# Patient Record
Sex: FEMALE | Race: WHITE | NOT HISPANIC OR LATINO | Employment: FULL TIME | URBAN - METROPOLITAN AREA
[De-identification: names, ages, dates, MRNs, and addresses within clinical notes are randomized per-mention and may not be internally consistent; named-entity substitution may affect disease eponyms.]

---

## 2017-07-27 ENCOUNTER — COMMUNICATION - HEALTHEAST (OUTPATIENT)
Dept: INTERNAL MEDICINE | Facility: CLINIC | Age: 42
End: 2017-07-27

## 2017-07-27 DIAGNOSIS — I10 ESSENTIAL HYPERTENSION: ICD-10-CM

## 2017-10-19 ENCOUNTER — OFFICE VISIT - HEALTHEAST (OUTPATIENT)
Dept: INTERNAL MEDICINE | Facility: CLINIC | Age: 42
End: 2017-10-19

## 2017-10-19 DIAGNOSIS — I10 ESSENTIAL HYPERTENSION: ICD-10-CM

## 2017-10-19 ASSESSMENT — MIFFLIN-ST. JEOR: SCORE: 1211.27

## 2017-10-23 ENCOUNTER — COMMUNICATION - HEALTHEAST (OUTPATIENT)
Dept: INTERNAL MEDICINE | Facility: CLINIC | Age: 42
End: 2017-10-23

## 2018-05-21 ENCOUNTER — COMMUNICATION - HEALTHEAST (OUTPATIENT)
Dept: SCHEDULING | Facility: CLINIC | Age: 43
End: 2018-05-21

## 2018-05-22 ENCOUNTER — OFFICE VISIT - HEALTHEAST (OUTPATIENT)
Dept: INTERNAL MEDICINE | Facility: CLINIC | Age: 43
End: 2018-05-22

## 2018-05-22 ENCOUNTER — COMMUNICATION - HEALTHEAST (OUTPATIENT)
Dept: TELEHEALTH | Facility: CLINIC | Age: 43
End: 2018-05-22

## 2018-05-22 DIAGNOSIS — R07.9 CHEST PAIN: ICD-10-CM

## 2018-05-22 DIAGNOSIS — R00.2 HEART PALPITATIONS: ICD-10-CM

## 2018-05-22 LAB
ATRIAL RATE - MUSE: 81 BPM
DIASTOLIC BLOOD PRESSURE - MUSE: NORMAL MMHG
INTERPRETATION ECG - MUSE: NORMAL
P AXIS - MUSE: 55 DEGREES
PR INTERVAL - MUSE: 136 MS
QRS DURATION - MUSE: 86 MS
QT - MUSE: 374 MS
QTC - MUSE: 434 MS
R AXIS - MUSE: 34 DEGREES
SYSTOLIC BLOOD PRESSURE - MUSE: NORMAL MMHG
T AXIS - MUSE: 23 DEGREES
VENTRICULAR RATE- MUSE: 81 BPM

## 2018-08-10 ENCOUNTER — COMMUNICATION - HEALTHEAST (OUTPATIENT)
Dept: INTERNAL MEDICINE | Facility: CLINIC | Age: 43
End: 2018-08-10

## 2018-08-10 DIAGNOSIS — I10 ESSENTIAL HYPERTENSION: ICD-10-CM

## 2018-11-01 ENCOUNTER — COMMUNICATION - HEALTHEAST (OUTPATIENT)
Dept: INTERNAL MEDICINE | Facility: CLINIC | Age: 43
End: 2018-11-01

## 2018-11-01 ENCOUNTER — COMMUNICATION - HEALTHEAST (OUTPATIENT)
Dept: TELEHEALTH | Facility: CLINIC | Age: 43
End: 2018-11-01

## 2018-11-01 ENCOUNTER — OFFICE VISIT - HEALTHEAST (OUTPATIENT)
Dept: INTERNAL MEDICINE | Facility: CLINIC | Age: 43
End: 2018-11-01

## 2018-11-01 DIAGNOSIS — I10 ESSENTIAL HYPERTENSION: ICD-10-CM

## 2018-11-01 LAB
ANION GAP SERPL CALCULATED.3IONS-SCNC: 11 MMOL/L (ref 5–18)
BUN SERPL-MCNC: 16 MG/DL (ref 8–22)
CALCIUM SERPL-MCNC: 10.2 MG/DL (ref 8.5–10.5)
CHLORIDE BLD-SCNC: 102 MMOL/L (ref 98–107)
CO2 SERPL-SCNC: 28 MMOL/L (ref 22–31)
CREAT SERPL-MCNC: 0.75 MG/DL (ref 0.6–1.1)
GFR SERPL CREATININE-BSD FRML MDRD: >60 ML/MIN/1.73M2
GLUCOSE BLD-MCNC: 99 MG/DL (ref 70–125)
POTASSIUM BLD-SCNC: 3.8 MMOL/L (ref 3.5–5)
SODIUM SERPL-SCNC: 141 MMOL/L (ref 136–145)

## 2018-11-01 ASSESSMENT — MIFFLIN-ST. JEOR: SCORE: 1208.01

## 2018-11-06 ENCOUNTER — COMMUNICATION - HEALTHEAST (OUTPATIENT)
Dept: INTERNAL MEDICINE | Facility: CLINIC | Age: 43
End: 2018-11-06

## 2018-11-06 DIAGNOSIS — I10 ESSENTIAL HYPERTENSION: ICD-10-CM

## 2020-01-17 ENCOUNTER — COMMUNICATION - HEALTHEAST (OUTPATIENT)
Dept: INTERNAL MEDICINE | Facility: CLINIC | Age: 45
End: 2020-01-17

## 2020-01-17 DIAGNOSIS — I10 ESSENTIAL HYPERTENSION: ICD-10-CM

## 2020-01-23 ENCOUNTER — OFFICE VISIT - HEALTHEAST (OUTPATIENT)
Dept: INTERNAL MEDICINE | Facility: CLINIC | Age: 45
End: 2020-01-23

## 2020-01-23 DIAGNOSIS — R79.89 LOW TSH LEVEL: ICD-10-CM

## 2020-01-23 DIAGNOSIS — D17.30 LIPOMA OF SKIN AND SUBCUTANEOUS TISSUE: ICD-10-CM

## 2020-01-23 DIAGNOSIS — I10 ESSENTIAL HYPERTENSION: ICD-10-CM

## 2020-01-23 DIAGNOSIS — Z76.89 ENCOUNTER TO ESTABLISH CARE: ICD-10-CM

## 2020-01-23 LAB
ANION GAP SERPL CALCULATED.3IONS-SCNC: 9 MMOL/L (ref 5–18)
BASOPHILS # BLD AUTO: 0 THOU/UL (ref 0–0.2)
BASOPHILS NFR BLD AUTO: 0 % (ref 0–2)
BUN SERPL-MCNC: 14 MG/DL (ref 8–22)
CALCIUM SERPL-MCNC: 10 MG/DL (ref 8.5–10.5)
CHLORIDE BLD-SCNC: 101 MMOL/L (ref 98–107)
CHOLEST SERPL-MCNC: 189 MG/DL
CO2 SERPL-SCNC: 28 MMOL/L (ref 22–31)
CREAT SERPL-MCNC: 0.69 MG/DL (ref 0.6–1.1)
EOSINOPHIL # BLD AUTO: 0.1 THOU/UL (ref 0–0.4)
EOSINOPHIL NFR BLD AUTO: 2 % (ref 0–6)
ERYTHROCYTE [DISTWIDTH] IN BLOOD BY AUTOMATED COUNT: 13.1 % (ref 11–14.5)
FASTING STATUS PATIENT QL REPORTED: ABNORMAL
GFR SERPL CREATININE-BSD FRML MDRD: >60 ML/MIN/1.73M2
GLUCOSE BLD-MCNC: 84 MG/DL (ref 70–125)
HCT VFR BLD AUTO: 42.3 % (ref 35–47)
HDLC SERPL-MCNC: 35 MG/DL
HGB BLD-MCNC: 13.9 G/DL (ref 12–16)
LDLC SERPL CALC-MCNC: 85 MG/DL
LYMPHOCYTES # BLD AUTO: 1.8 THOU/UL (ref 0.8–4.4)
LYMPHOCYTES NFR BLD AUTO: 26 % (ref 20–40)
MCH RBC QN AUTO: 27.8 PG (ref 27–34)
MCHC RBC AUTO-ENTMCNC: 32.9 G/DL (ref 32–36)
MCV RBC AUTO: 85 FL (ref 80–100)
MONOCYTES # BLD AUTO: 0.6 THOU/UL (ref 0–0.9)
MONOCYTES NFR BLD AUTO: 9 % (ref 2–10)
NEUTROPHILS # BLD AUTO: 4.3 THOU/UL (ref 2–7.7)
NEUTROPHILS NFR BLD AUTO: 63 % (ref 50–70)
PLATELET # BLD AUTO: 284 THOU/UL (ref 140–440)
PMV BLD AUTO: 10.8 FL (ref 8.5–12.5)
POTASSIUM BLD-SCNC: 4.2 MMOL/L (ref 3.5–5)
RBC # BLD AUTO: 5 MILL/UL (ref 3.8–5.4)
SODIUM SERPL-SCNC: 138 MMOL/L (ref 136–145)
TRIGL SERPL-MCNC: 347 MG/DL
TSH SERPL DL<=0.005 MIU/L-ACNC: <0.01 UIU/ML (ref 0.3–5)
WBC: 6.8 THOU/UL (ref 4–11)

## 2020-01-23 ASSESSMENT — ANXIETY QUESTIONNAIRES
1. FEELING NERVOUS, ANXIOUS, OR ON EDGE: NOT AT ALL
3. WORRYING TOO MUCH ABOUT DIFFERENT THINGS: NOT AT ALL
6. BECOMING EASILY ANNOYED OR IRRITABLE: NOT AT ALL
4. TROUBLE RELAXING: NOT AT ALL
5. BEING SO RESTLESS THAT IT IS HARD TO SIT STILL: NOT AT ALL
7. FEELING AFRAID AS IF SOMETHING AWFUL MIGHT HAPPEN: NOT AT ALL
2. NOT BEING ABLE TO STOP OR CONTROL WORRYING: NOT AT ALL
GAD7 TOTAL SCORE: 0
IF YOU CHECKED OFF ANY PROBLEMS ON THIS QUESTIONNAIRE, HOW DIFFICULT HAVE THESE PROBLEMS MADE IT FOR YOU TO DO YOUR WORK, TAKE CARE OF THINGS AT HOME, OR GET ALONG WITH OTHER PEOPLE: NOT DIFFICULT AT ALL

## 2020-01-23 ASSESSMENT — MIFFLIN-ST. JEOR: SCORE: 1088.8

## 2020-01-23 ASSESSMENT — PATIENT HEALTH QUESTIONNAIRE - PHQ9: SUM OF ALL RESPONSES TO PHQ QUESTIONS 1-9: 1

## 2020-01-24 ENCOUNTER — COMMUNICATION - HEALTHEAST (OUTPATIENT)
Dept: INTERNAL MEDICINE | Facility: CLINIC | Age: 45
End: 2020-01-24

## 2020-01-24 ENCOUNTER — AMBULATORY - HEALTHEAST (OUTPATIENT)
Dept: INTERNAL MEDICINE | Facility: CLINIC | Age: 45
End: 2020-01-24

## 2020-01-24 DIAGNOSIS — R79.89 LOW TSH LEVEL: ICD-10-CM

## 2020-01-24 LAB
T3 SERPL-MCNC: 100 NG/DL (ref 45–175)
T4 FREE SERPL-MCNC: 1.2 NG/DL (ref 0.7–1.8)
T4 TOTAL - HISTORICAL: 9.2 UG/DL (ref 4.5–13)

## 2020-02-11 ENCOUNTER — COMMUNICATION - HEALTHEAST (OUTPATIENT)
Dept: INTERNAL MEDICINE | Facility: CLINIC | Age: 45
End: 2020-02-11

## 2020-02-11 DIAGNOSIS — I10 ESSENTIAL HYPERTENSION: ICD-10-CM

## 2020-08-31 ENCOUNTER — RECORDS - HEALTHEAST (OUTPATIENT)
Dept: ADMINISTRATIVE | Facility: OTHER | Age: 45
End: 2020-08-31

## 2020-09-03 ENCOUNTER — RECORDS - HEALTHEAST (OUTPATIENT)
Dept: ADMINISTRATIVE | Facility: OTHER | Age: 45
End: 2020-09-03

## 2020-09-09 ENCOUNTER — RECORDS - HEALTHEAST (OUTPATIENT)
Dept: ADMINISTRATIVE | Facility: OTHER | Age: 45
End: 2020-09-09

## 2020-09-16 ENCOUNTER — AMBULATORY - HEALTHEAST (OUTPATIENT)
Dept: SURGERY | Facility: CLINIC | Age: 45
End: 2020-09-16

## 2020-09-16 ENCOUNTER — HOSPITAL ENCOUNTER (OUTPATIENT)
Dept: MAMMOGRAPHY | Facility: CLINIC | Age: 45
Discharge: HOME | End: 2020-09-16
Attending: OBSTETRICS & GYNECOLOGY

## 2020-09-16 DIAGNOSIS — N63.10 LUMP OF RIGHT BREAST: ICD-10-CM

## 2020-09-22 ENCOUNTER — COMMUNICATION - HEALTHEAST (OUTPATIENT)
Dept: INTERNAL MEDICINE | Facility: CLINIC | Age: 45
End: 2020-09-22

## 2020-09-24 ENCOUNTER — COMMUNICATION - HEALTHEAST (OUTPATIENT)
Dept: INTERNAL MEDICINE | Facility: CLINIC | Age: 45
End: 2020-09-24

## 2020-09-25 ENCOUNTER — COMMUNICATION - HEALTHEAST (OUTPATIENT)
Dept: SCHEDULING | Facility: CLINIC | Age: 45
End: 2020-09-25

## 2020-09-28 ENCOUNTER — OFFICE VISIT - HEALTHEAST (OUTPATIENT)
Dept: INTERNAL MEDICINE | Facility: CLINIC | Age: 45
End: 2020-09-28

## 2020-09-28 DIAGNOSIS — I10 ESSENTIAL HYPERTENSION: ICD-10-CM

## 2020-09-29 ENCOUNTER — HOSPITAL ENCOUNTER (OUTPATIENT)
Dept: SURGERY | Facility: CLINIC | Age: 45
Discharge: HOME | End: 2020-09-29
Attending: SPECIALIST | Admitting: SPECIALIST

## 2020-09-29 DIAGNOSIS — N63.0 BREAST MASS: ICD-10-CM

## 2020-09-29 ASSESSMENT — MIFFLIN-ST. JEOR: SCORE: 1206.74

## 2020-10-02 ENCOUNTER — COMMUNICATION - HEALTHEAST (OUTPATIENT)
Dept: ONCOLOGY | Facility: CLINIC | Age: 45
End: 2020-10-02

## 2020-10-02 ENCOUNTER — AMBULATORY - HEALTHEAST (OUTPATIENT)
Dept: SURGERY | Facility: CLINIC | Age: 45
End: 2020-10-02

## 2020-10-02 DIAGNOSIS — C50.311 MALIGNANT NEOPLASM OF LOWER-INNER QUADRANT OF RIGHT FEMALE BREAST, UNSPECIFIED ESTROGEN RECEPTOR STATUS (H): ICD-10-CM

## 2020-10-02 LAB
CAP COMMENT: ABNORMAL
LAB AP CHARGES (HE HISTORICAL CONVERSION): ABNORMAL
LAB MED GENERAL PATH INTERP (HE HISTORICAL CONVERSION): ABNORMAL
PATH REPORT.COMMENTS IMP SPEC: ABNORMAL
PATH REPORT.COMMENTS IMP SPEC: ABNORMAL
PATH REPORT.FINAL DX SPEC: ABNORMAL
PATH REPORT.MICROSCOPIC SPEC OTHER STN: ABNORMAL
PATH REPORT.MICROSCOPIC SPEC OTHER STN: ABNORMAL
PATH REPORT.RELEVANT HX SPEC: ABNORMAL
SPECIMEN DESCRIPTION: ABNORMAL

## 2020-10-05 ENCOUNTER — AMBULATORY - HEALTHEAST (OUTPATIENT)
Dept: SURGERY | Facility: CLINIC | Age: 45
End: 2020-10-05

## 2020-10-05 ENCOUNTER — OFFICE VISIT - HEALTHEAST (OUTPATIENT)
Dept: ONCOLOGY | Facility: CLINIC | Age: 45
End: 2020-10-05

## 2020-10-05 DIAGNOSIS — D70.1 CHEMOTHERAPY-INDUCED NEUTROPENIA (H): ICD-10-CM

## 2020-10-05 DIAGNOSIS — T45.1X5A CHEMOTHERAPY-INDUCED NEUTROPENIA (H): ICD-10-CM

## 2020-10-05 DIAGNOSIS — C50.311 MALIGNANT NEOPLASM OF LOWER-INNER QUADRANT OF RIGHT FEMALE BREAST, UNSPECIFIED ESTROGEN RECEPTOR STATUS (H): ICD-10-CM

## 2020-10-05 DIAGNOSIS — C50.311 MALIGNANT NEOPLASM OF LOWER-INNER QUADRANT OF RIGHT BREAST OF FEMALE, ESTROGEN RECEPTOR NEGATIVE (H): ICD-10-CM

## 2020-10-05 DIAGNOSIS — Z17.1 MALIGNANT NEOPLASM OF LOWER-INNER QUADRANT OF RIGHT BREAST OF FEMALE, ESTROGEN RECEPTOR NEGATIVE (H): ICD-10-CM

## 2020-10-05 ASSESSMENT — MIFFLIN-ST. JEOR: SCORE: 1189.73

## 2020-10-06 ENCOUNTER — COMMUNICATION - HEALTHEAST (OUTPATIENT)
Dept: ONCOLOGY | Facility: HOSPITAL | Age: 45
End: 2020-10-06

## 2020-10-06 ENCOUNTER — HOSPITAL ENCOUNTER (OUTPATIENT)
Dept: ULTRASOUND IMAGING | Facility: CLINIC | Age: 45
Discharge: HOME | End: 2020-10-06
Attending: INTERNAL MEDICINE

## 2020-10-06 ENCOUNTER — AMBULATORY - HEALTHEAST (OUTPATIENT)
Dept: SURGERY | Facility: CLINIC | Age: 45
End: 2020-10-06

## 2020-10-06 ENCOUNTER — AMBULATORY - HEALTHEAST (OUTPATIENT)
Dept: LAB | Facility: CLINIC | Age: 45
End: 2020-10-06

## 2020-10-06 ENCOUNTER — HOSPITAL ENCOUNTER (OUTPATIENT)
Dept: CT IMAGING | Facility: CLINIC | Age: 45
Discharge: HOME | End: 2020-10-06
Attending: INTERNAL MEDICINE

## 2020-10-06 ENCOUNTER — AMBULATORY - HEALTHEAST (OUTPATIENT)
Dept: SURGERY | Facility: AMBULATORY SURGERY CENTER | Age: 45
End: 2020-10-06

## 2020-10-06 ENCOUNTER — COMMUNICATION - HEALTHEAST (OUTPATIENT)
Dept: ONCOLOGY | Facility: CLINIC | Age: 45
End: 2020-10-06

## 2020-10-06 ENCOUNTER — COMMUNICATION - HEALTHEAST (OUTPATIENT)
Dept: OTOLARYNGOLOGY | Facility: CLINIC | Age: 45
End: 2020-10-06

## 2020-10-06 ENCOUNTER — HOSPITAL ENCOUNTER (OUTPATIENT)
Dept: CARDIOLOGY | Facility: CLINIC | Age: 45
Discharge: HOME | End: 2020-10-06
Attending: INTERNAL MEDICINE

## 2020-10-06 ENCOUNTER — COMMUNICATION - HEALTHEAST (OUTPATIENT)
Dept: ADMINISTRATIVE | Facility: HOSPITAL | Age: 45
End: 2020-10-06

## 2020-10-06 DIAGNOSIS — C50.311 MALIGNANT NEOPLASM OF LOWER-INNER QUADRANT OF RIGHT FEMALE BREAST, UNSPECIFIED ESTROGEN RECEPTOR STATUS (H): ICD-10-CM

## 2020-10-06 DIAGNOSIS — Z11.59 ENCOUNTER FOR SCREENING FOR OTHER VIRAL DISEASES: ICD-10-CM

## 2020-10-06 LAB
AORTIC ROOT: 3 CM
AORTIC VALVE MEAN VELOCITY: 93.7 CM/S
ASCENDING AORTA: 2.5 CM
AV DIMENSIONLESS INDEX VTI: 0.9
AV MEAN GRADIENT: 4 MMHG
AV PEAK GRADIENT: 6.5 MMHG
AV VALVE AREA: 2.4 CM2
AV VELOCITY RATIO: 0.9
BSA FOR ECHO PROCEDURE: 1.6 M2
CV BLOOD PRESSURE: NORMAL MMHG
CV ECHO HEIGHT: 63 IN
CV ECHO WEIGHT: 128 LBS
DOP CALC AO PEAK VEL: 127 CM/S
DOP CALC AO VTI: 25 CM
DOP CALC LVOT AREA: 2.83 CM2
DOP CALC LVOT DIAMETER: 1.9 CM
DOP CALC LVOT PEAK VEL: 110 CM/S
DOP CALC LVOT STROKE VOLUME: 61.2 CM3
DOP CALCLVOT PEAK VEL VTI: 21.6 CM
EJECTION FRACTION: 57 % (ref 55–75)
FRACTIONAL SHORTENING: 37.8 % (ref 28–44)
INTERVENTRICULAR SEPTUM IN END DIASTOLE: 0.78 CM (ref 0.6–0.9)
IVS/PW RATIO: 1.1
LA AREA 1: 12.8 CM2
LA AREA 2: 12.8 CM2
LEFT ATRIUM LENGTH: 4.25 CM
LEFT ATRIUM SIZE: 2.9 CM
LEFT ATRIUM VOLUME INDEX: 20.5 ML/M2
LEFT ATRIUM VOLUME: 32.8 ML
LEFT VENTRICLE DIASTOLIC VOLUME INDEX: 41.9 CM3/M2 (ref 29–61)
LEFT VENTRICLE DIASTOLIC VOLUME: 67 CM3 (ref 46–106)
LEFT VENTRICLE MASS INDEX: 66.6 G/M2
LEFT VENTRICLE SYSTOLIC VOLUME INDEX: 18.1 CM3/M2 (ref 8–24)
LEFT VENTRICLE SYSTOLIC VOLUME: 29 CM3 (ref 14–42)
LEFT VENTRICULAR INTERNAL DIMENSION IN DIASTOLE: 4.52 CM (ref 3.8–5.2)
LEFT VENTRICULAR INTERNAL DIMENSION IN SYSTOLE: 2.81 CM (ref 2.2–3.5)
LEFT VENTRICULAR MASS: 106.6 G
LEFT VENTRICULAR OUTFLOW TRACT MEAN GRADIENT: 3 MMHG
LEFT VENTRICULAR OUTFLOW TRACT MEAN VELOCITY: 85.5 CM/S
LEFT VENTRICULAR OUTFLOW TRACT PEAK GRADIENT: 5 MMHG
LEFT VENTRICULAR POSTERIOR WALL IN END DIASTOLE: 0.73 CM (ref 0.6–0.9)
LV STROKE VOLUME INDEX: 38.3 ML/M2
MITRAL VALVE E/A RATIO: 1.2
MV AVERAGE E/E' RATIO: 7.5 CM/S
MV DECELERATION TIME: 162 MS
MV E'TISSUE VEL-LAT: 10 CM/S
MV E'TISSUE VEL-MED: 9.16 CM/S
MV LATERAL E/E' RATIO: 7.2
MV MEDIAL E/E' RATIO: 7.8
MV PEAK A VELOCITY: 60.7 CM/S
MV PEAK E VELOCITY: 71.6 CM/S
NUC REST DIASTOLIC VOLUME INDEX: 2048 LBS
NUC REST SYSTOLIC VOLUME INDEX: 63 IN
TRICUSPID VALVE ANULAR PLANE SYSTOLIC EXCURSION: 1.9 CM

## 2020-10-06 ASSESSMENT — MIFFLIN-ST. JEOR: SCORE: 1189.73

## 2020-10-07 ENCOUNTER — COMMUNICATION - HEALTHEAST (OUTPATIENT)
Dept: SCHEDULING | Facility: CLINIC | Age: 45
End: 2020-10-07

## 2020-10-07 ENCOUNTER — COMMUNICATION - HEALTHEAST (OUTPATIENT)
Dept: ONCOLOGY | Facility: HOSPITAL | Age: 45
End: 2020-10-07

## 2020-10-07 ENCOUNTER — ANESTHESIA - HEALTHEAST (OUTPATIENT)
Dept: SURGERY | Facility: AMBULATORY SURGERY CENTER | Age: 45
End: 2020-10-07

## 2020-10-07 DIAGNOSIS — C50.311 MALIGNANT NEOPLASM OF LOWER-INNER QUADRANT OF RIGHT FEMALE BREAST, UNSPECIFIED ESTROGEN RECEPTOR STATUS (H): ICD-10-CM

## 2020-10-07 ASSESSMENT — MIFFLIN-ST. JEOR
SCORE: 1197.66
SCORE: 1197.66

## 2020-10-08 ENCOUNTER — COMMUNICATION - HEALTHEAST (OUTPATIENT)
Dept: SURGERY | Facility: CLINIC | Age: 45
End: 2020-10-08

## 2020-10-08 ENCOUNTER — HOSPITAL ENCOUNTER (OUTPATIENT)
Dept: MAMMOGRAPHY | Facility: CLINIC | Age: 45
Discharge: HOME | End: 2020-10-08
Attending: SPECIALIST

## 2020-10-08 ENCOUNTER — SURGERY - HEALTHEAST (OUTPATIENT)
Dept: SURGERY | Facility: AMBULATORY SURGERY CENTER | Age: 45
End: 2020-10-08

## 2020-10-08 ENCOUNTER — HOSPITAL ENCOUNTER (OUTPATIENT)
Dept: SURGERY | Facility: AMBULATORY SURGERY CENTER | Age: 45
Discharge: HOME | End: 2020-10-08
Attending: SPECIALIST | Admitting: SPECIALIST
Payer: COMMERCIAL

## 2020-10-08 ENCOUNTER — AMBULATORY - HEALTHEAST (OUTPATIENT)
Dept: SURGERY | Facility: CLINIC | Age: 45
End: 2020-10-08

## 2020-10-08 DIAGNOSIS — C50.311 MALIGNANT NEOPLASM OF LOWER-INNER QUADRANT OF RIGHT BREAST OF FEMALE, ESTROGEN RECEPTOR NEGATIVE (H): ICD-10-CM

## 2020-10-08 DIAGNOSIS — Z17.1 MALIGNANT NEOPLASM OF LOWER-INNER QUADRANT OF RIGHT BREAST OF FEMALE, ESTROGEN RECEPTOR NEGATIVE (H): ICD-10-CM

## 2020-10-08 LAB
DIPSTICK EXPIRATION DATE - HISTORICAL: NORMAL
DIPSTICK LOT NUMBER - HISTORICAL: NORMAL
POC PREG URINE (HCG) HE - HISTORICAL: NEGATIVE
POC SPECIFIC GRAVITY, URINE - HISTORICAL: NORMAL
POCT KIT EXPIRATION DATE - HISTORICAL: NORMAL
POCT KIT LOT NUMBER HE - HISTORICAL: NORMAL
POCT NEGATIVE CONTROL HE - HISTORICAL: NORMAL
POCT POSITIVE CONTROL HE - HISTORICAL: NORMAL

## 2020-10-09 ENCOUNTER — AMBULATORY - HEALTHEAST (OUTPATIENT)
Dept: INFUSION THERAPY | Facility: HOSPITAL | Age: 45
End: 2020-10-09

## 2020-10-09 ENCOUNTER — COMMUNICATION - HEALTHEAST (OUTPATIENT)
Dept: ADMINISTRATIVE | Facility: HOSPITAL | Age: 45
End: 2020-10-09

## 2020-10-09 ENCOUNTER — HOSPITAL ENCOUNTER (OUTPATIENT)
Dept: PET IMAGING | Facility: HOSPITAL | Age: 45
Discharge: HOME | End: 2020-10-09
Attending: SPECIALIST

## 2020-10-09 ENCOUNTER — OFFICE VISIT - HEALTHEAST (OUTPATIENT)
Dept: ONCOLOGY | Facility: HOSPITAL | Age: 45
End: 2020-10-09

## 2020-10-09 ENCOUNTER — INFUSION - HEALTHEAST (OUTPATIENT)
Dept: INFUSION THERAPY | Facility: HOSPITAL | Age: 45
End: 2020-10-09

## 2020-10-09 DIAGNOSIS — C50.311 MALIGNANT NEOPLASM OF LOWER-INNER QUADRANT OF RIGHT FEMALE BREAST, UNSPECIFIED ESTROGEN RECEPTOR STATUS (H): ICD-10-CM

## 2020-10-09 DIAGNOSIS — D70.1 CHEMOTHERAPY-INDUCED NEUTROPENIA (H): ICD-10-CM

## 2020-10-09 DIAGNOSIS — T45.1X5A CHEMOTHERAPY-INDUCED NEUTROPENIA (H): ICD-10-CM

## 2020-10-09 DIAGNOSIS — Z17.1 MALIGNANT NEOPLASM OF LOWER-INNER QUADRANT OF RIGHT BREAST OF FEMALE, ESTROGEN RECEPTOR NEGATIVE (H): ICD-10-CM

## 2020-10-09 DIAGNOSIS — C50.311 MALIGNANT NEOPLASM OF LOWER-INNER QUADRANT OF RIGHT BREAST OF FEMALE, ESTROGEN RECEPTOR NEGATIVE (H): ICD-10-CM

## 2020-10-09 LAB
ALBUMIN SERPL-MCNC: 4.1 G/DL (ref 3.5–5)
ALP SERPL-CCNC: 48 U/L (ref 45–120)
ALT SERPL W P-5'-P-CCNC: 12 U/L (ref 0–45)
ANION GAP SERPL CALCULATED.3IONS-SCNC: 8 MMOL/L (ref 5–18)
AST SERPL W P-5'-P-CCNC: 14 U/L (ref 0–40)
BASOPHILS # BLD AUTO: 0 THOU/UL (ref 0–0.2)
BASOPHILS NFR BLD AUTO: 0 % (ref 0–2)
BILIRUB SERPL-MCNC: 0.4 MG/DL (ref 0–1)
BUN SERPL-MCNC: 16 MG/DL (ref 8–22)
CALCIUM SERPL-MCNC: 9.2 MG/DL (ref 8.5–10.5)
CHLORIDE BLD-SCNC: 104 MMOL/L (ref 98–107)
CO2 SERPL-SCNC: 26 MMOL/L (ref 22–31)
CREAT SERPL-MCNC: 0.78 MG/DL (ref 0.6–1.1)
EOSINOPHIL # BLD AUTO: 0 THOU/UL (ref 0–0.4)
EOSINOPHIL NFR BLD AUTO: 0 % (ref 0–6)
ERYTHROCYTE [DISTWIDTH] IN BLOOD BY AUTOMATED COUNT: 16 % (ref 11–14.5)
GFR SERPL CREATININE-BSD FRML MDRD: >60 ML/MIN/1.73M2
GLUCOSE BLD-MCNC: 93 MG/DL (ref 70–125)
GLUCOSE BLDC GLUCOMTR-MCNC: 80 MG/DL (ref 70–139)
HCT VFR BLD AUTO: 34.3 % (ref 35–47)
HGB BLD-MCNC: 10.8 G/DL (ref 12–16)
IMM GRANULOCYTES # BLD: 0 THOU/UL
IMM GRANULOCYTES NFR BLD: 0 %
LYMPHOCYTES # BLD AUTO: 1.5 THOU/UL (ref 0.8–4.4)
LYMPHOCYTES NFR BLD AUTO: 15 % (ref 20–40)
MCH RBC QN AUTO: 24.3 PG (ref 27–34)
MCHC RBC AUTO-ENTMCNC: 31.5 G/DL (ref 32–36)
MCV RBC AUTO: 77 FL (ref 80–100)
MONOCYTES # BLD AUTO: 0.6 THOU/UL (ref 0–0.9)
MONOCYTES NFR BLD AUTO: 6 % (ref 2–10)
NEUTROPHILS # BLD AUTO: 7.5 THOU/UL (ref 2–7.7)
NEUTROPHILS NFR BLD AUTO: 78 % (ref 50–70)
PLATELET # BLD AUTO: 372 THOU/UL (ref 140–440)
PMV BLD AUTO: 9.7 FL (ref 8.5–12.5)
POTASSIUM BLD-SCNC: 3.7 MMOL/L (ref 3.5–5)
PROT SERPL-MCNC: 7.5 G/DL (ref 6–8)
RBC # BLD AUTO: 4.44 MILL/UL (ref 3.8–5.4)
SODIUM SERPL-SCNC: 138 MMOL/L (ref 136–145)
WBC: 9.6 THOU/UL (ref 4–11)

## 2020-10-09 ASSESSMENT — MIFFLIN-ST. JEOR: SCORE: 1177.03

## 2020-10-12 ENCOUNTER — COMMUNICATION - HEALTHEAST (OUTPATIENT)
Dept: INFUSION THERAPY | Facility: HOSPITAL | Age: 45
End: 2020-10-12

## 2020-10-13 ENCOUNTER — AMBULATORY - HEALTHEAST (OUTPATIENT)
Dept: ONCOLOGY | Facility: HOSPITAL | Age: 45
End: 2020-10-13

## 2020-10-13 ENCOUNTER — COMMUNICATION - HEALTHEAST (OUTPATIENT)
Dept: ONCOLOGY | Facility: HOSPITAL | Age: 45
End: 2020-10-13

## 2020-10-13 DIAGNOSIS — D70.1 CHEMOTHERAPY-INDUCED NEUTROPENIA (H): ICD-10-CM

## 2020-10-13 DIAGNOSIS — T45.1X5A CHEMOTHERAPY-INDUCED NEUTROPENIA (H): ICD-10-CM

## 2020-10-13 DIAGNOSIS — C50.311 MALIGNANT NEOPLASM OF LOWER-INNER QUADRANT OF RIGHT FEMALE BREAST, UNSPECIFIED ESTROGEN RECEPTOR STATUS (H): ICD-10-CM

## 2020-10-15 ENCOUNTER — COMMUNICATION - HEALTHEAST (OUTPATIENT)
Dept: ONCOLOGY | Facility: HOSPITAL | Age: 45
End: 2020-10-15

## 2020-10-16 ENCOUNTER — COMMUNICATION - HEALTHEAST (OUTPATIENT)
Dept: ONCOLOGY | Facility: HOSPITAL | Age: 45
End: 2020-10-16

## 2020-10-21 ENCOUNTER — COMMUNICATION - HEALTHEAST (OUTPATIENT)
Dept: ADMINISTRATIVE | Facility: HOSPITAL | Age: 45
End: 2020-10-21

## 2020-10-22 ENCOUNTER — INFUSION - HEALTHEAST (OUTPATIENT)
Dept: INFUSION THERAPY | Facility: HOSPITAL | Age: 45
End: 2020-10-22

## 2020-10-22 ENCOUNTER — OFFICE VISIT - HEALTHEAST (OUTPATIENT)
Dept: ONCOLOGY | Facility: HOSPITAL | Age: 45
End: 2020-10-22

## 2020-10-22 ENCOUNTER — AMBULATORY - HEALTHEAST (OUTPATIENT)
Dept: ONCOLOGY | Facility: HOSPITAL | Age: 45
End: 2020-10-22

## 2020-10-22 ENCOUNTER — AMBULATORY - HEALTHEAST (OUTPATIENT)
Dept: INFUSION THERAPY | Facility: HOSPITAL | Age: 45
End: 2020-10-22

## 2020-10-22 DIAGNOSIS — C50.311 MALIGNANT NEOPLASM OF LOWER-INNER QUADRANT OF RIGHT FEMALE BREAST, UNSPECIFIED ESTROGEN RECEPTOR STATUS (H): ICD-10-CM

## 2020-10-22 DIAGNOSIS — T45.1X5A CHEMOTHERAPY-INDUCED NEUTROPENIA (H): ICD-10-CM

## 2020-10-22 DIAGNOSIS — D70.1 CHEMOTHERAPY-INDUCED NEUTROPENIA (H): ICD-10-CM

## 2020-10-22 LAB
ALBUMIN SERPL-MCNC: 4 G/DL (ref 3.5–5)
ALP SERPL-CCNC: 71 U/L (ref 45–120)
ALT SERPL W P-5'-P-CCNC: 18 U/L (ref 0–45)
ANION GAP SERPL CALCULATED.3IONS-SCNC: 10 MMOL/L (ref 5–18)
AST SERPL W P-5'-P-CCNC: 14 U/L (ref 0–40)
BASOPHILS # BLD AUTO: 0 THOU/UL (ref 0–0.2)
BASOPHILS NFR BLD AUTO: 0 % (ref 0–2)
BILIRUB SERPL-MCNC: 0.2 MG/DL (ref 0–1)
BUN SERPL-MCNC: 13 MG/DL (ref 8–22)
CALCIUM SERPL-MCNC: 8.8 MG/DL (ref 8.5–10.5)
CHLORIDE BLD-SCNC: 104 MMOL/L (ref 98–107)
CO2 SERPL-SCNC: 27 MMOL/L (ref 22–31)
CREAT SERPL-MCNC: 0.7 MG/DL (ref 0.6–1.1)
EOSINOPHIL COUNT (ABSOLUTE): 0 THOU/UL (ref 0–0.4)
EOSINOPHIL NFR BLD AUTO: 0 % (ref 0–6)
ERYTHROCYTE [DISTWIDTH] IN BLOOD BY AUTOMATED COUNT: 17 % (ref 11–14.5)
FERRITIN SERPL-MCNC: 60 NG/ML (ref 10–130)
GFR SERPL CREATININE-BSD FRML MDRD: >60 ML/MIN/1.73M2
GLUCOSE BLD-MCNC: 120 MG/DL (ref 70–125)
HCT VFR BLD AUTO: 33.1 % (ref 35–47)
HGB BLD-MCNC: 10.3 G/DL (ref 12–16)
IMMATURE GRANULOCYTE % - MAN (DIFF): 3 %
IMMATURE GRANULOCYTE ABSOLUTE - MAN (DIFF): 0.3 THOU/UL
LYMPHOCYTES # BLD AUTO: 0.7 THOU/UL (ref 0.8–4.4)
LYMPHOCYTES NFR BLD AUTO: 9 % (ref 20–40)
MCH RBC QN AUTO: 24.6 PG (ref 27–34)
MCHC RBC AUTO-ENTMCNC: 31.1 G/DL (ref 32–36)
MCV RBC AUTO: 79 FL (ref 80–100)
METAMYELOCYTES (ABSOLUTE): 0.3 THOU/UL
METAMYELOCYTES NFR BLD MANUAL: 3 %
MONOCYTES # BLD AUTO: 0.5 THOU/UL (ref 0–0.9)
MONOCYTES NFR BLD AUTO: 6 % (ref 2–10)
PLAT MORPH BLD: NORMAL
PLATELET # BLD AUTO: 179 THOU/UL (ref 140–440)
PMV BLD AUTO: 10.3 FL (ref 8.5–12.5)
POTASSIUM BLD-SCNC: 3.7 MMOL/L (ref 3.5–5)
PROT SERPL-MCNC: 7 G/DL (ref 6–8)
RBC # BLD AUTO: 4.18 MILL/UL (ref 3.8–5.4)
SODIUM SERPL-SCNC: 141 MMOL/L (ref 136–145)
TOTAL NEUTROPHILS-ABS(DIFF): 7.2 THOU/UL (ref 2–7.7)
TOTAL NEUTROPHILS-REL(DIFF): 83 % (ref 50–70)
WBC: 8.7 THOU/UL (ref 4–11)

## 2020-10-23 ENCOUNTER — COMMUNICATION - HEALTHEAST (OUTPATIENT)
Dept: ONCOLOGY | Facility: HOSPITAL | Age: 45
End: 2020-10-23

## 2020-10-27 ENCOUNTER — COMMUNICATION - HEALTHEAST (OUTPATIENT)
Dept: ONCOLOGY | Facility: HOSPITAL | Age: 45
End: 2020-10-27

## 2020-11-04 ENCOUNTER — COMMUNICATION - HEALTHEAST (OUTPATIENT)
Dept: ADMINISTRATIVE | Facility: HOSPITAL | Age: 45
End: 2020-11-04

## 2020-11-05 ENCOUNTER — AMBULATORY - HEALTHEAST (OUTPATIENT)
Dept: INFUSION THERAPY | Facility: HOSPITAL | Age: 45
End: 2020-11-05

## 2020-11-05 ENCOUNTER — INFUSION - HEALTHEAST (OUTPATIENT)
Dept: INFUSION THERAPY | Facility: HOSPITAL | Age: 45
End: 2020-11-05

## 2020-11-05 ENCOUNTER — OFFICE VISIT - HEALTHEAST (OUTPATIENT)
Dept: ONCOLOGY | Facility: HOSPITAL | Age: 45
End: 2020-11-05

## 2020-11-05 DIAGNOSIS — D70.1 CHEMOTHERAPY-INDUCED NEUTROPENIA (H): ICD-10-CM

## 2020-11-05 DIAGNOSIS — C50.311 MALIGNANT NEOPLASM OF LOWER-INNER QUADRANT OF RIGHT FEMALE BREAST, UNSPECIFIED ESTROGEN RECEPTOR STATUS (H): ICD-10-CM

## 2020-11-05 DIAGNOSIS — T45.1X5A CHEMOTHERAPY-INDUCED NEUTROPENIA (H): ICD-10-CM

## 2020-11-05 LAB
ALBUMIN SERPL-MCNC: 4.2 G/DL (ref 3.5–5)
ALP SERPL-CCNC: 81 U/L (ref 45–120)
ALT SERPL W P-5'-P-CCNC: 19 U/L (ref 0–45)
ANION GAP SERPL CALCULATED.3IONS-SCNC: 7 MMOL/L (ref 5–18)
AST SERPL W P-5'-P-CCNC: 14 U/L (ref 0–40)
BASOPHILS # BLD AUTO: 0 THOU/UL (ref 0–0.2)
BASOPHILS NFR BLD AUTO: 0 % (ref 0–2)
BILIRUB SERPL-MCNC: 0.2 MG/DL (ref 0–1)
BUN SERPL-MCNC: 14 MG/DL (ref 8–22)
CALCIUM SERPL-MCNC: 9.3 MG/DL (ref 8.5–10.5)
CHLORIDE BLD-SCNC: 105 MMOL/L (ref 98–107)
CO2 SERPL-SCNC: 28 MMOL/L (ref 22–31)
CREAT SERPL-MCNC: 0.67 MG/DL (ref 0.6–1.1)
EOSINOPHIL COUNT (ABSOLUTE): 0 THOU/UL (ref 0–0.4)
EOSINOPHIL NFR BLD AUTO: 0 % (ref 0–6)
ERYTHROCYTE [DISTWIDTH] IN BLOOD BY AUTOMATED COUNT: 19.9 % (ref 11–14.5)
GFR SERPL CREATININE-BSD FRML MDRD: >60 ML/MIN/1.73M2
GLUCOSE BLD-MCNC: 86 MG/DL (ref 70–125)
HCT VFR BLD AUTO: 34.6 % (ref 35–47)
HGB BLD-MCNC: 10.9 G/DL (ref 12–16)
IMMATURE GRANULOCYTE % - MAN (DIFF): 0 %
IMMATURE GRANULOCYTE ABSOLUTE - MAN (DIFF): 0 THOU/UL
LYMPHOCYTES # BLD AUTO: 1 THOU/UL (ref 0.8–4.4)
LYMPHOCYTES NFR BLD AUTO: 12 % (ref 20–40)
MCH RBC QN AUTO: 25.5 PG (ref 27–34)
MCHC RBC AUTO-ENTMCNC: 31.5 G/DL (ref 32–36)
MCV RBC AUTO: 81 FL (ref 80–100)
MONOCYTES # BLD AUTO: 0.8 THOU/UL (ref 0–0.9)
MONOCYTES NFR BLD AUTO: 9 % (ref 2–10)
OVALOCYTES: ABNORMAL
PLAT MORPH BLD: NORMAL
PLATELET # BLD AUTO: 234 THOU/UL (ref 140–440)
PMV BLD AUTO: 9.4 FL (ref 8.5–12.5)
POLYCHROMASIA BLD QL SMEAR: ABNORMAL
POTASSIUM BLD-SCNC: 4.2 MMOL/L (ref 3.5–5)
PROT SERPL-MCNC: 7.2 G/DL (ref 6–8)
RBC # BLD AUTO: 4.28 MILL/UL (ref 3.8–5.4)
SODIUM SERPL-SCNC: 140 MMOL/L (ref 136–145)
TOTAL NEUTROPHILS-ABS(DIFF): 6.8 THOU/UL (ref 2–7.7)
TOTAL NEUTROPHILS-REL(DIFF): 79 % (ref 50–70)
WBC: 8.6 THOU/UL (ref 4–11)

## 2020-11-05 ASSESSMENT — MIFFLIN-ST. JEOR: SCORE: 1161.16

## 2020-11-18 ENCOUNTER — COMMUNICATION - HEALTHEAST (OUTPATIENT)
Dept: ONCOLOGY | Facility: HOSPITAL | Age: 45
End: 2020-11-18

## 2020-11-18 DIAGNOSIS — T45.1X5A CHEMOTHERAPY-INDUCED NEUTROPENIA (H): ICD-10-CM

## 2020-11-18 DIAGNOSIS — D70.1 CHEMOTHERAPY-INDUCED NEUTROPENIA (H): ICD-10-CM

## 2020-11-18 DIAGNOSIS — C50.311 MALIGNANT NEOPLASM OF LOWER-INNER QUADRANT OF RIGHT FEMALE BREAST, UNSPECIFIED ESTROGEN RECEPTOR STATUS (H): ICD-10-CM

## 2020-11-19 ENCOUNTER — INFUSION - HEALTHEAST (OUTPATIENT)
Dept: INFUSION THERAPY | Facility: HOSPITAL | Age: 45
End: 2020-11-19

## 2020-11-19 ENCOUNTER — OFFICE VISIT - HEALTHEAST (OUTPATIENT)
Dept: ONCOLOGY | Facility: HOSPITAL | Age: 45
End: 2020-11-19

## 2020-11-19 ENCOUNTER — AMBULATORY - HEALTHEAST (OUTPATIENT)
Dept: INFUSION THERAPY | Facility: HOSPITAL | Age: 45
End: 2020-11-19

## 2020-11-19 DIAGNOSIS — C50.311 MALIGNANT NEOPLASM OF LOWER-INNER QUADRANT OF RIGHT FEMALE BREAST, UNSPECIFIED ESTROGEN RECEPTOR STATUS (H): ICD-10-CM

## 2020-11-19 DIAGNOSIS — T45.1X5A CHEMOTHERAPY-INDUCED NEUTROPENIA (H): ICD-10-CM

## 2020-11-19 DIAGNOSIS — D70.1 CHEMOTHERAPY-INDUCED NEUTROPENIA (H): ICD-10-CM

## 2020-11-19 LAB
ALBUMIN SERPL-MCNC: 4.2 G/DL (ref 3.5–5)
ALP SERPL-CCNC: 80 U/L (ref 45–120)
ALT SERPL W P-5'-P-CCNC: 18 U/L (ref 0–45)
ANION GAP SERPL CALCULATED.3IONS-SCNC: 10 MMOL/L (ref 5–18)
AST SERPL W P-5'-P-CCNC: 15 U/L (ref 0–40)
BASOPHILS # BLD AUTO: 0 THOU/UL (ref 0–0.2)
BASOPHILS NFR BLD AUTO: 0 % (ref 0–2)
BILIRUB SERPL-MCNC: 0.2 MG/DL (ref 0–1)
BUN SERPL-MCNC: 14 MG/DL (ref 8–22)
CALCIUM SERPL-MCNC: 9.3 MG/DL (ref 8.5–10.5)
CHLORIDE BLD-SCNC: 104 MMOL/L (ref 98–107)
CO2 SERPL-SCNC: 27 MMOL/L (ref 22–31)
CREAT SERPL-MCNC: 0.68 MG/DL (ref 0.6–1.1)
EOSINOPHIL COUNT (ABSOLUTE): 0 THOU/UL (ref 0–0.4)
EOSINOPHIL NFR BLD AUTO: 0 % (ref 0–6)
ERYTHROCYTE [DISTWIDTH] IN BLOOD BY AUTOMATED COUNT: 22.5 % (ref 11–14.5)
GFR SERPL CREATININE-BSD FRML MDRD: >60 ML/MIN/1.73M2
GLUCOSE BLD-MCNC: 91 MG/DL (ref 70–125)
HCT VFR BLD AUTO: 32.1 % (ref 35–47)
HGB BLD-MCNC: 10.5 G/DL (ref 12–16)
IMMATURE GRANULOCYTE % - MAN (DIFF): 7 %
IMMATURE GRANULOCYTE ABSOLUTE - MAN (DIFF): 0.6 THOU/UL
LYMPHOCYTES # BLD AUTO: 0.5 THOU/UL (ref 0.8–4.4)
LYMPHOCYTES NFR BLD AUTO: 5 % (ref 20–40)
MCH RBC QN AUTO: 26.9 PG (ref 27–34)
MCHC RBC AUTO-ENTMCNC: 32.7 G/DL (ref 32–36)
MCV RBC AUTO: 82 FL (ref 80–100)
METAMYELOCYTES (ABSOLUTE): 0.1 THOU/UL
METAMYELOCYTES NFR BLD MANUAL: 1 %
MONOCYTES # BLD AUTO: 0.5 THOU/UL (ref 0–0.9)
MONOCYTES NFR BLD AUTO: 6 % (ref 2–10)
MYELOCYTES (ABSOLUTE): 0.5 THOU/UL
MYELOCYTES NFR BLD MANUAL: 6 %
OVALOCYTES: ABNORMAL
PLAT MORPH BLD: NORMAL
PLATELET # BLD AUTO: 164 THOU/UL (ref 140–440)
PMV BLD AUTO: 10.1 FL (ref 8.5–12.5)
POLYCHROMASIA BLD QL SMEAR: ABNORMAL
POTASSIUM BLD-SCNC: 4.1 MMOL/L (ref 3.5–5)
PROT SERPL-MCNC: 6.8 G/DL (ref 6–8)
RBC # BLD AUTO: 3.9 MILL/UL (ref 3.8–5.4)
SODIUM SERPL-SCNC: 141 MMOL/L (ref 136–145)
TEAR DROP: ABNORMAL
TOTAL NEUTROPHILS-ABS(DIFF): 7.4 THOU/UL (ref 2–7.7)
TOTAL NEUTROPHILS-REL(DIFF): 82 % (ref 50–70)
WBC: 9 THOU/UL (ref 4–11)

## 2020-11-19 ASSESSMENT — MIFFLIN-ST. JEOR: SCORE: 1172.95

## 2020-11-23 ENCOUNTER — COMMUNICATION - HEALTHEAST (OUTPATIENT)
Dept: ONCOLOGY | Facility: HOSPITAL | Age: 45
End: 2020-11-23

## 2020-12-03 ENCOUNTER — AMBULATORY - HEALTHEAST (OUTPATIENT)
Dept: INFUSION THERAPY | Facility: HOSPITAL | Age: 45
End: 2020-12-03

## 2020-12-03 ENCOUNTER — INFUSION - HEALTHEAST (OUTPATIENT)
Dept: INFUSION THERAPY | Facility: HOSPITAL | Age: 45
End: 2020-12-03

## 2020-12-03 ENCOUNTER — OFFICE VISIT - HEALTHEAST (OUTPATIENT)
Dept: ONCOLOGY | Facility: HOSPITAL | Age: 45
End: 2020-12-03

## 2020-12-03 DIAGNOSIS — T45.1X5A CHEMOTHERAPY-INDUCED NEUTROPENIA (H): ICD-10-CM

## 2020-12-03 DIAGNOSIS — C50.311 MALIGNANT NEOPLASM OF LOWER-INNER QUADRANT OF RIGHT FEMALE BREAST, UNSPECIFIED ESTROGEN RECEPTOR STATUS (H): ICD-10-CM

## 2020-12-03 DIAGNOSIS — D70.1 CHEMOTHERAPY-INDUCED NEUTROPENIA (H): ICD-10-CM

## 2020-12-03 LAB
ALBUMIN SERPL-MCNC: 4.3 G/DL (ref 3.5–5)
ALP SERPL-CCNC: 88 U/L (ref 45–120)
ALT SERPL W P-5'-P-CCNC: 23 U/L (ref 0–45)
ANION GAP SERPL CALCULATED.3IONS-SCNC: 10 MMOL/L (ref 5–18)
AST SERPL W P-5'-P-CCNC: 16 U/L (ref 0–40)
BASOPHILS # BLD AUTO: 0 THOU/UL (ref 0–0.2)
BASOPHILS NFR BLD AUTO: 0 % (ref 0–2)
BILIRUB SERPL-MCNC: 0.3 MG/DL (ref 0–1)
BUN SERPL-MCNC: 15 MG/DL (ref 8–22)
CALCIUM SERPL-MCNC: 9.7 MG/DL (ref 8.5–10.5)
CHLORIDE BLD-SCNC: 103 MMOL/L (ref 98–107)
CO2 SERPL-SCNC: 30 MMOL/L (ref 22–31)
CREAT SERPL-MCNC: 0.68 MG/DL (ref 0.6–1.1)
EOSINOPHIL COUNT (ABSOLUTE): 0 THOU/UL (ref 0–0.4)
EOSINOPHIL NFR BLD AUTO: 0 % (ref 0–6)
ERYTHROCYTE [DISTWIDTH] IN BLOOD BY AUTOMATED COUNT: 23.9 % (ref 11–14.5)
GFR SERPL CREATININE-BSD FRML MDRD: >60 ML/MIN/1.73M2
GLUCOSE BLD-MCNC: 122 MG/DL (ref 70–125)
HCT VFR BLD AUTO: 34.4 % (ref 35–47)
HGB BLD-MCNC: 11.2 G/DL (ref 12–16)
IMMATURE GRANULOCYTE % - MAN (DIFF): 5 %
IMMATURE GRANULOCYTE ABSOLUTE - MAN (DIFF): 0.3 THOU/UL
LYMPHOCYTES # BLD AUTO: 0.8 THOU/UL (ref 0.8–4.4)
LYMPHOCYTES NFR BLD AUTO: 12 % (ref 20–40)
MCH RBC QN AUTO: 27.7 PG (ref 27–34)
MCHC RBC AUTO-ENTMCNC: 32.6 G/DL (ref 32–36)
MCV RBC AUTO: 85 FL (ref 80–100)
METAMYELOCYTES (ABSOLUTE): 0.3 THOU/UL
METAMYELOCYTES NFR BLD MANUAL: 4 %
MONOCYTES # BLD AUTO: 0.4 THOU/UL (ref 0–0.9)
MONOCYTES NFR BLD AUTO: 6 % (ref 2–10)
MYELOCYTES (ABSOLUTE): 0.1 THOU/UL
MYELOCYTES NFR BLD MANUAL: 1 %
PLAT MORPH BLD: NORMAL
PLATELET # BLD AUTO: 158 THOU/UL (ref 140–440)
PMV BLD AUTO: 9.8 FL (ref 8.5–12.5)
POLYCHROMASIA BLD QL SMEAR: ABNORMAL
POTASSIUM BLD-SCNC: 4 MMOL/L (ref 3.5–5)
PROT SERPL-MCNC: 7.2 G/DL (ref 6–8)
RBC # BLD AUTO: 4.05 MILL/UL (ref 3.8–5.4)
SODIUM SERPL-SCNC: 143 MMOL/L (ref 136–145)
TEAR DROP: ABNORMAL
TOTAL NEUTROPHILS-ABS(DIFF): 5.2 THOU/UL (ref 2–7.7)
TOTAL NEUTROPHILS-REL(DIFF): 77 % (ref 50–70)
WBC: 6.7 THOU/UL (ref 4–11)

## 2020-12-03 ASSESSMENT — MIFFLIN-ST. JEOR: SCORE: 1169.78

## 2020-12-10 ENCOUNTER — INFUSION - HEALTHEAST (OUTPATIENT)
Dept: INFUSION THERAPY | Facility: HOSPITAL | Age: 45
End: 2020-12-10

## 2020-12-10 DIAGNOSIS — T45.1X5A CHEMOTHERAPY-INDUCED NEUTROPENIA (H): ICD-10-CM

## 2020-12-10 DIAGNOSIS — D70.1 CHEMOTHERAPY-INDUCED NEUTROPENIA (H): ICD-10-CM

## 2020-12-10 DIAGNOSIS — C50.311 MALIGNANT NEOPLASM OF LOWER-INNER QUADRANT OF RIGHT FEMALE BREAST, UNSPECIFIED ESTROGEN RECEPTOR STATUS (H): ICD-10-CM

## 2020-12-10 LAB
BASOPHILS # BLD AUTO: 0.1 THOU/UL (ref 0–0.2)
BASOPHILS NFR BLD AUTO: 1 % (ref 0–2)
EOSINOPHIL # BLD AUTO: 0 THOU/UL (ref 0–0.4)
EOSINOPHIL NFR BLD AUTO: 1 % (ref 0–6)
ERYTHROCYTE [DISTWIDTH] IN BLOOD BY AUTOMATED COUNT: 23.2 % (ref 11–14.5)
HCT VFR BLD AUTO: 33.8 % (ref 35–47)
HGB BLD-MCNC: 11.1 G/DL (ref 12–16)
IMM GRANULOCYTES # BLD: 0 THOU/UL
IMM GRANULOCYTES NFR BLD: 1 %
LYMPHOCYTES # BLD AUTO: 0.8 THOU/UL (ref 0.8–4.4)
LYMPHOCYTES NFR BLD AUTO: 16 % (ref 20–40)
MCH RBC QN AUTO: 28.1 PG (ref 27–34)
MCHC RBC AUTO-ENTMCNC: 32.8 G/DL (ref 32–36)
MCV RBC AUTO: 86 FL (ref 80–100)
MONOCYTES # BLD AUTO: 0.7 THOU/UL (ref 0–0.9)
MONOCYTES NFR BLD AUTO: 14 % (ref 2–10)
NEUTROPHILS # BLD AUTO: 3.2 THOU/UL (ref 2–7.7)
NEUTROPHILS NFR BLD AUTO: 68 % (ref 50–70)
PLATELET # BLD AUTO: 350 THOU/UL (ref 140–440)
PMV BLD AUTO: 9.4 FL (ref 8.5–12.5)
RBC # BLD AUTO: 3.95 MILL/UL (ref 3.8–5.4)
WBC: 4.7 THOU/UL (ref 4–11)

## 2020-12-15 ENCOUNTER — COMMUNICATION - HEALTHEAST (OUTPATIENT)
Dept: ONCOLOGY | Facility: HOSPITAL | Age: 45
End: 2020-12-15

## 2020-12-17 ENCOUNTER — INFUSION - HEALTHEAST (OUTPATIENT)
Dept: INFUSION THERAPY | Facility: HOSPITAL | Age: 45
End: 2020-12-17

## 2020-12-17 DIAGNOSIS — T45.1X5A CHEMOTHERAPY-INDUCED NEUTROPENIA (H): ICD-10-CM

## 2020-12-17 DIAGNOSIS — D70.1 CHEMOTHERAPY-INDUCED NEUTROPENIA (H): ICD-10-CM

## 2020-12-17 DIAGNOSIS — C50.311 MALIGNANT NEOPLASM OF LOWER-INNER QUADRANT OF RIGHT FEMALE BREAST, UNSPECIFIED ESTROGEN RECEPTOR STATUS (H): ICD-10-CM

## 2020-12-17 LAB
BASOPHILS # BLD AUTO: 0 THOU/UL (ref 0–0.2)
BASOPHILS NFR BLD AUTO: 1 % (ref 0–2)
EOSINOPHIL # BLD AUTO: 0.1 THOU/UL (ref 0–0.4)
EOSINOPHIL NFR BLD AUTO: 2 % (ref 0–6)
ERYTHROCYTE [DISTWIDTH] IN BLOOD BY AUTOMATED COUNT: ABNORMAL %
HCT VFR BLD AUTO: 36.2 % (ref 35–47)
HGB BLD-MCNC: 11.9 G/DL (ref 12–16)
IMM GRANULOCYTES # BLD: 0 THOU/UL
IMM GRANULOCYTES NFR BLD: 1 %
LYMPHOCYTES # BLD AUTO: 0.7 THOU/UL (ref 0.8–4.4)
LYMPHOCYTES NFR BLD AUTO: 15 % (ref 20–40)
MCH RBC QN AUTO: 28.7 PG (ref 27–34)
MCHC RBC AUTO-ENTMCNC: 32.9 G/DL (ref 32–36)
MCV RBC AUTO: 87 FL (ref 80–100)
MONOCYTES # BLD AUTO: 0.3 THOU/UL (ref 0–0.9)
MONOCYTES NFR BLD AUTO: 6 % (ref 2–10)
NEUTROPHILS # BLD AUTO: 3.3 THOU/UL (ref 2–7.7)
NEUTROPHILS NFR BLD AUTO: 75 % (ref 50–70)
PLATELET # BLD AUTO: 274 THOU/UL (ref 140–440)
PMV BLD AUTO: 9.3 FL (ref 8.5–12.5)
RBC # BLD AUTO: 4.15 MILL/UL (ref 3.8–5.4)
WBC: 4.4 THOU/UL (ref 4–11)

## 2020-12-22 ENCOUNTER — COMMUNICATION - HEALTHEAST (OUTPATIENT)
Dept: ADMINISTRATIVE | Facility: HOSPITAL | Age: 45
End: 2020-12-22

## 2020-12-23 ENCOUNTER — AMBULATORY - HEALTHEAST (OUTPATIENT)
Dept: INFUSION THERAPY | Facility: HOSPITAL | Age: 45
End: 2020-12-23

## 2020-12-23 ENCOUNTER — OFFICE VISIT - HEALTHEAST (OUTPATIENT)
Dept: ONCOLOGY | Facility: HOSPITAL | Age: 45
End: 2020-12-23

## 2020-12-23 ENCOUNTER — INFUSION - HEALTHEAST (OUTPATIENT)
Dept: INFUSION THERAPY | Facility: HOSPITAL | Age: 45
End: 2020-12-23

## 2020-12-23 DIAGNOSIS — T45.1X5A CHEMOTHERAPY-INDUCED NEUTROPENIA (H): ICD-10-CM

## 2020-12-23 DIAGNOSIS — C50.311 MALIGNANT NEOPLASM OF LOWER-INNER QUADRANT OF RIGHT FEMALE BREAST, UNSPECIFIED ESTROGEN RECEPTOR STATUS (H): ICD-10-CM

## 2020-12-23 DIAGNOSIS — D70.1 CHEMOTHERAPY-INDUCED NEUTROPENIA (H): ICD-10-CM

## 2020-12-23 LAB
ALBUMIN SERPL-MCNC: 4.3 G/DL (ref 3.5–5)
ALP SERPL-CCNC: 59 U/L (ref 45–120)
ALT SERPL W P-5'-P-CCNC: 44 U/L (ref 0–45)
ANION GAP SERPL CALCULATED.3IONS-SCNC: 7 MMOL/L (ref 5–18)
AST SERPL W P-5'-P-CCNC: 28 U/L (ref 0–40)
BASOPHILS # BLD AUTO: 0 THOU/UL (ref 0–0.2)
BASOPHILS NFR BLD AUTO: 1 % (ref 0–2)
BILIRUB SERPL-MCNC: 0.6 MG/DL (ref 0–1)
BUN SERPL-MCNC: 14 MG/DL (ref 8–22)
CALCIUM SERPL-MCNC: 9.2 MG/DL (ref 8.5–10.5)
CHLORIDE BLD-SCNC: 104 MMOL/L (ref 98–107)
CO2 SERPL-SCNC: 29 MMOL/L (ref 22–31)
CREAT SERPL-MCNC: 0.65 MG/DL (ref 0.6–1.1)
EOSINOPHIL # BLD AUTO: 0.1 THOU/UL (ref 0–0.4)
EOSINOPHIL NFR BLD AUTO: 2 % (ref 0–6)
ERYTHROCYTE [DISTWIDTH] IN BLOOD BY AUTOMATED COUNT: 22.2 % (ref 11–14.5)
GFR SERPL CREATININE-BSD FRML MDRD: >60 ML/MIN/1.73M2
GLUCOSE BLD-MCNC: 136 MG/DL (ref 70–125)
HCT VFR BLD AUTO: 35.4 % (ref 35–47)
HGB BLD-MCNC: 12 G/DL (ref 12–16)
IMM GRANULOCYTES # BLD: 0 THOU/UL
IMM GRANULOCYTES NFR BLD: 0 %
LYMPHOCYTES # BLD AUTO: 0.6 THOU/UL (ref 0.8–4.4)
LYMPHOCYTES NFR BLD AUTO: 19 % (ref 20–40)
MCH RBC QN AUTO: 29.8 PG (ref 27–34)
MCHC RBC AUTO-ENTMCNC: 33.9 G/DL (ref 32–36)
MCV RBC AUTO: 88 FL (ref 80–100)
MONOCYTES # BLD AUTO: 0.2 THOU/UL (ref 0–0.9)
MONOCYTES NFR BLD AUTO: 6 % (ref 2–10)
NEUTROPHILS # BLD AUTO: 2.3 THOU/UL (ref 2–7.7)
NEUTROPHILS NFR BLD AUTO: 72 % (ref 50–70)
OVALOCYTES: ABNORMAL
PLAT MORPH BLD: NORMAL
PLATELET # BLD AUTO: 255 THOU/UL (ref 140–440)
PMV BLD AUTO: 9.9 FL (ref 8.5–12.5)
POTASSIUM BLD-SCNC: 4 MMOL/L (ref 3.5–5)
PROT SERPL-MCNC: 7.2 G/DL (ref 6–8)
RBC # BLD AUTO: 4.03 MILL/UL (ref 3.8–5.4)
SODIUM SERPL-SCNC: 140 MMOL/L (ref 136–145)
TEAR DROP: ABNORMAL
WBC: 3.3 THOU/UL (ref 4–11)

## 2020-12-23 ASSESSMENT — MIFFLIN-ST. JEOR: SCORE: 1182.93

## 2020-12-31 ENCOUNTER — INFUSION - HEALTHEAST (OUTPATIENT)
Dept: INFUSION THERAPY | Facility: HOSPITAL | Age: 45
End: 2020-12-31

## 2020-12-31 DIAGNOSIS — C50.311 MALIGNANT NEOPLASM OF LOWER-INNER QUADRANT OF RIGHT FEMALE BREAST, UNSPECIFIED ESTROGEN RECEPTOR STATUS (H): ICD-10-CM

## 2020-12-31 DIAGNOSIS — T45.1X5A CHEMOTHERAPY-INDUCED NEUTROPENIA (H): ICD-10-CM

## 2020-12-31 DIAGNOSIS — D70.1 CHEMOTHERAPY-INDUCED NEUTROPENIA (H): ICD-10-CM

## 2020-12-31 LAB
BASOPHILS # BLD AUTO: 0 THOU/UL (ref 0–0.2)
BASOPHILS NFR BLD AUTO: 1 % (ref 0–2)
EOSINOPHIL # BLD AUTO: 0 THOU/UL (ref 0–0.4)
EOSINOPHIL NFR BLD AUTO: 1 % (ref 0–6)
ERYTHROCYTE [DISTWIDTH] IN BLOOD BY AUTOMATED COUNT: 20.9 % (ref 11–14.5)
HCT VFR BLD AUTO: 34.7 % (ref 35–47)
HGB BLD-MCNC: 11.9 G/DL (ref 12–16)
IMM GRANULOCYTES # BLD: 0 THOU/UL
IMM GRANULOCYTES NFR BLD: 1 %
LYMPHOCYTES # BLD AUTO: 0.8 THOU/UL (ref 0.8–4.4)
LYMPHOCYTES NFR BLD AUTO: 19 % (ref 20–40)
MCH RBC QN AUTO: 30.7 PG (ref 27–34)
MCHC RBC AUTO-ENTMCNC: 34.3 G/DL (ref 32–36)
MCV RBC AUTO: 89 FL (ref 80–100)
MONOCYTES # BLD AUTO: 0.5 THOU/UL (ref 0–0.9)
MONOCYTES NFR BLD AUTO: 10 % (ref 2–10)
NEUTROPHILS # BLD AUTO: 3 THOU/UL (ref 2–7.7)
NEUTROPHILS NFR BLD AUTO: 68 % (ref 50–70)
OVALOCYTES: ABNORMAL
PLAT MORPH BLD: NORMAL
PLATELET # BLD AUTO: 225 THOU/UL (ref 140–440)
PMV BLD AUTO: 9.4 FL (ref 8.5–12.5)
POLYCHROMASIA BLD QL SMEAR: ABNORMAL
RBC # BLD AUTO: 3.88 MILL/UL (ref 3.8–5.4)
WBC: 4.3 THOU/UL (ref 4–11)

## 2021-01-07 ENCOUNTER — INFUSION - HEALTHEAST (OUTPATIENT)
Dept: INFUSION THERAPY | Facility: HOSPITAL | Age: 46
End: 2021-01-07

## 2021-01-07 DIAGNOSIS — T45.1X5A CHEMOTHERAPY-INDUCED NEUTROPENIA (H): ICD-10-CM

## 2021-01-07 DIAGNOSIS — D70.1 CHEMOTHERAPY-INDUCED NEUTROPENIA (H): ICD-10-CM

## 2021-01-07 DIAGNOSIS — C50.311 MALIGNANT NEOPLASM OF LOWER-INNER QUADRANT OF RIGHT FEMALE BREAST, UNSPECIFIED ESTROGEN RECEPTOR STATUS (H): ICD-10-CM

## 2021-01-07 LAB
BASOPHILS # BLD AUTO: 0 THOU/UL (ref 0–0.2)
BASOPHILS NFR BLD AUTO: 1 % (ref 0–2)
EOSINOPHIL # BLD AUTO: 0.1 THOU/UL (ref 0–0.4)
EOSINOPHIL NFR BLD AUTO: 3 % (ref 0–6)
ERYTHROCYTE [DISTWIDTH] IN BLOOD BY AUTOMATED COUNT: 19.1 % (ref 11–14.5)
HCT VFR BLD AUTO: 37.3 % (ref 35–47)
HGB BLD-MCNC: 12.7 G/DL (ref 12–16)
IMM GRANULOCYTES # BLD: 0 THOU/UL
IMM GRANULOCYTES NFR BLD: 1 %
LYMPHOCYTES # BLD AUTO: 0.8 THOU/UL (ref 0.8–4.4)
LYMPHOCYTES NFR BLD AUTO: 21 % (ref 20–40)
MCH RBC QN AUTO: 30.9 PG (ref 27–34)
MCHC RBC AUTO-ENTMCNC: 34 G/DL (ref 32–36)
MCV RBC AUTO: 91 FL (ref 80–100)
MONOCYTES # BLD AUTO: 0.3 THOU/UL (ref 0–0.9)
MONOCYTES NFR BLD AUTO: 8 % (ref 2–10)
NEUTROPHILS # BLD AUTO: 2.7 THOU/UL (ref 2–7.7)
NEUTROPHILS NFR BLD AUTO: 68 % (ref 50–70)
PLATELET # BLD AUTO: 265 THOU/UL (ref 140–440)
PMV BLD AUTO: 9.7 FL (ref 8.5–12.5)
RBC # BLD AUTO: 4.11 MILL/UL (ref 3.8–5.4)
WBC: 4 THOU/UL (ref 4–11)

## 2021-01-11 ENCOUNTER — OFFICE VISIT - HEALTHEAST (OUTPATIENT)
Dept: ONCOLOGY | Facility: CLINIC | Age: 46
End: 2021-01-11

## 2021-01-11 DIAGNOSIS — F43.29 ADJUSTMENT DISORDER WITH MIXED EMOTIONAL FEATURES: ICD-10-CM

## 2021-01-11 DIAGNOSIS — C50.311 MALIGNANT NEOPLASM OF LOWER-INNER QUADRANT OF RIGHT FEMALE BREAST, UNSPECIFIED ESTROGEN RECEPTOR STATUS (H): ICD-10-CM

## 2021-01-12 ENCOUNTER — OFFICE VISIT - HEALTHEAST (OUTPATIENT)
Dept: ONCOLOGY | Facility: HOSPITAL | Age: 46
End: 2021-01-12

## 2021-01-12 ENCOUNTER — COMMUNICATION - HEALTHEAST (OUTPATIENT)
Dept: ONCOLOGY | Facility: HOSPITAL | Age: 46
End: 2021-01-12

## 2021-01-12 ENCOUNTER — COMMUNICATION - HEALTHEAST (OUTPATIENT)
Dept: ADMINISTRATIVE | Facility: HOSPITAL | Age: 46
End: 2021-01-12

## 2021-01-12 ENCOUNTER — AMBULATORY - HEALTHEAST (OUTPATIENT)
Dept: INFUSION THERAPY | Facility: HOSPITAL | Age: 46
End: 2021-01-12

## 2021-01-12 ENCOUNTER — INFUSION - HEALTHEAST (OUTPATIENT)
Dept: INFUSION THERAPY | Facility: HOSPITAL | Age: 46
End: 2021-01-12

## 2021-01-12 DIAGNOSIS — Z17.1 MALIGNANT NEOPLASM OF LOWER-INNER QUADRANT OF RIGHT BREAST OF FEMALE, ESTROGEN RECEPTOR NEGATIVE (H): ICD-10-CM

## 2021-01-12 DIAGNOSIS — C50.311 MALIGNANT NEOPLASM OF LOWER-INNER QUADRANT OF RIGHT BREAST OF FEMALE, ESTROGEN RECEPTOR NEGATIVE (H): ICD-10-CM

## 2021-01-12 DIAGNOSIS — T45.1X5A CHEMOTHERAPY-INDUCED NEUTROPENIA (H): ICD-10-CM

## 2021-01-12 DIAGNOSIS — D70.1 CHEMOTHERAPY-INDUCED NEUTROPENIA (H): ICD-10-CM

## 2021-01-12 DIAGNOSIS — C50.311 MALIGNANT NEOPLASM OF LOWER-INNER QUADRANT OF RIGHT FEMALE BREAST, UNSPECIFIED ESTROGEN RECEPTOR STATUS (H): ICD-10-CM

## 2021-01-12 LAB
ALBUMIN SERPL-MCNC: 4.4 G/DL (ref 3.5–5)
ALP SERPL-CCNC: 58 U/L (ref 45–120)
ALT SERPL W P-5'-P-CCNC: 34 U/L (ref 0–45)
ANION GAP SERPL CALCULATED.3IONS-SCNC: 9 MMOL/L (ref 5–18)
AST SERPL W P-5'-P-CCNC: 21 U/L (ref 0–40)
BASOPHILS # BLD AUTO: 0 THOU/UL (ref 0–0.2)
BASOPHILS NFR BLD AUTO: 1 % (ref 0–2)
BILIRUB SERPL-MCNC: 0.7 MG/DL (ref 0–1)
BUN SERPL-MCNC: 13 MG/DL (ref 8–22)
CALCIUM SERPL-MCNC: 9.7 MG/DL (ref 8.5–10.5)
CHLORIDE BLD-SCNC: 104 MMOL/L (ref 98–107)
CO2 SERPL-SCNC: 26 MMOL/L (ref 22–31)
CREAT SERPL-MCNC: 0.76 MG/DL (ref 0.6–1.1)
EOSINOPHIL # BLD AUTO: 0.1 THOU/UL (ref 0–0.4)
EOSINOPHIL NFR BLD AUTO: 2 % (ref 0–6)
ERYTHROCYTE [DISTWIDTH] IN BLOOD BY AUTOMATED COUNT: 17.6 % (ref 11–14.5)
GFR SERPL CREATININE-BSD FRML MDRD: >60 ML/MIN/1.73M2
GLUCOSE BLD-MCNC: 155 MG/DL (ref 70–125)
HCT VFR BLD AUTO: 37.7 % (ref 35–47)
HGB BLD-MCNC: 12.9 G/DL (ref 12–16)
IMM GRANULOCYTES # BLD: 0 THOU/UL
IMM GRANULOCYTES NFR BLD: 0 %
LYMPHOCYTES # BLD AUTO: 0.8 THOU/UL (ref 0.8–4.4)
LYMPHOCYTES NFR BLD AUTO: 20 % (ref 20–40)
MCH RBC QN AUTO: 31.5 PG (ref 27–34)
MCHC RBC AUTO-ENTMCNC: 34.2 G/DL (ref 32–36)
MCV RBC AUTO: 92 FL (ref 80–100)
MONOCYTES # BLD AUTO: 0.2 THOU/UL (ref 0–0.9)
MONOCYTES NFR BLD AUTO: 5 % (ref 2–10)
NEUTROPHILS # BLD AUTO: 2.8 THOU/UL (ref 2–7.7)
NEUTROPHILS NFR BLD AUTO: 72 % (ref 50–70)
PLATELET # BLD AUTO: 308 THOU/UL (ref 140–440)
PMV BLD AUTO: 9.6 FL (ref 8.5–12.5)
POTASSIUM BLD-SCNC: 4 MMOL/L (ref 3.5–5)
PROT SERPL-MCNC: 7.5 G/DL (ref 6–8)
RBC # BLD AUTO: 4.1 MILL/UL (ref 3.8–5.4)
SODIUM SERPL-SCNC: 139 MMOL/L (ref 136–145)
WBC: 3.8 THOU/UL (ref 4–11)

## 2021-01-13 ENCOUNTER — OFFICE VISIT - HEALTHEAST (OUTPATIENT)
Dept: ONCOLOGY | Facility: HOSPITAL | Age: 46
End: 2021-01-13

## 2021-01-13 DIAGNOSIS — Z80.0 FAMILY HISTORY OF PANCREATIC CANCER: ICD-10-CM

## 2021-01-13 DIAGNOSIS — C50.311 MALIGNANT NEOPLASM OF LOWER-INNER QUADRANT OF RIGHT FEMALE BREAST, UNSPECIFIED ESTROGEN RECEPTOR STATUS (H): ICD-10-CM

## 2021-01-13 DIAGNOSIS — Z71.83 ENCOUNTER FOR NONPROCREATIVE GENETIC COUNSELING: ICD-10-CM

## 2021-01-21 ENCOUNTER — INFUSION - HEALTHEAST (OUTPATIENT)
Dept: INFUSION THERAPY | Facility: HOSPITAL | Age: 46
End: 2021-01-21

## 2021-01-21 ENCOUNTER — RECORDS - HEALTHEAST (OUTPATIENT)
Dept: ADMINISTRATIVE | Facility: OTHER | Age: 46
End: 2021-01-21

## 2021-01-21 DIAGNOSIS — C50.311 MALIGNANT NEOPLASM OF LOWER-INNER QUADRANT OF RIGHT FEMALE BREAST, UNSPECIFIED ESTROGEN RECEPTOR STATUS (H): ICD-10-CM

## 2021-01-21 DIAGNOSIS — T45.1X5A CHEMOTHERAPY-INDUCED NEUTROPENIA (H): ICD-10-CM

## 2021-01-21 DIAGNOSIS — D70.1 CHEMOTHERAPY-INDUCED NEUTROPENIA (H): ICD-10-CM

## 2021-01-21 LAB
BASOPHILS # BLD AUTO: 0 THOU/UL (ref 0–0.2)
BASOPHILS NFR BLD AUTO: 1 % (ref 0–2)
EOSINOPHIL # BLD AUTO: 0 THOU/UL (ref 0–0.4)
EOSINOPHIL NFR BLD AUTO: 1 % (ref 0–6)
ERYTHROCYTE [DISTWIDTH] IN BLOOD BY AUTOMATED COUNT: 15.8 % (ref 11–14.5)
HCT VFR BLD AUTO: 34.9 % (ref 35–47)
HGB BLD-MCNC: 12 G/DL (ref 12–16)
IMM GRANULOCYTES # BLD: 0 THOU/UL
IMM GRANULOCYTES NFR BLD: 1 %
LYMPHOCYTES # BLD AUTO: 0.7 THOU/UL (ref 0.8–4.4)
LYMPHOCYTES NFR BLD AUTO: 21 % (ref 20–40)
MCH RBC QN AUTO: 32.4 PG (ref 27–34)
MCHC RBC AUTO-ENTMCNC: 34.4 G/DL (ref 32–36)
MCV RBC AUTO: 94 FL (ref 80–100)
MONOCYTES # BLD AUTO: 0.2 THOU/UL (ref 0–0.9)
MONOCYTES NFR BLD AUTO: 7 % (ref 2–10)
NEUTROPHILS # BLD AUTO: 2.2 THOU/UL (ref 2–7.7)
NEUTROPHILS NFR BLD AUTO: 70 % (ref 50–70)
PLATELET # BLD AUTO: 309 THOU/UL (ref 140–440)
PMV BLD AUTO: 9.6 FL (ref 8.5–12.5)
RBC # BLD AUTO: 3.7 MILL/UL (ref 3.8–5.4)
WBC: 3.2 THOU/UL (ref 4–11)

## 2021-01-26 ENCOUNTER — OFFICE VISIT - HEALTHEAST (OUTPATIENT)
Dept: ONCOLOGY | Facility: HOSPITAL | Age: 46
End: 2021-01-26

## 2021-01-26 DIAGNOSIS — C50.311 MALIGNANT NEOPLASM OF LOWER-INNER QUADRANT OF RIGHT FEMALE BREAST, UNSPECIFIED ESTROGEN RECEPTOR STATUS (H): ICD-10-CM

## 2021-01-26 DIAGNOSIS — F43.29 ADJUSTMENT DISORDER WITH MIXED EMOTIONAL FEATURES: ICD-10-CM

## 2021-01-28 ENCOUNTER — INFUSION - HEALTHEAST (OUTPATIENT)
Dept: INFUSION THERAPY | Facility: HOSPITAL | Age: 46
End: 2021-01-28

## 2021-01-28 DIAGNOSIS — C50.311 MALIGNANT NEOPLASM OF LOWER-INNER QUADRANT OF RIGHT FEMALE BREAST, UNSPECIFIED ESTROGEN RECEPTOR STATUS (H): ICD-10-CM

## 2021-01-28 DIAGNOSIS — T45.1X5A CHEMOTHERAPY-INDUCED NEUTROPENIA (H): ICD-10-CM

## 2021-01-28 DIAGNOSIS — D70.1 CHEMOTHERAPY-INDUCED NEUTROPENIA (H): ICD-10-CM

## 2021-01-28 LAB
BASOPHILS # BLD AUTO: 0 THOU/UL (ref 0–0.2)
BASOPHILS NFR BLD AUTO: 1 % (ref 0–2)
EOSINOPHIL # BLD AUTO: 0.1 THOU/UL (ref 0–0.4)
EOSINOPHIL NFR BLD AUTO: 3 % (ref 0–6)
ERYTHROCYTE [DISTWIDTH] IN BLOOD BY AUTOMATED COUNT: 14.3 % (ref 11–14.5)
HCT VFR BLD AUTO: 37.2 % (ref 35–47)
HGB BLD-MCNC: 12.8 G/DL (ref 12–16)
IMM GRANULOCYTES # BLD: 0 THOU/UL
IMM GRANULOCYTES NFR BLD: 1 %
LYMPHOCYTES # BLD AUTO: 0.8 THOU/UL (ref 0.8–4.4)
LYMPHOCYTES NFR BLD AUTO: 23 % (ref 20–40)
MCH RBC QN AUTO: 32.6 PG (ref 27–34)
MCHC RBC AUTO-ENTMCNC: 34.4 G/DL (ref 32–36)
MCV RBC AUTO: 95 FL (ref 80–100)
MONOCYTES # BLD AUTO: 0.3 THOU/UL (ref 0–0.9)
MONOCYTES NFR BLD AUTO: 7 % (ref 2–10)
NEUTROPHILS # BLD AUTO: 2.3 THOU/UL (ref 2–7.7)
NEUTROPHILS NFR BLD AUTO: 65 % (ref 50–70)
PLATELET # BLD AUTO: 296 THOU/UL (ref 140–440)
PMV BLD AUTO: 9.9 FL (ref 8.5–12.5)
RBC # BLD AUTO: 3.93 MILL/UL (ref 3.8–5.4)
WBC: 3.5 THOU/UL (ref 4–11)

## 2021-01-29 ENCOUNTER — COMMUNICATION - HEALTHEAST (OUTPATIENT)
Dept: ONCOLOGY | Facility: CLINIC | Age: 46
End: 2021-01-29

## 2021-02-01 ENCOUNTER — COMMUNICATION - HEALTHEAST (OUTPATIENT)
Dept: ONCOLOGY | Facility: HOSPITAL | Age: 46
End: 2021-02-01

## 2021-02-02 ENCOUNTER — COMMUNICATION - HEALTHEAST (OUTPATIENT)
Dept: SURGERY | Facility: CLINIC | Age: 46
End: 2021-02-02

## 2021-02-03 ENCOUNTER — INFUSION - HEALTHEAST (OUTPATIENT)
Dept: INFUSION THERAPY | Facility: HOSPITAL | Age: 46
End: 2021-02-03

## 2021-02-03 ENCOUNTER — AMBULATORY - HEALTHEAST (OUTPATIENT)
Dept: INFUSION THERAPY | Facility: HOSPITAL | Age: 46
End: 2021-02-03

## 2021-02-03 ENCOUNTER — OFFICE VISIT - HEALTHEAST (OUTPATIENT)
Dept: ONCOLOGY | Facility: HOSPITAL | Age: 46
End: 2021-02-03

## 2021-02-03 ENCOUNTER — HOSPITAL ENCOUNTER (OUTPATIENT)
Dept: CARDIOLOGY | Facility: HOSPITAL | Age: 46
Discharge: HOME | End: 2021-02-03
Attending: INTERNAL MEDICINE

## 2021-02-03 ENCOUNTER — COMMUNICATION - HEALTHEAST (OUTPATIENT)
Dept: ADMINISTRATIVE | Facility: HOSPITAL | Age: 46
End: 2021-02-03

## 2021-02-03 DIAGNOSIS — D70.1 CHEMOTHERAPY-INDUCED NEUTROPENIA (H): ICD-10-CM

## 2021-02-03 DIAGNOSIS — R00.2 PALPITATIONS: ICD-10-CM

## 2021-02-03 DIAGNOSIS — T45.1X5A CHEMOTHERAPY-INDUCED NEUTROPENIA (H): ICD-10-CM

## 2021-02-03 DIAGNOSIS — Z17.1 MALIGNANT NEOPLASM OF LOWER-INNER QUADRANT OF RIGHT BREAST OF FEMALE, ESTROGEN RECEPTOR NEGATIVE (H): ICD-10-CM

## 2021-02-03 DIAGNOSIS — C50.311 MALIGNANT NEOPLASM OF LOWER-INNER QUADRANT OF RIGHT FEMALE BREAST, UNSPECIFIED ESTROGEN RECEPTOR STATUS (H): ICD-10-CM

## 2021-02-03 DIAGNOSIS — C50.311 MALIGNANT NEOPLASM OF LOWER-INNER QUADRANT OF RIGHT BREAST OF FEMALE, ESTROGEN RECEPTOR NEGATIVE (H): ICD-10-CM

## 2021-02-03 LAB
ALBUMIN SERPL-MCNC: 4.3 G/DL (ref 3.5–5)
ALP SERPL-CCNC: 49 U/L (ref 45–120)
ALT SERPL W P-5'-P-CCNC: 38 U/L (ref 0–45)
ANION GAP SERPL CALCULATED.3IONS-SCNC: 8 MMOL/L (ref 5–18)
AST SERPL W P-5'-P-CCNC: 22 U/L (ref 0–40)
ATRIAL RATE - MUSE: 75 BPM
BASOPHILS # BLD AUTO: 0 THOU/UL (ref 0–0.2)
BASOPHILS NFR BLD AUTO: 1 % (ref 0–2)
BILIRUB SERPL-MCNC: 0.5 MG/DL (ref 0–1)
BUN SERPL-MCNC: 15 MG/DL (ref 8–22)
CALCIUM SERPL-MCNC: 9.8 MG/DL (ref 8.5–10.5)
CHLORIDE BLD-SCNC: 102 MMOL/L (ref 98–107)
CO2 SERPL-SCNC: 29 MMOL/L (ref 22–31)
CREAT SERPL-MCNC: 0.67 MG/DL (ref 0.6–1.1)
DIASTOLIC BLOOD PRESSURE - MUSE: NORMAL
EOSINOPHIL # BLD AUTO: 0.1 THOU/UL (ref 0–0.4)
EOSINOPHIL NFR BLD AUTO: 2 % (ref 0–6)
ERYTHROCYTE [DISTWIDTH] IN BLOOD BY AUTOMATED COUNT: 13.6 % (ref 11–14.5)
GFR SERPL CREATININE-BSD FRML MDRD: >60 ML/MIN/1.73M2
GLUCOSE BLD-MCNC: 103 MG/DL (ref 70–125)
HCT VFR BLD AUTO: 36.6 % (ref 35–47)
HGB BLD-MCNC: 12.6 G/DL (ref 12–16)
IMM GRANULOCYTES # BLD: 0 THOU/UL
IMM GRANULOCYTES NFR BLD: 1 %
INTERPRETATION ECG - MUSE: NORMAL
LYMPHOCYTES # BLD AUTO: 0.7 THOU/UL (ref 0.8–4.4)
LYMPHOCYTES NFR BLD AUTO: 22 % (ref 20–40)
MCH RBC QN AUTO: 32.9 PG (ref 27–34)
MCHC RBC AUTO-ENTMCNC: 34.4 G/DL (ref 32–36)
MCV RBC AUTO: 96 FL (ref 80–100)
MONOCYTES # BLD AUTO: 0.2 THOU/UL (ref 0–0.9)
MONOCYTES NFR BLD AUTO: 6 % (ref 2–10)
NEUTROPHILS # BLD AUTO: 2.3 THOU/UL (ref 2–7.7)
NEUTROPHILS NFR BLD AUTO: 69 % (ref 50–70)
P AXIS - MUSE: 60 DEGREES
PLATELET # BLD AUTO: 287 THOU/UL (ref 140–440)
PMV BLD AUTO: 9.7 FL (ref 8.5–12.5)
POTASSIUM BLD-SCNC: 4 MMOL/L (ref 3.5–5)
PR INTERVAL - MUSE: 130 MS
PROT SERPL-MCNC: 7.2 G/DL (ref 6–8)
QRS DURATION - MUSE: 84 MS
QT - MUSE: 398 MS
QTC - MUSE: 444 MS
R AXIS - MUSE: 49 DEGREES
RBC # BLD AUTO: 3.83 MILL/UL (ref 3.8–5.4)
SODIUM SERPL-SCNC: 139 MMOL/L (ref 136–145)
SYSTOLIC BLOOD PRESSURE - MUSE: NORMAL
T AXIS - MUSE: 49 DEGREES
VENTRICULAR RATE- MUSE: 75 BPM
WBC: 3.3 THOU/UL (ref 4–11)

## 2021-02-05 ENCOUNTER — HOSPITAL ENCOUNTER (OUTPATIENT)
Dept: SURGERY | Facility: CLINIC | Age: 46
Discharge: HOME | End: 2021-02-05
Attending: SPECIALIST

## 2021-02-05 ENCOUNTER — COMMUNICATION - HEALTHEAST (OUTPATIENT)
Dept: SURGERY | Facility: CLINIC | Age: 46
End: 2021-02-05

## 2021-02-05 DIAGNOSIS — C50.311 MALIGNANT NEOPLASM OF LOWER-INNER QUADRANT OF RIGHT BREAST OF FEMALE, ESTROGEN RECEPTOR NEGATIVE (H): ICD-10-CM

## 2021-02-05 DIAGNOSIS — Z17.1 MALIGNANT NEOPLASM OF LOWER-INNER QUADRANT OF RIGHT BREAST OF FEMALE, ESTROGEN RECEPTOR NEGATIVE (H): ICD-10-CM

## 2021-02-11 ENCOUNTER — INFUSION - HEALTHEAST (OUTPATIENT)
Dept: INFUSION THERAPY | Facility: HOSPITAL | Age: 46
End: 2021-02-11

## 2021-02-11 DIAGNOSIS — T45.1X5A CHEMOTHERAPY-INDUCED NEUTROPENIA (H): ICD-10-CM

## 2021-02-11 DIAGNOSIS — C50.311 MALIGNANT NEOPLASM OF LOWER-INNER QUADRANT OF RIGHT FEMALE BREAST, UNSPECIFIED ESTROGEN RECEPTOR STATUS (H): ICD-10-CM

## 2021-02-11 DIAGNOSIS — D70.1 CHEMOTHERAPY-INDUCED NEUTROPENIA (H): ICD-10-CM

## 2021-02-11 LAB
BASOPHILS # BLD AUTO: 0 THOU/UL (ref 0–0.2)
BASOPHILS NFR BLD AUTO: 1 % (ref 0–2)
EOSINOPHIL # BLD AUTO: 0 THOU/UL (ref 0–0.4)
EOSINOPHIL NFR BLD AUTO: 1 % (ref 0–6)
ERYTHROCYTE [DISTWIDTH] IN BLOOD BY AUTOMATED COUNT: 13.2 % (ref 11–14.5)
HCT VFR BLD AUTO: 37.5 % (ref 35–47)
HGB BLD-MCNC: 13.2 G/DL (ref 12–16)
IMM GRANULOCYTES # BLD: 0.1 THOU/UL
IMM GRANULOCYTES NFR BLD: 1 %
LYMPHOCYTES # BLD AUTO: 0.9 THOU/UL (ref 0.8–4.4)
LYMPHOCYTES NFR BLD AUTO: 17 % (ref 20–40)
MCH RBC QN AUTO: 33.6 PG (ref 27–34)
MCHC RBC AUTO-ENTMCNC: 35.2 G/DL (ref 32–36)
MCV RBC AUTO: 95 FL (ref 80–100)
MONOCYTES # BLD AUTO: 0.5 THOU/UL (ref 0–0.9)
MONOCYTES NFR BLD AUTO: 9 % (ref 2–10)
NEUTROPHILS # BLD AUTO: 3.6 THOU/UL (ref 2–7.7)
NEUTROPHILS NFR BLD AUTO: 71 % (ref 50–70)
PLATELET # BLD AUTO: 336 THOU/UL (ref 140–440)
PMV BLD AUTO: 9.5 FL (ref 8.5–12.5)
RBC # BLD AUTO: 3.93 MILL/UL (ref 3.8–5.4)
WBC: 5.1 THOU/UL (ref 4–11)

## 2021-02-18 ENCOUNTER — AMBULATORY - HEALTHEAST (OUTPATIENT)
Dept: INFUSION THERAPY | Facility: HOSPITAL | Age: 46
End: 2021-02-18

## 2021-02-18 ENCOUNTER — INFUSION - HEALTHEAST (OUTPATIENT)
Dept: INFUSION THERAPY | Facility: HOSPITAL | Age: 46
End: 2021-02-18

## 2021-02-18 DIAGNOSIS — C50.311 MALIGNANT NEOPLASM OF LOWER-INNER QUADRANT OF RIGHT FEMALE BREAST, UNSPECIFIED ESTROGEN RECEPTOR STATUS (H): ICD-10-CM

## 2021-02-18 DIAGNOSIS — D70.1 CHEMOTHERAPY-INDUCED NEUTROPENIA (H): ICD-10-CM

## 2021-02-18 DIAGNOSIS — T45.1X5A CHEMOTHERAPY-INDUCED NEUTROPENIA (H): ICD-10-CM

## 2021-02-18 LAB
BASOPHILS # BLD AUTO: 0 THOU/UL (ref 0–0.2)
BASOPHILS NFR BLD AUTO: 1 % (ref 0–2)
EOSINOPHIL # BLD AUTO: 0.1 THOU/UL (ref 0–0.4)
EOSINOPHIL NFR BLD AUTO: 2 % (ref 0–6)
ERYTHROCYTE [DISTWIDTH] IN BLOOD BY AUTOMATED COUNT: 12.6 % (ref 11–14.5)
HCT VFR BLD AUTO: 34.9 % (ref 35–47)
HGB BLD-MCNC: 12.3 G/DL (ref 12–16)
IMM GRANULOCYTES # BLD: 0.1 THOU/UL
IMM GRANULOCYTES NFR BLD: 1 %
LYMPHOCYTES # BLD AUTO: 0.6 THOU/UL (ref 0.8–4.4)
LYMPHOCYTES NFR BLD AUTO: 15 % (ref 20–40)
MCH RBC QN AUTO: 33.8 PG (ref 27–34)
MCHC RBC AUTO-ENTMCNC: 35.2 G/DL (ref 32–36)
MCV RBC AUTO: 96 FL (ref 80–100)
MONOCYTES # BLD AUTO: 0.3 THOU/UL (ref 0–0.9)
MONOCYTES NFR BLD AUTO: 8 % (ref 2–10)
NEUTROPHILS # BLD AUTO: 3 THOU/UL (ref 2–7.7)
NEUTROPHILS NFR BLD AUTO: 73 % (ref 50–70)
PLATELET # BLD AUTO: 274 THOU/UL (ref 140–440)
PMV BLD AUTO: 9.6 FL (ref 8.5–12.5)
RBC # BLD AUTO: 3.64 MILL/UL (ref 3.8–5.4)
WBC: 4.1 THOU/UL (ref 4–11)

## 2021-02-26 ENCOUNTER — OFFICE VISIT - HEALTHEAST (OUTPATIENT)
Dept: ONCOLOGY | Facility: CLINIC | Age: 46
End: 2021-02-26

## 2021-02-26 ENCOUNTER — AMBULATORY - HEALTHEAST (OUTPATIENT)
Dept: SURGERY | Facility: AMBULATORY SURGERY CENTER | Age: 46
End: 2021-02-26

## 2021-02-26 DIAGNOSIS — Z80.0 FAMILY HISTORY OF PANCREATIC CANCER: ICD-10-CM

## 2021-02-26 DIAGNOSIS — Z71.83 ENCOUNTER FOR NONPROCREATIVE GENETIC COUNSELING: ICD-10-CM

## 2021-02-26 DIAGNOSIS — Z11.59 ENCOUNTER FOR SCREENING FOR OTHER VIRAL DISEASES: ICD-10-CM

## 2021-02-26 DIAGNOSIS — C50.311 MALIGNANT NEOPLASM OF LOWER-INNER QUADRANT OF RIGHT FEMALE BREAST, UNSPECIFIED ESTROGEN RECEPTOR STATUS (H): ICD-10-CM

## 2021-03-03 ENCOUNTER — OFFICE VISIT - HEALTHEAST (OUTPATIENT)
Dept: ONCOLOGY | Facility: HOSPITAL | Age: 46
End: 2021-03-03

## 2021-03-03 ENCOUNTER — AMBULATORY - HEALTHEAST (OUTPATIENT)
Dept: INFUSION THERAPY | Facility: HOSPITAL | Age: 46
End: 2021-03-03

## 2021-03-03 DIAGNOSIS — C50.311 MALIGNANT NEOPLASM OF LOWER-INNER QUADRANT OF RIGHT FEMALE BREAST, UNSPECIFIED ESTROGEN RECEPTOR STATUS (H): ICD-10-CM

## 2021-03-03 DIAGNOSIS — C50.311 MALIGNANT NEOPLASM OF LOWER-INNER QUADRANT OF RIGHT BREAST OF FEMALE, ESTROGEN RECEPTOR NEGATIVE (H): ICD-10-CM

## 2021-03-03 DIAGNOSIS — Z17.1 MALIGNANT NEOPLASM OF LOWER-INNER QUADRANT OF RIGHT BREAST OF FEMALE, ESTROGEN RECEPTOR NEGATIVE (H): ICD-10-CM

## 2021-03-03 LAB
ALBUMIN SERPL-MCNC: 4.5 G/DL (ref 3.5–5)
ALP SERPL-CCNC: 56 U/L (ref 45–120)
ALT SERPL W P-5'-P-CCNC: 46 U/L (ref 0–45)
ANION GAP SERPL CALCULATED.3IONS-SCNC: 10 MMOL/L (ref 5–18)
AST SERPL W P-5'-P-CCNC: 26 U/L (ref 0–40)
BASOPHILS # BLD AUTO: 0.1 THOU/UL (ref 0–0.2)
BASOPHILS NFR BLD AUTO: 1 % (ref 0–2)
BILIRUB SERPL-MCNC: 0.4 MG/DL (ref 0–1)
BUN SERPL-MCNC: 12 MG/DL (ref 8–22)
CALCIUM SERPL-MCNC: 9.8 MG/DL (ref 8.5–10.5)
CHLORIDE BLD-SCNC: 102 MMOL/L (ref 98–107)
CO2 SERPL-SCNC: 28 MMOL/L (ref 22–31)
CREAT SERPL-MCNC: 0.72 MG/DL (ref 0.6–1.1)
EOSINOPHIL # BLD AUTO: 0.1 THOU/UL (ref 0–0.4)
EOSINOPHIL NFR BLD AUTO: 1 % (ref 0–6)
ERYTHROCYTE [DISTWIDTH] IN BLOOD BY AUTOMATED COUNT: 13.6 % (ref 11–14.5)
GFR SERPL CREATININE-BSD FRML MDRD: >60 ML/MIN/1.73M2
GLUCOSE BLD-MCNC: 115 MG/DL (ref 70–125)
HCT VFR BLD AUTO: 38.1 % (ref 35–47)
HGB BLD-MCNC: 13.3 G/DL (ref 12–16)
IMM GRANULOCYTES # BLD: 0 THOU/UL
IMM GRANULOCYTES NFR BLD: 1 %
LYMPHOCYTES # BLD AUTO: 1.1 THOU/UL (ref 0.8–4.4)
LYMPHOCYTES NFR BLD AUTO: 21 % (ref 20–40)
MCH RBC QN AUTO: 33 PG (ref 27–34)
MCHC RBC AUTO-ENTMCNC: 34.9 G/DL (ref 32–36)
MCV RBC AUTO: 95 FL (ref 80–100)
MONOCYTES # BLD AUTO: 0.6 THOU/UL (ref 0–0.9)
MONOCYTES NFR BLD AUTO: 10 % (ref 2–10)
NEUTROPHILS # BLD AUTO: 3.6 THOU/UL (ref 2–7.7)
NEUTROPHILS NFR BLD AUTO: 66 % (ref 50–70)
PLATELET # BLD AUTO: 300 THOU/UL (ref 140–440)
PMV BLD AUTO: 9.5 FL (ref 8.5–12.5)
POTASSIUM BLD-SCNC: 3.8 MMOL/L (ref 3.5–5)
PROT SERPL-MCNC: 7.5 G/DL (ref 6–8)
RBC # BLD AUTO: 4.03 MILL/UL (ref 3.8–5.4)
SODIUM SERPL-SCNC: 140 MMOL/L (ref 136–145)
WBC: 5.4 THOU/UL (ref 4–11)

## 2021-03-03 ASSESSMENT — MIFFLIN-ST. JEOR: SCORE: 1200.17

## 2021-03-21 ENCOUNTER — AMBULATORY - HEALTHEAST (OUTPATIENT)
Dept: FAMILY MEDICINE | Facility: CLINIC | Age: 46
End: 2021-03-21

## 2021-03-21 DIAGNOSIS — Z11.59 ENCOUNTER FOR SCREENING FOR OTHER VIRAL DISEASES: ICD-10-CM

## 2021-03-22 LAB
SARS-COV-2 PCR COMMENT: NORMAL
SARS-COV-2 RNA SPEC QL NAA+PROBE: NEGATIVE
SARS-COV-2 VIRUS SPECIMEN SOURCE: NORMAL

## 2021-03-22 ASSESSMENT — MIFFLIN-ST. JEOR: SCORE: 1206.74

## 2021-03-23 ENCOUNTER — COMMUNICATION - HEALTHEAST (OUTPATIENT)
Dept: SCHEDULING | Facility: CLINIC | Age: 46
End: 2021-03-23

## 2021-03-23 ENCOUNTER — ANESTHESIA - HEALTHEAST (OUTPATIENT)
Dept: SURGERY | Facility: AMBULATORY SURGERY CENTER | Age: 46
End: 2021-03-23

## 2021-03-24 ENCOUNTER — HOSPITAL ENCOUNTER (OUTPATIENT)
Dept: NUCLEAR MEDICINE | Facility: HOSPITAL | Age: 46
Discharge: HOME | End: 2021-03-24
Attending: SPECIALIST

## 2021-03-24 ENCOUNTER — SURGERY - HEALTHEAST (OUTPATIENT)
Dept: SURGERY | Facility: AMBULATORY SURGERY CENTER | Age: 46
End: 2021-03-24

## 2021-03-24 ENCOUNTER — RECORDS - HEALTHEAST (OUTPATIENT)
Dept: ADMINISTRATIVE | Facility: OTHER | Age: 46
End: 2021-03-24

## 2021-03-24 ENCOUNTER — HOSPITAL ENCOUNTER (OUTPATIENT)
Dept: MAMMOGRAPHY | Facility: CLINIC | Age: 46
Discharge: HOME | End: 2021-03-24
Attending: SPECIALIST

## 2021-03-24 DIAGNOSIS — C50.311 MALIGNANT NEOPLASM OF LOWER-INNER QUADRANT OF RIGHT BREAST OF FEMALE, ESTROGEN RECEPTOR NEGATIVE (H): ICD-10-CM

## 2021-03-24 DIAGNOSIS — Z17.1 MALIGNANT NEOPLASM OF LOWER-INNER QUADRANT OF RIGHT BREAST OF FEMALE, ESTROGEN RECEPTOR NEGATIVE (H): ICD-10-CM

## 2021-03-24 ASSESSMENT — MIFFLIN-ST. JEOR: SCORE: 1206.74

## 2021-03-29 ENCOUNTER — COMMUNICATION - HEALTHEAST (OUTPATIENT)
Dept: SURGERY | Facility: CLINIC | Age: 46
End: 2021-03-29

## 2021-04-05 ENCOUNTER — COMMUNICATION - HEALTHEAST (OUTPATIENT)
Dept: SURGERY | Facility: CLINIC | Age: 46
End: 2021-04-05

## 2021-04-06 ENCOUNTER — HOSPITAL ENCOUNTER (OUTPATIENT)
Dept: SURGERY | Facility: CLINIC | Age: 46
Discharge: HOME | End: 2021-04-06
Attending: SPECIALIST

## 2021-04-06 DIAGNOSIS — Z48.89 POSTOPERATIVE VISIT: ICD-10-CM

## 2021-04-07 ENCOUNTER — OFFICE VISIT - HEALTHEAST (OUTPATIENT)
Dept: ONCOLOGY | Facility: HOSPITAL | Age: 46
End: 2021-04-07

## 2021-04-07 DIAGNOSIS — Z17.1 MALIGNANT NEOPLASM OF LOWER-INNER QUADRANT OF RIGHT BREAST OF FEMALE, ESTROGEN RECEPTOR NEGATIVE (H): ICD-10-CM

## 2021-04-07 DIAGNOSIS — C50.311 MALIGNANT NEOPLASM OF LOWER-INNER QUADRANT OF RIGHT BREAST OF FEMALE, ESTROGEN RECEPTOR NEGATIVE (H): ICD-10-CM

## 2021-04-16 ENCOUNTER — COMMUNICATION - HEALTHEAST (OUTPATIENT)
Dept: SURGERY | Facility: CLINIC | Age: 46
End: 2021-04-16

## 2021-04-19 ENCOUNTER — HOSPITAL ENCOUNTER (OUTPATIENT)
Dept: SURGERY | Facility: CLINIC | Age: 46
Discharge: HOME | End: 2021-04-19

## 2021-04-19 DIAGNOSIS — Z17.1 MALIGNANT NEOPLASM OF LOWER-INNER QUADRANT OF RIGHT BREAST OF FEMALE, ESTROGEN RECEPTOR NEGATIVE (H): ICD-10-CM

## 2021-04-19 DIAGNOSIS — C50.311 MALIGNANT NEOPLASM OF LOWER-INNER QUADRANT OF RIGHT BREAST OF FEMALE, ESTROGEN RECEPTOR NEGATIVE (H): ICD-10-CM

## 2021-04-22 ENCOUNTER — COMMUNICATION - HEALTHEAST (OUTPATIENT)
Dept: ONCOLOGY | Facility: HOSPITAL | Age: 46
End: 2021-04-22

## 2021-04-22 DIAGNOSIS — C50.311 MALIGNANT NEOPLASM OF LOWER-INNER QUADRANT OF RIGHT FEMALE BREAST, UNSPECIFIED ESTROGEN RECEPTOR STATUS (H): ICD-10-CM

## 2021-04-22 RX ORDER — FERROUS SULFATE 325(65) MG
TABLET ORAL
Qty: 90 TABLET | Refills: 1 | Status: SHIPPED | OUTPATIENT
Start: 2021-04-22 | End: 2021-11-10

## 2021-04-26 ENCOUNTER — RECORDS - HEALTHEAST (OUTPATIENT)
Dept: ADMINISTRATIVE | Facility: OTHER | Age: 46
End: 2021-04-26

## 2021-04-28 ENCOUNTER — COMMUNICATION - HEALTHEAST (OUTPATIENT)
Dept: INTERNAL MEDICINE | Facility: CLINIC | Age: 46
End: 2021-04-28

## 2021-04-28 DIAGNOSIS — I10 ESSENTIAL HYPERTENSION: ICD-10-CM

## 2021-04-29 ENCOUNTER — HOSPITAL ENCOUNTER (OUTPATIENT)
Dept: SURGERY | Facility: CLINIC | Age: 46
Discharge: HOME | End: 2021-04-29
Attending: SPECIALIST

## 2021-04-29 DIAGNOSIS — Z48.89 POSTOPERATIVE VISIT: ICD-10-CM

## 2021-04-29 RX ORDER — LISINOPRIL/HYDROCHLOROTHIAZIDE 10-12.5 MG
TABLET ORAL
Qty: 90 TABLET | Refills: 1 | Status: SHIPPED | OUTPATIENT
Start: 2021-04-29 | End: 2021-11-04

## 2021-05-05 ENCOUNTER — COMMUNICATION - HEALTHEAST (OUTPATIENT)
Dept: ONCOLOGY | Facility: HOSPITAL | Age: 46
End: 2021-05-05

## 2021-05-05 DIAGNOSIS — C50.311 MALIGNANT NEOPLASM OF LOWER-INNER QUADRANT OF RIGHT BREAST OF FEMALE, ESTROGEN RECEPTOR NEGATIVE (H): ICD-10-CM

## 2021-05-05 DIAGNOSIS — Z17.1 MALIGNANT NEOPLASM OF LOWER-INNER QUADRANT OF RIGHT BREAST OF FEMALE, ESTROGEN RECEPTOR NEGATIVE (H): ICD-10-CM

## 2021-05-05 RX ORDER — TAMOXIFEN CITRATE 20 MG/1
20 TABLET ORAL DAILY
Qty: 90 TABLET | Refills: 2 | Status: SHIPPED | OUTPATIENT
Start: 2021-05-05 | End: 2021-10-15

## 2021-05-06 ENCOUNTER — COMMUNICATION - HEALTHEAST (OUTPATIENT)
Dept: ONCOLOGY | Facility: HOSPITAL | Age: 46
End: 2021-05-06

## 2021-05-27 VITALS
DIASTOLIC BLOOD PRESSURE: 56 MMHG | SYSTOLIC BLOOD PRESSURE: 112 MMHG | TEMPERATURE: 98.3 F | HEART RATE: 80 BPM | OXYGEN SATURATION: 98 %

## 2021-05-27 VITALS
TEMPERATURE: 98.4 F | OXYGEN SATURATION: 97 % | DIASTOLIC BLOOD PRESSURE: 66 MMHG | SYSTOLIC BLOOD PRESSURE: 115 MMHG | HEART RATE: 68 BPM

## 2021-05-27 VITALS
DIASTOLIC BLOOD PRESSURE: 70 MMHG | TEMPERATURE: 98.8 F | SYSTOLIC BLOOD PRESSURE: 138 MMHG | HEART RATE: 71 BPM | OXYGEN SATURATION: 97 %

## 2021-05-27 VITALS
HEART RATE: 84 BPM | TEMPERATURE: 97.9 F | DIASTOLIC BLOOD PRESSURE: 62 MMHG | SYSTOLIC BLOOD PRESSURE: 98 MMHG | OXYGEN SATURATION: 97 %

## 2021-05-27 VITALS
SYSTOLIC BLOOD PRESSURE: 111 MMHG | OXYGEN SATURATION: 97 % | DIASTOLIC BLOOD PRESSURE: 67 MMHG | TEMPERATURE: 97.5 F | HEART RATE: 68 BPM

## 2021-05-27 VITALS
OXYGEN SATURATION: 100 % | SYSTOLIC BLOOD PRESSURE: 130 MMHG | HEART RATE: 66 BPM | TEMPERATURE: 97.9 F | DIASTOLIC BLOOD PRESSURE: 72 MMHG

## 2021-05-27 VITALS — TEMPERATURE: 97.4 F | HEART RATE: 85 BPM | SYSTOLIC BLOOD PRESSURE: 119 MMHG | DIASTOLIC BLOOD PRESSURE: 66 MMHG

## 2021-05-27 VITALS
TEMPERATURE: 97.8 F | SYSTOLIC BLOOD PRESSURE: 112 MMHG | DIASTOLIC BLOOD PRESSURE: 63 MMHG | OXYGEN SATURATION: 98 % | HEART RATE: 73 BPM

## 2021-05-27 ASSESSMENT — PATIENT HEALTH QUESTIONNAIRE - PHQ9: SUM OF ALL RESPONSES TO PHQ QUESTIONS 1-9: 1

## 2021-05-28 ASSESSMENT — ANXIETY QUESTIONNAIRES: GAD7 TOTAL SCORE: 0

## 2021-05-31 VITALS — BODY MASS INDEX: 22.36 KG/M2 | WEIGHT: 131 LBS | HEIGHT: 64 IN

## 2021-06-01 VITALS — BODY MASS INDEX: 22.18 KG/M2 | WEIGHT: 127.2 LBS

## 2021-06-02 VITALS — BODY MASS INDEX: 22.07 KG/M2 | WEIGHT: 129.3 LBS | HEIGHT: 64 IN

## 2021-06-03 ENCOUNTER — RECORDS - HEALTHEAST (OUTPATIENT)
Dept: ADMINISTRATIVE | Facility: CLINIC | Age: 46
End: 2021-06-03

## 2021-06-04 VITALS
HEIGHT: 64 IN | RESPIRATION RATE: 20 BRPM | TEMPERATURE: 97.5 F | SYSTOLIC BLOOD PRESSURE: 104 MMHG | OXYGEN SATURATION: 100 % | WEIGHT: 104 LBS | BODY MASS INDEX: 17.75 KG/M2 | HEART RATE: 82 BPM | DIASTOLIC BLOOD PRESSURE: 62 MMHG

## 2021-06-05 VITALS
HEART RATE: 85 BPM | DIASTOLIC BLOOD PRESSURE: 77 MMHG | OXYGEN SATURATION: 97 % | SYSTOLIC BLOOD PRESSURE: 118 MMHG | BODY MASS INDEX: 22.49 KG/M2 | WEIGHT: 129 LBS | TEMPERATURE: 97.8 F

## 2021-06-05 VITALS
DIASTOLIC BLOOD PRESSURE: 73 MMHG | HEART RATE: 61 BPM | TEMPERATURE: 97.8 F | OXYGEN SATURATION: 100 % | WEIGHT: 124.3 LBS | SYSTOLIC BLOOD PRESSURE: 123 MMHG | HEIGHT: 63 IN | BODY MASS INDEX: 22.02 KG/M2

## 2021-06-05 VITALS
DIASTOLIC BLOOD PRESSURE: 80 MMHG | HEART RATE: 71 BPM | SYSTOLIC BLOOD PRESSURE: 126 MMHG | WEIGHT: 130.3 LBS | HEIGHT: 63 IN | BODY MASS INDEX: 23.09 KG/M2 | OXYGEN SATURATION: 100 %

## 2021-06-05 VITALS — HEIGHT: 63 IN | WEIGHT: 128 LBS | BODY MASS INDEX: 22.68 KG/M2

## 2021-06-05 VITALS — BODY MASS INDEX: 22.2 KG/M2 | HEIGHT: 64 IN | WEIGHT: 130 LBS

## 2021-06-05 VITALS
SYSTOLIC BLOOD PRESSURE: 110 MMHG | HEIGHT: 63 IN | DIASTOLIC BLOOD PRESSURE: 68 MMHG | HEART RATE: 60 BPM | TEMPERATURE: 97.7 F | BODY MASS INDEX: 21.56 KG/M2 | WEIGHT: 121.7 LBS | OXYGEN SATURATION: 100 %

## 2021-06-05 VITALS — WEIGHT: 130 LBS | HEIGHT: 64 IN | BODY MASS INDEX: 22.2 KG/M2

## 2021-06-05 VITALS
DIASTOLIC BLOOD PRESSURE: 80 MMHG | BODY MASS INDEX: 22.67 KG/M2 | HEART RATE: 74 BPM | WEIGHT: 130 LBS | SYSTOLIC BLOOD PRESSURE: 112 MMHG

## 2021-06-05 VITALS
SYSTOLIC BLOOD PRESSURE: 129 MMHG | DIASTOLIC BLOOD PRESSURE: 70 MMHG | WEIGHT: 125 LBS | TEMPERATURE: 98.3 F | HEART RATE: 77 BPM | OXYGEN SATURATION: 98 % | BODY MASS INDEX: 22.14 KG/M2

## 2021-06-05 VITALS
TEMPERATURE: 98.1 F | HEART RATE: 49 BPM | BODY MASS INDEX: 22.68 KG/M2 | DIASTOLIC BLOOD PRESSURE: 60 MMHG | OXYGEN SATURATION: 100 % | SYSTOLIC BLOOD PRESSURE: 131 MMHG | HEIGHT: 63 IN | WEIGHT: 128 LBS

## 2021-06-05 VITALS
TEMPERATURE: 98 F | OXYGEN SATURATION: 100 % | WEIGHT: 125.2 LBS | SYSTOLIC BLOOD PRESSURE: 117 MMHG | HEART RATE: 54 BPM | DIASTOLIC BLOOD PRESSURE: 66 MMHG | BODY MASS INDEX: 22.18 KG/M2 | HEIGHT: 63 IN

## 2021-06-05 VITALS — BODY MASS INDEX: 21.62 KG/M2 | WEIGHT: 124 LBS

## 2021-06-05 VITALS
BODY MASS INDEX: 21.85 KG/M2 | HEIGHT: 64 IN | BODY MASS INDEX: 21.85 KG/M2 | WEIGHT: 128 LBS | WEIGHT: 128 LBS | HEIGHT: 64 IN

## 2021-06-05 VITALS
BODY MASS INDEX: 22.5 KG/M2 | OXYGEN SATURATION: 99 % | DIASTOLIC BLOOD PRESSURE: 67 MMHG | TEMPERATURE: 98.2 F | WEIGHT: 127 LBS | SYSTOLIC BLOOD PRESSURE: 117 MMHG | HEART RATE: 80 BPM

## 2021-06-05 VITALS
OXYGEN SATURATION: 98 % | BODY MASS INDEX: 22.41 KG/M2 | TEMPERATURE: 98.1 F | DIASTOLIC BLOOD PRESSURE: 74 MMHG | SYSTOLIC BLOOD PRESSURE: 110 MMHG | HEIGHT: 63 IN | HEART RATE: 80 BPM | WEIGHT: 126.5 LBS

## 2021-06-05 VITALS
BODY MASS INDEX: 21.26 KG/M2 | WEIGHT: 120 LBS | DIASTOLIC BLOOD PRESSURE: 59 MMHG | OXYGEN SATURATION: 100 % | SYSTOLIC BLOOD PRESSURE: 111 MMHG | TEMPERATURE: 98.4 F | HEART RATE: 57 BPM

## 2021-06-05 VITALS
HEIGHT: 63 IN | WEIGHT: 123.6 LBS | TEMPERATURE: 97.9 F | SYSTOLIC BLOOD PRESSURE: 107 MMHG | HEART RATE: 68 BPM | OXYGEN SATURATION: 98 % | BODY MASS INDEX: 21.9 KG/M2 | DIASTOLIC BLOOD PRESSURE: 64 MMHG

## 2021-06-05 NOTE — TELEPHONE ENCOUNTER
Patient Returning Call  Reason for call:  Patient called back.  Information relayed to patient:  Informed of Dr Gilbert's message listed below.  Patient states understanding and agrees with plan.  Patient has additional questions:  No  If YES, what are your questions/concerns:    Okay to leave a detailed message?: No call back needed

## 2021-06-05 NOTE — TELEPHONE ENCOUNTER
Called and lvm for patient to call back for message from provider.    Please relay message and assist as needed.    Will mail letter with providers who are taking new patients

## 2021-06-05 NOTE — PROGRESS NOTES
Internal Medicine Office Visit  [facility]  Patient Name: Jes Mistry  Patient Age: 44 y.o.  YOB: 1975  MRN: [id number]    Date of Visit: 2020  Reason for Office Visit:   Chief Complaint   Patient presents with     Establish Care     Hypertension     Taking Lisinopril. Will need possible refills.            Assessment / Plan / Medical Decision Makin. Encounter to establish care  Recommend scheduling annual eye exam patient is due for a Pap this year.    2. Essential hypertension  Now hypotensive and symptomatic will recommend holding blood pressure medication at this time she will check her blood pressure 1 time per week if it remains less than 120/80 she will continue to discontinue check blood pressure 1 time per month at that time.  If blood pressure elevates will recommend starting patient on low-dose lisinopril 10 mg  - Basic Metabolic Panel  - Lipid Cascade RANDOM  - HM1(CBC and Differential)  - Thyroid Stimulating Hormone (TSH)  - HM1 (CBC with Diff)    3. Lipoma of skin and subcutaneous tissue   Would recommend surgical consult, if becomes bothersome patient may contact my office for referral.       Orders Placed This Encounter   Procedures     Basic Metabolic Panel     Lipid Cascade RANDOM     Thyroid Stimulating Hormone (TSH)     HM1 (CBC with Diff)   Followup: Return in about 1 year (around 2021). earlier if needed.    Health Maintenance Review  Health Maintenance   Topic Date Due     HIV SCREENING  1990     ADVANCE CARE PLANNING  1993     PAP SMEAR  1996     PREVENTIVE CARE VISIT  2017     TD 18+ HE  2020 (Originally 1993)     TDAP ADULT ONE TIME DOSE  2020 (Originally 1993)     INFLUENZA VACCINE RULE BASED (1) 2020 (Originally 2019)         I am having Jes Mistry maintain her diphenhydrAMINE and lisinopril-hydrochlorothiazide.      HPI:  Jes Mistry is a 44  y.o. year old who presents to the office today with chronic conditions including  hypertension, thyroid nodule, non-smoker,Hyperglycemia, ganglion cyst of the wrist..  Patient continues on lisinopril hydrochlorothiazide 10-12.5 mg daily for her hypertension.  She has not been seen in clinic since 2018..  Patient's last lipid panel was in 2016 with a triglyceride level 320, total cholesterol 169, LDL 72, HDL 33.  Patient reports that time she gets dizzy when rising from a sitting to standing position blood pressure latest 104/62.    Health maintenance  Mammogram : none  Dental: 2019  Pap: 2015 due this year  Eye: 20 years      Lipoma on the cervical spine.  Patient reports that she did have an excision several years ago and has now returned.    Review of Systems- pertinent positive in bold:  Constitutional: Fever, chills, night sweats, fainting, weight change, fatigue, dizziness, sleeping difficulties, loud snoring/pauses in breathing  Eyes: change in vision, blurred or double vision, redness/eye pain  Ears, nose, mouth, throat: change in hearing, ear pain, hoarseness, difficulty swallowing, sores in the mouth or throat  Respiratory: shortness of breath, cough, bloody sputum, wheezing  Cardiovascular: chest pain, palpitations   Gastrointestinal: abdominal pain, heartburn/indigestion, nausea/vomiting, change in appetite, change in bowel habits, constipation or diarrhea, rectal bleeding/dark stools, difficulty swallowing  Urinary: painful urination, frequent urination, urinary urgency/incontinence, blood in urine/dark urine, nocturia (new or increasing), muscle cramps, swelling of hands, feet, ankles, leg pain/redness  Skin: change in moles/freckles, rash, nodules  Hematologic/lymphatic: swollen lymph glands, abnormal bruising/bleeding  Endocrine: excessive thirst/urination, cold or heat intolerance  Neurologic/emotional: worrisome memory change, numbness/tingling, anxiety, mood swings      Current Scheduled  "Meds:  Outpatient Encounter Medications as of 2020   Medication Sig Dispense Refill     lisinopril-hydrochlorothiazide (PRINZIDE,ZESTORETIC) 10-12.5 mg per tablet Take 1 tablet by mouth daily. 30 tablet 0     diphenhydrAMINE (BENADRYL) 25 mg capsule Take 25 mg by mouth every 6 (six) hours as needed for itching.       No facility-administered encounter medications on file as of 2020.      Past Medical History:   Diagnosis Date     HTN (hypertension)      Nonsmoker      Thyroid nodule      Past Surgical History:   Procedure Laterality Date      SECTION       Social History     Tobacco Use     Smoking status: Never Smoker     Smokeless tobacco: Never Used   Substance Use Topics     Alcohol use: No     Drug use: No     Family History   Problem Relation Age of Onset     Hypertension Mother      Hypertension Father      Social History     Social History Narrative     Not on file       Objective / Physical Examination:  Vitals:    20 0840   BP: 104/62   Pulse: 82   Resp: 20   Temp: 97.5  F (36.4  C)   SpO2: 100%   Weight: 104 lb (47.2 kg)   Height: 5' 3.5\" (1.613 m)     Wt Readings from Last 3 Encounters:   20 104 lb (47.2 kg)   18 129 lb 4.8 oz (58.7 kg)   18 127 lb 3.2 oz (57.7 kg)     Body mass index is 18.13 kg/m .     General Appearance: Alert and oriented, cooperative, affect appropriate, speech clear, in no apparent distress  Head: Normocephalic, atraumatic  Eyes:   Conjunctivae clear and sclerae non-icteric  Nose: Septum midline, nares patent,  mucosa moist and without drainage  Throat: Lips and mucosa moist.   Neck: Supple, trachea midline. No cervical adenopathy.  Lipoma is noted posterior neck  Lungs: Clear to auscultation bilaterally. No adventitious lung sounds noted. Normal inspiratory and expiratory effort  Cardiovascular: Regular rate, normal S1, S2. No murmurs, rubs, or gallops  Extremities: Pulses 2+ and equal throughout. No edema.   Integumentary: Warm and " dry.   Neuro: Alert and oriented, follows commands appropriately.     No results found.  No results found for this or any previous visit (from the past 240 hour(s)).        Cindy Michael, CNP

## 2021-06-05 NOTE — TELEPHONE ENCOUNTER
Please call patient -    ______________________________________________________________________     Home Phone:  [phone] (home) [phone] (work)    Cell phone:   Telephone Information:   Mobile [phone]     ______________________________________________________________________     She is overdue for a follow up of her hypertension.  Her insurance requires evaluation for this every year to continue medications.    She needs to schedule an appointment with a provider who takes new patients.      A 30 day supply of her blood pressure was given.  No further refills without a visit.    Elio Gilbert MD  [facility]  1/17/2020, 9:34 AM

## 2021-06-06 NOTE — TELEPHONE ENCOUNTER
No return call from patient x2.  Will await for callback or please call pharmacy to determine if they contacted us for refill request through automated refill

## 2021-06-11 NOTE — TELEPHONE ENCOUNTER
See 9/22 enc for more information    Note      Medication Request  Medication name:     Disp  Refills  Start  End  JOE    lisinopril-hydrochlorothiazide (PRINZIDE,ZESTORETIC) 10-12.5 mg per tablet  30 tablet  0  1/17/2020    No    Sig - Route: Take 1 tablet by mouth daily. - Oral    Sent to pharmacy as: lisinopril 10 mg-hydrochlorothiazide 12.5 mg tablet (PRINZIDE,ZESTORETIC)    Notes to Pharmacy: NO FURTHER refills without a visit.    E-Prescribing Status: Receipt confirmed by pharmacy (1/17/2020  9:36 AM CST)          Requested [facility]: [facility]  Reason for request: Caller stated that she has been feeling very off and had checked her blood pressure a while ago and it was reading high and since she still had some medication left she decided  to start taking them again and it seemed to have helped a lot. Caller is now wondering if she is needing to see Cindy Michael CNP for the medication since the last time was spoken about getting her on a different kind.   When did you use medication last?:    Patient offered appointment:  patient declined  Okay to leave a detailed message: yes

## 2021-06-11 NOTE — PROGRESS NOTES
"Chief Complaint:  Right Breast Mass or Lesion    HPI:  This is a 45 y.o. woman who I'm asked to see by Cindy Michael CNP for evaluation of a right breast mass.  This was picked up by the patient.  She first noticed it about a month ago and feels like it is grown significantly since she first noticed it.  She underwent a mammogram and ultrasound.  A needle biopsy was not done since it appears to be benign. She has no family history of breast cancer.      Past Medical History:  Past Medical History:   Diagnosis Date     HTN (hypertension)      Nonsmoker      Thyroid nodule      Past Surgical History:   Procedure Laterality Date      SECTION         Meds:    Current Outpatient Medications:      diphenhydrAMINE (BENADRYL) 25 mg capsule, Take 25 mg by mouth every 6 (six) hours as needed for itching., Disp: , Rfl:      lisinopriL-hydrochlorothiazide (PRINZIDE,ZESTORETIC) 10-12.5 mg per tablet, Take 1 tablet by mouth daily., Disp: 90 tablet, Rfl: 1    Allergies:  Allergies   Allergen Reactions     Sulfa (Sulfonamide Antibiotics)        Social History:   reports that she has never smoked. She has never used smokeless tobacco. She reports that she does not drink alcohol or use drugs.    ROS:  A 12 point comprehensive review of systems was negative except as noted.    Physical exam:  Resp 16   Ht 5' 3.5\" (1.613 m)   Wt 130 lb (59 kg)   Breastfeeding No   BMI 22.67 kg/m    General: alert, appears stated age and cooperative  Lungs: clear to auscultation bilaterally  CV: regular rate and rhythm, S1, S2 normal, no murmur, click, rub or gallop  Breast: Large, compared to the size of her breasts, mass in the lower portion of the right breast.  Measures about 2.5 x 1.5 cm.  Has very smooth borders.  Mobile.  Lymph Nodes: no axillary nodes palpated  Neuro: Grossly normal     Procedure: After sterile prep with alcohol.  A 21-gauge needle was used to take multiple passes through this mass.  A fair amount of " material was obtained and placed in CytoLyt.  Pressure was held.  There were no immediate complications.        Studies: Mammograms, ultrasound are reviewed.    Impression: Right breast mass. Discussed with her that although this has a very benign look on ultrasound and feels fairly benign, her age and the fact that it has grown so quickly are very concerning.  I think before running to do an excisional biopsy, we need to wait for the results of this FNA.  I am quite concerned that this could actually be a triple negative breast cancer as they can look very benign on ultrasound.  I did not go into details about how we would treat that at this point.  We just need to get the FNA back and then make a decision about further treatment after that.    Plan: Await the results of the FNA and then further treatment plans will be made.

## 2021-06-11 NOTE — PROGRESS NOTES
"Internal Medicine Office Visit  [facility]   Patient Name: Jes Mistry  Patient Age: 45 y.o.  YOB: 1975  MRN: [id number]    Date of Visit: 2020  Reason for Office Visit:   Chief Complaint   Patient presents with     Medication Management     BP medication           Assessment / Plan / Medical Decision Makin. Essential hypertension  - CONTINUE: lisinopriL-hydrochlorothiazide (PRINZIDE,ZESTORETIC) 10-12.5 mg per tablet; Take 1 tablet by mouth daily.  Dispense: 90 tablet; Refill: 1  - Return in 3 months with PCP for BP check and BMP lab test       Health Maintenance Review  Health Maintenance   Topic Date Due     HIV SCREENING  1990     TD 18+ HE  1993     TDAP ADULT ONE TIME DOSE  1993     ADVANCE CARE PLANNING  1993     PAP SMEAR  1996     PREVENTIVE CARE VISIT  2017     INFLUENZA VACCINE RULE BASED (1) 2021 (Originally 2020)     MAMMOGRAM  2021     LIPID  2025     HEPATITIS B VACCINES  Aged Out         I am having Alice Mistry maintain her diphenhydrAMINE and lisinopriL-hydrochlorothiazide.      HPI:  Jes Mistry is a 45 y.o. year old who presents to the office today for follow up of HTN. When she was seen last her BP was good so she was advised not to start the medication again at that time given her blood pressure was adequate without medication at that time. About 1 month ago she had a GYN appointment for a breast lump with a systolic reading of 150. About 2 weeks ago she felt a \"wooshing feeling\" without the medications and another systolic reading of 140. She had a reading in April of 147/86. She had lisinopril-HCT available so she restarted the medication 2 weeks ago.  She denies any lightheadedness or dizziness associated with the medication.    Review of Systems- pertinent positive in bold:  Respiratory: shortness of breath  Cardiovascular: chest pain, palpitations       Current " Scheduled Meds:  Outpatient Encounter Medications as of 2020   Medication Sig Dispense Refill     diphenhydrAMINE (BENADRYL) 25 mg capsule Take 25 mg by mouth every 6 (six) hours as needed for itching.       lisinopriL-hydrochlorothiazide (PRINZIDE,ZESTORETIC) 10-12.5 mg per tablet Take 1 tablet by mouth daily. 90 tablet 1     [DISCONTINUED] lisinopril-hydrochlorothiazide (PRINZIDE,ZESTORETIC) 10-12.5 mg per tablet Take 1 tablet by mouth daily. 30 tablet 0     No facility-administered encounter medications on file as of 2020.          Past Medical History:   Diagnosis Date     HTN (hypertension)      Nonsmoker      Thyroid nodule      Past Surgical History:   Procedure Laterality Date      SECTION       Social History     Tobacco Use     Smoking status: Never Smoker     Smokeless tobacco: Never Used   Substance Use Topics     Alcohol use: No     Drug use: No       Objective / Physical Examination:  Vitals:    20 1247   BP: 112/80   Pulse: 74   Weight: 130 lb (59 kg)     Wt Readings from Last 3 Encounters:   20 130 lb (59 kg)   20 104 lb (47.2 kg)   18 129 lb 4.8 oz (58.7 kg)     Body mass index is 22.67 kg/m .       Respiratory: Clear to auscultation bilaterally. Normal inspiratory and expiratory effort  Cardiovascular: Regular rate and rhythm. No murmurs, rubs, or gallops. No edema. No carotid bruits.       No orders of the defined types were placed in this encounter.  Followup: Return in about 3 months (around 2020) for Recheck with PCP, Cindy Michael. earlier if needed.          Elinor Carroll, CNP

## 2021-06-11 NOTE — TELEPHONE ENCOUNTER
Medication Request  Medication name:    Disp  Refills  Start  End  JOE    lisinopril-hydrochlorothiazide (PRINZIDE,ZESTORETIC) 10-12.5 mg per tablet  30 tablet  0  1/17/2020   No    Sig - Route: Take 1 tablet by mouth daily. - Oral    Sent to pharmacy as: lisinopril 10 mg-hydrochlorothiazide 12.5 mg tablet (PRINZIDE,ZESTORETIC)    Notes to Pharmacy: NO FURTHER refills without a visit.    E-Prescribing Status: Receipt confirmed by pharmacy (1/17/2020  9:36 AM CST)        [facility]  Reason for request: Caller stated that she has been feeling very off and had checked her blood pressure a while ago and it was reading high and since she still had some medication left she decided  to start taking them again and it seemed to have helped a lot. Caller is now wondering if she is needing to see Cindy Michael CNP for the medication since the last time was spoken about getting her on a different kind.   When did you use medication last?:    Patient offered appointment:  patient declined  Okay to leave a detailed message: yes

## 2021-06-11 NOTE — TELEPHONE ENCOUNTER
Patient returning call.  Needs to schedule in clinic visit to discuss BP medication restart.    Marina Culver RN  Triage Nurse Advisor

## 2021-06-11 NOTE — TELEPHONE ENCOUNTER
Called patient to further discuss symptoms and need to be seen in clinic for further evaluation and treatment.  Left message for patient to call clinic back at her earliest convenience, speak with the Triage RN and schedule an appointment.

## 2021-06-11 NOTE — TELEPHONE ENCOUNTER
New Appointment Needed  What is the reason for the visit:    Same Date/Next Day Appt Request  What is the reason for your visit?: BP check and medication.  Provider would like to see her this week and patient asked if it would be possible to schedule on Tuesday 9/29/20 close to the other appt she has on that same day.  Please advise patient    Provider Preference: PCP only  How soon do you need to be seen?: tomorrow  Waitlist offered?: No  Okay to leave a detailed message:  Yes

## 2021-06-11 NOTE — PROGRESS NOTES
Per Radiologist request, made patient an appointment to see Dr. Will for surgical consult on 9-29-20 at 0900.

## 2021-06-11 NOTE — PROGRESS NOTES
"Jes presents to [facility] today for a surgical consult with Dr. Will  regarding a right breast mass.  A needle biopsy was not done as it appears to be benign and patient decided she would like it removed anyway.  She found it herself about a month ago and feels it has grown in that amount of time. RN assessment and EMR update. Resp 16   Ht 5' 3.5\" (1.613 m)   Wt 130 lb (59 kg)   Breastfeeding No   BMI 22.67 kg/m     Patient met with Dr. Will .  See dictation for details of visit. FNA Cytology performed by Dr. Will and brought to lab.  Follow up pending biopsy results.  RN time 15 mins.  "

## 2021-06-12 NOTE — TELEPHONE ENCOUNTER
"Records in Epic / images in Nil.    Epic Notes  9/29/2020 - Progress Notes / Consult & Breast biopsy, Dr. Will.  9/28/2020 - Progress Notes / Office visit, Elinor Carroll.  1/23/2020 - Progress Notes / Office visit, Cindy Michael, CNP.    Labs  9/29/2020 - Medical Cytology (\"In process as of 10/2/20)    Imaging  9/16/2020 - Mammo diagnostic bilateral & US breast bilateral.      "

## 2021-06-12 NOTE — PROGRESS NOTES
Patient stopped in today after her Covid testing.  Sat with her for a bit, gave her a breast cancer packet with information specific to being Newly Diagnosed as well as triple negative breast cancers.  Reviewed support resources with her, including [employer] [facility] as a resource for her 10 year old son.  She was thankful for the written information so she could share it with her  who has been having a difficult time absorbing all she is going through with her new diagnosis.  She shared with me that they just moved into a new home and are working to get settled with that.  Support and encouragement provided, invited calls.    Patient called back this afternoon.  Had questions about the chemo meds that were sent to her pharmacy.  I gave her the number to call Dr. Olsen' triage RN to check with her about when she should start taking these meds.  Support provided, invited calls.

## 2021-06-12 NOTE — TELEPHONE ENCOUNTER
Telephoned and spoke with Alice returning her call from earlier today.  She has been feeling well after chemo and is encouraged by that.  She has started her period and writer reassured her, although menses sometimes stop during treatment, she should not be concerned by this.      She received wi resources and we talked about options.  Alice has a strong ally in her mother-in-law who has alopecia.      Writer also has left [facility] for Alice's son in clinic to  at her next visit Molly Tello RN

## 2021-06-12 NOTE — ANESTHESIA CARE TRANSFER NOTE
Last vitals:   Vitals:    10/08/20 1140   BP: 104/58   Pulse: 65   Resp: 16   Temp: 36.6  C (97.8  F)   SpO2: 100%     Patient's level of consciousness is drowsy  Spontaneous respirations: yes  Maintains airway independently: yes  Dentition unchanged: yes  Oropharynx: oropharynx clear of all foreign objects    QCDR Measures:  ASA# 20 - Surgical Safety Checklist: WHO surgical safety checklist completed prior to induction    PQRS# 430 - Adult PONV Prevention: 4558F - Pt received => 2 anti-emetic agents (different classes) preop & intraop  ASA# 8 - Peds PONV Prevention: NA - Not pediatric patient, not GA or 2 or more risk factors NOT present  PQRS# 424 - Dolores-op Temp Management: 4559F - At least one body temp DOCUMENTED => 35.5C or 95.9F within required timeframe  PQRS# 426 - PACU Transfer Protocol: - Transfer of care checklist used  ASA# 14 - Acute Post-op Pain: ASA14B - Patient did NOT experience pain >= 7 out of 10

## 2021-06-12 NOTE — TELEPHONE ENCOUNTER
Per Dr. Will's request, scheduled patient for a PET as was the recomnedation on her recent CT CAP.  Scheduled her for 10-20-20 at 1030 at [facility]. Message sent to Dr. Olsen as well as his RN's as patient is supposed to start her chemo 10-9-20.  LMOM for patient with this information and that message was left with Dr. Olsen about her chemo plan tomorrow.

## 2021-06-12 NOTE — CONSULTS
[facility] Hematology and Oncology Consult Note    Patient: Jes Mistry  MRN: [id number]  Date of Service: 10/05/2020      Reason for Visit:    1.  Breast cancer    Assessment/Plan:    1.  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  Reviewed the pathologic findings with the patient.  Discussed with her that we would typically give neoadjuvant chemotherapy in this setting.  Would recommend Adriamycin and Cytoxan followed by Taxol.  Rationale for this regimen was reviewed with the patient.  We talked about some of the more common side effects of the regimen including hair loss, cytopenias, nausea, peripheral neuropathy, fatigue, and the low risk of cardiovascular effects.  She was given written information on the drug to take home and review.  We discussed Port-A-Cath placement and will get one placed prior to starting therapy.  We will also get a baseline echocardiogram.  She will be referred to genetic counseling.  Would also recommend getting an ultrasound of the axilla as I do not think this was evaluated.  Finally, I am going to get a CT of the chest, abdomen, and pelvis for staging purposes.  Multiple questions were answered.  Total time spent with the patient was 70 minutes.  Greater than 50% of the time was spent on counseling and coordinating of care.    ECOG Performance   ECOG Performance Status: 0    Distress Assessment  Distress Assessment Score: Extreme distress    Problem List:    1. Malignant neoplasm of lower-inner quadrant of right female breast, unspecified estrogen receptor status (H)  CT Chest Abdomen Pelvis Without Oral With IV Contrast    IR Port Placement 5+ Years    Echo Complete    US Axilla Non Vascular Right     Staging History:    Cancer Staging  No matching staging information was found for the patient.    History:    Alice is a 45-year-old woman with a recent diagnosis of breast cancer.  She initially palpated a lump in her breast in August.  While it was  being evaluated she thought that it was getting larger.  She underwent diagnostic mammogram on September 16, 2020 which showed extremely dense breast tissue and a mass in the right breast at the 6 o'clock position.  Ultrasound was performed at the same time which showed a circumscribed mass measuring 24 x 13 x 19 mm.  She underwent a needle biopsy on September 29.  This showed a high-grade poorly differentiated carcinoma.  Estrogen receptor staining and 1% of cells.  ME negative and HER-2/emily negative.  She comes in today to discuss therapy.  Having anxiety over the diagnosis and treatment plan.  No significant pain.  Occasional warm flashes.    Past History:    Past Medical History:   Diagnosis Date     HTN (hypertension)      Nonsmoker      Thyroid nodule 2011    Family History   Problem Relation Age of Onset     Hypertension Mother      Hypertension Father      Cancer Maternal Grandmother 70        unknown-abdominal?     Cancer Maternal Grandfather 70        liver?      [unfilled] Social History     Socioeconomic History     Marital status:      Spouse name: Not on file     Number of children: 1     Years of education: Not on file     Highest education level: Not on file   Occupational History     Occupation:    Social Needs     Financial resource strain: Not on file     Food insecurity     Worry: Not on file     Inability: Not on file     Transportation needs     Medical: Not on file     Non-medical: Not on file   Tobacco Use     Smoking status: Never Smoker     Smokeless tobacco: Never Used   Substance and Sexual Activity     Alcohol use: No     Drug use: No     Sexual activity: Not on file   Lifestyle     Physical activity     Days per week: Not on file     Minutes per session: Not on file     Stress: Not on file   Relationships     Social connections     Talks on phone: Not on file     Gets together: Not on file     Attends Synagogue service: Not on file     Active member of club or  organization: Not on file     Attends meetings of clubs or organizations: Not on file     Relationship status: Not on file     Intimate partner violence     Fear of current or ex partner: Not on file     Emotionally abused: Not on file     Physically abused: Not on file     Forced sexual activity: Not on file   Other Topics Concern     Not on file   Social History Narrative     Not on file        Allergies:    Allergies   Allergen Reactions     Sulfa (Sulfonamide Antibiotics)      Review of Systems:    General  General (WDL): Exceptions to WDL  Fatigue: Yes - Chronic (Greater than 3 months)  ENT  ENT (WDL): Exceptions to WDL  Changes in vision: Yes - Chronic (Greater than 3 months)  Tinnitus: Yes - Chronic (Greater than 3 months)  Respiratory  Respiratory (WDL): All respiratory elements are within defined limits  Cardiovascular  Cardiovascular (WDL): Exceptions to WDL  Lightheadedness: Yes - Chronic (Greater than 3 months)  Endocrine  Endocrine (WDL): Exceptions to WDL  Hotflashes: Yes -Recent (Less than 3 months)  Gastrointestinal  Gastrointestinal (WDL): Exceptions to WDL  Abdominal Pain: Yes - Recent (Less than 3 months)  Diarrhea: Yes - Recent (Less than 3 months)  Poor Appetite: Yes- Recent (Less than 3 months)  Musculoskeletal  Musculoskeletal (WDL): All musculoskeletal elements are within defined limits  Neurological  Neurological (WDL): Exceptions to WDL  Dominant Hand: Right  Numbness and/or tingling: Yes - Chronic (Greater than 3 months)  Psychological/Emotional  Psychological/Emotional (WDL): Exceptions to WDL  Daytime sleepiness: Yes - Chronic (Greater than 3 months)  Hematological/Lymphatic  Hematological/Lymphatic (WDL): All hematological/lymphatic elements are within defined limits  Dermatological  Dermatologic (WDL): All dermatological elements are within defined limits  Genitourinary/Reproductive  Genitourinary/Reproductive (WDL): Exceptions to WDL  Sensation of incomplete emptying of bladder: Yes -  "Recent (Less than 3 months)  Reproductive (Females only)  Menstrual irritation or increase in discharge: No  Age at start of periods: 11  Last menstural cycle: 9/26/20  Number of pregnancies: 1  Age at first pregnancy: [age]  Patient Pregnant?: No  Is the patient trying to get pregnant?: No  Is the patient on birth control: No  Pain  Currently in Pain: No/denies    Physical Exam:    Recent Vitals 10/5/2020   Height 5' 3\"   Weight 128 lbs   BSA (m2) 1.61 m2   /60   Pulse 49   Temp 98.1   Temp src 1   SpO2 100   Some recent data might be hidden     General: patient appears stated age of 45 y.o.. Nontoxic and in no distress.   HEENT: Head: atraumatic, normocephalic. Sclerae anicteric.  Chest:  Normal respiratory effort  Cardiac:  No edema.   Abdomen: abdomen is non-distended  Extremities: normal tone and muscle bulk.  Skin: no lesions or rash. Warm and dry.   CNS: alert and oriented. Grossly non-focal.   Psychiatric: normal mood and affect.     Lab Results:    Recent Results (from the past 168 hour(s))   Medical Cytology   Result Value Ref Range    Case Report       Medical Cytology                                  Case: [id number]                                   Authorizing Provider:  Kimberly Will MD        Collected:           09/29/2020 0937              Ordering Location:     [facility]   Received:            09/29/2020 1048                                     [facility]                                                             Pathologist:           Darling Wilson MD                                                        Specimen:    Breast, Right Lower Inner                                                                  Final Diagnosis       BREAST, RIGHT LOWER INNER, MASS, CLINIC PERFORMED FNA, CYTOLOGY:     - HIGH GRADE POORLY DIFFERENTIATED CARCINOMA, COMPATIBLE WITH BREAST PRIMARY     - ESTROGEN RECEPTOR: POSITIVE, WEAK STAINING IN 1% OF CARCINOMA     - PROGESTERONE " RECEPTOR: NEGATIVE     - HER2/STEFFI: NEGATIVE (SCORE 1+)     - SEE MICROSCOPIC DESCRIPTION    MCRS    Comment       Slides were reviewed by Dr. Elio Alonzo and he concurs with final diagnosis.    The specimen was originally collected in the clinic on 9/29/20 at 0915 into CytoRich Red and placed into formalin on 9/30/20 at 1048. Total formalin fixation time:  11 hours 12 minutes.    ER/MD/HER2 immunostains are reviewed by Dr. David Lynn and he concurs with their interpretation.    Microscopic Description       The cell block demonstrates a high grade, markedly atypical malignant neoplasm characterized by epithelioid cells with moderate amounts of cytoplasm and increased N/C ratios with prominent nucleoli and scattered cells with multinucleation. Frequent mitotic figures are present and there are scattered bizarre nuclei.  Given the poorly differentiated appearance, GATA3 and MelanA immunohistochemical stains are performed with appropriate positive controls to further support a primary site. GATA3 (moderate) is positive and MelanA is negative in the neoplastic proliferation. These findings support the final diagnosis.    All controls stain appropriately.    Clinical Information Growing mass     Specimen Description       30 ml CytoRich Red.     1 SurePath slide     1 Cell block    Specimen Processing      Special Stains       Note - Estrogen and Progesterone Receptor Immunoperoxidase Stains:  These stains were done using the following criteria:    Specimen fixative: Formalin-fixed paraffin-embedded sections    Detection system: Biotin-free multimer-based technology detection system ([employer])    Retrieval method: CC1 pretreatment; a sp-based buffer with a slightly basic PH used at an elevated                                       temperature (95+5 degrees C)    Clone:  Estrogen receptor-SP1, Rabbit monoclonal ([place])     Progesterone receptor-1E2, Rabbit monoclonal ([place])        Scoring method (CAP/ASCO  guidelines):                                       Intensity of nuclear stain: strong vs weak vs absent                                       Percentage of cells stained:                                         Indeterminate (Internal control cells present; no immunoreactivity of either                                         tumor cells or internal controls)                                          Negative (Percentage of cells with nuclear positivity is less than 1%)                                         Positive (Percentage of cells with nuclear positivity is equal to or greater than 1%)    REFERENCES:  1) Gigi ME, Praneeth DF, Robb M, et al. American Society of Clinical Oncology/College of American Pathologists guideline recommendations for immunohistochemical testing of estrogen and progesterone receptors in breast cancer. Arch Pathol Lab Med. 2010;134(6):907-922    Note - HER-2/emily Immunoperoxidase Stain:  This stain was done using the following criteria:    Specimen fixative: Formalin-fixed paraffin-embedded sections    Detection system: Biotin-free multimer-based technology detection system ([employer])    Retrieval method: CC1 pretreatment; a sp-based buffer with a slightly basic PH used at an elevated     temperature (95 plus or minus 5 degrees C)    Clone:  4B5, Rabbit monoclonal ([place] [facility])    Scoring method: IHC 3+ - Positive (Circumferential membrane  staining that is complete, intense, and     within greater than 10% of tumor cells)       IHC 2+ - Equivocal (Circumferential Membrane staining that is incomplete and/or     weak/moderate and within greater than 10% of tumor cells or complete and     circumferential membrane staining that is intense and less than or equal to 10% of tumor     cells)       IHC1+ - Negative (Incomplete membrane staining that is faint/barely perceptible     and within greater than 10% of tumor cells       IHC 0 - Negative (No staining is Observed or Membrane  staining that is incomplete and     is faint/barely perceptible and within less than or equal to 10% of tumor cells)    REFERENCES:  1) Darvin MARX et al: Recommendations for Human Epidermal Growth Factor Receptor 2 Testing in Breast Cancer.      American Society of Clinical Oncology/College of American Pathologists Clinical Practice Guideline Update.      Arch Pathol Lab Med. 2014;138: 241-256     * HER-2/emily stain performed using the [facility]'s  FDA approved methodology.    Charges       CPT: 58480, 34779, 78723, 12207, 88360 x3  ICD-10: C50.311    General Path Interpretation Positive for malignant cells (!) Negative for malignant cells, Non-Diagnostic       Imaging Results:    Mammo Diagnostic Bilateral    Result Date: 9/16/2020  EXAM: MAMMO DIAGNOSTIC 3D BILATERAL, US BREAST BILATERAL LIMITED 1-3 QUADRANTS LOCATION: [facility] OUTPATIENT SERVICES DATE/TIME: 09/16/2020, 1:03 PM INDICATION: Lump in the right breast in the 6:00 position. COMPARISON: None. MAMMOGRAPHIC FINDINGS: Bilateral full-field digital diagnostic mammograms performed. The breasts are extremely dense, which lowers the sensitivity of mammography. Images evaluated with the assistance of CAD. Breast tomosynthesis was used in interpretation. Scattered benign-appearing round calcifications are seen in both breasts. In the right breast, in the 6:00 position, under the BB, there is a mass with some circumscribed and some obscured margins. On the left MLO view, there is prominence of the tissue in the upper outer quadrant. ULTRASOUND FINDINGS: Targeted bilateral breast ultrasound was performed by both the ultrasonographer and the radiologist. The palpable lump in the 6:00 position in the right breast and the upper outer quadrant of the left breast were examined. In the right breast in the 6:00 position 2 cm from the nipple, there was a hypoechoic circumscribed mass which was wider than it was tall measuring 2.4 x 1.3 x 1.9 cm correlating  with the mammographic abnormality and the palpable mass. The appearance is most consistent with a benign fibroadenoma. However, ultrasound-guided biopsy versus excision was recommended to confirm this. In the left breast, a few tiny benign cysts were seen. The largest was in the 1:00 position 1 cm from the nipple measuring 4 mm in greatest dimension. There were no findings to suggest malignancy.     1.  Mass in the right breast correlates with the palpable and mammographic abnormalities. While this most likely represents a benign fibroadenoma, biopsy is recommended to confirm this. Alternatives of ultrasound-guided needle biopsy versus surgical excision were discussed with the patient. She would prefer to have the lesion removed. Surgical consultation with Dr. Will on 09/29/2020 was scheduled. ACR BI-RADS Category 4A: Suspicious. Low Suspicion for Malignancy. Surgical consultation with Dr. Will on 09/29/2020 has been scheduled for the patient.     Us Breast Bilateral Limited 1-3 Quadrants    Result Date: 9/17/2020  EXAM: MAMMO DIAGNOSTIC 3D BILATERAL, US BREAST BILATERAL LIMITED 1-3 QUADRANTS LOCATION: [facility] OUTPATIENT SERVICES DATE/TIME: 09/16/2020, 1:03 PM INDICATION: Lump in the right breast in the 6:00 position. COMPARISON: None. MAMMOGRAPHIC FINDINGS: Bilateral full-field digital diagnostic mammograms performed. The breasts are extremely dense, which lowers the sensitivity of mammography. Images evaluated with the assistance of CAD. Breast tomosynthesis was used in interpretation. Scattered benign-appearing round calcifications are seen in both breasts. In the right breast, in the 6:00 position, under the BB, there is a mass with some circumscribed and some obscured margins. On the left MLO view, there is prominence of the tissue in the upper outer quadrant. ULTRASOUND FINDINGS: Targeted bilateral breast ultrasound was performed by both the ultrasonographer and the radiologist. The palpable lump  in the 6:00 position in the right breast and the upper outer quadrant of the left breast were examined. In the right breast in the 6:00 position 2 cm from the nipple, there was a hypoechoic circumscribed mass which was wider than it was tall measuring 2.4 x 1.3 x 1.9 cm correlating with the mammographic abnormality and the palpable mass. The appearance is most consistent with a benign fibroadenoma. However, ultrasound-guided biopsy versus excision was recommended to confirm this. In the left breast, a few tiny benign cysts were seen. The largest was in the 1:00 position 1 cm from the nipple measuring 4 mm in greatest dimension. There were no findings to suggest malignancy.     1.  Mass in the right breast correlates with the palpable and mammographic abnormalities. While this most likely represents a benign fibroadenoma, biopsy is recommended to confirm this. Alternatives of ultrasound-guided needle biopsy versus surgical excision were discussed with the patient. She would prefer to have the lesion removed. Surgical consultation with Dr. Will on 09/29/2020 was scheduled. ACR BI-RADS Category 4A: Suspicious. Low Suspicion for Malignancy. Surgical consultation with Dr. Will on 09/29/2020 has been scheduled for the patient.       Signed by: Elia Olsen MD

## 2021-06-12 NOTE — TELEPHONE ENCOUNTER
Patient called back in today after receiving a call to see how she was doing after her first chemo.  Patient was appreciative of the call and states that she is doing well.    Georgie Mejia RN

## 2021-06-12 NOTE — PROGRESS NOTES
Patient is here for labs, provider, and treatment for Malignant neoplasm of lower-inner quadrant of right female breast, unspecified estrogen receptor status (H).    Detail Level: Detailed Detail Level: Simple Detail Level: Zone

## 2021-06-12 NOTE — PROGRESS NOTES
Jes Mistry, 45 y.o., female arrived ambulatory to clinic at 1045 for Cycle #3 Day #1 of her chemotherapy regimen. Port was accessed using aseptic technique without difficulties with excellent blood return. Labs drawn and reviewed. Administered premedications and chemotherapy per provider order. She tolerated infusions well, no s/s of infusion reactions. Port was flushed with NS and Heparin then de-accessed using 2x2 and papertape. Jes ARAUJO Fede verbalized understanding of plan of care and return to clinic. Discharged to [facility] at 1435 alert and ambulatory.

## 2021-06-12 NOTE — PROGRESS NOTES
[facility] Hematology and Oncology Progress Note    Patient: Jes Mistry  MRN: [id number]  Date of Service: 10/22/2020        Reason for Visit    Chief Complaint   Patient presents with     HE Cancer       Assessment and Plan  Cancer Staging  Malignant neoplasm of lower-inner quadrant of right breast of female, estrogen receptor negative (H)  Staging form: Breast, AJCC 8th Edition  - Clinical: Stage IIB (cT2, cN0, cM0, G3, ER-, MA-, HER2-) - Signed by Kaela Plunkett CNP on 10/9/2020    1. Breast cancer, stage IIB: pt with triple negative disease. Has started neoadjuvant chemo with dose dense AC. Did pretty well with the first cycle. She will get cycle 2 today. Return in 2 weeks for cycle 3.     2.  7 mm pulmonary nodule: This does not have any activity on PET scan so likely benign.  Radiology did recommend a follow-up so we will likely repeat a CT scan in 6 months.    3. Mild fatigue, nausea, heartburn: from chemo. Expected side effects. tums and zofran worked well. Continue those. Encourage good diet and exercise daily.    4. Microcytic anemia: could be iron deficient. Recently stopped eating meat. We will check ferritin today and if low, may have her take iron supplement.     ECOG Performance   ECOG Performance Status: 0     Distress Assessment  Distress Assessment Score: 5: She declined any further assistance at this time. Has good support. Working with navigator and getting some resources for son through [employer].       Pain  Currently in Pain: No/denies      Problem List    1. Malignant neoplasm of lower-inner quadrant of right female breast, unspecified estrogen receptor status (H)  ondansetron (ZOFRAN) 4 MG tablet    CC OFFICE VISIT LONG   2. Chemotherapy-induced neutropenia (H)  ondansetron (ZOFRAN) 4 MG tablet    CC OFFICE VISIT LONG      ______________________________________________________________________________    History of Present Illness    DIAGNOSIS: breast cancer, right  breast. Palpable lump. 24mm. High grade, poorly differentiated carcinoma. ER1%+, NH-HER2/emily- so essentially triple negative. T2N0M0, Stage IIB    TREATMENT: dose dense AC in neoadjuvant fashion.  Today is cycle 2.     INTERIM HISTORY: pt is here today to continue chemo. She did quite well and states she is excited because her tumor is shrinking. Had heartburn and nausea for about 5 days after treatment. meds helped. Appetite pretty good.     Pain Status  Currently in Pain: No/denies    Review of Systems    Constitutional  Constitutional (WDL): All constitutional elements are within defined limits  Neurosensory  Neurosensory (WDL): All neurosensory elements are within defined limits  Eye   Eye Disorder (WDL): All eye disorder elements are within defined limits  Ear  Ear Disorder (WDL): All ear disorder elements are within defined limits  Cardiovascular  Cardiovascular (WDL): All cardiovascular elements are within defined limits  Pulmonary  Respiratory (WDL): Within Defined Limits  Gastrointestinal  Gastrointestinal (WDL): Exceptions to WDL  Anorexia: Loss of appetite without alteration in eating habits  Constipation: Occasional or intermittent symptoms, occasional use of stool softeners, laxatives, dietary modification, or enema  Esophagitis: Asymptomatic, clinical or diagnostic observations only, intervention not indicated  Nausea: Loss of appetite without alteration in eating habits  Genitourinary  Genitourinary (WDL): All genitourinary elements are within defined limits  Lymphatic  Lymph (WDL): All lymph disorder elements are within defined limits  Musculoskeletal and Connective Tissue  Musculoskeletal and Connetive Tissue Disorders (WDL): All Musculoskeletal and Connetive Tissue Disorder elements are within defined limits  Integumentary  Integumentary (WDL): Exceptions to WDL  Alopecia: Hair loss of up to 50% of normal for that individual that is not obvious from a distance but only on close inspection, a  different hair style may be required to cover the hair loss but it does not require a wig or hair piece to camouflage  Urticaria: Urticarial lesions covering <10% BSA, topical intervention indicated  Patient Coping  Patient Coping: Accepting  Distress Assessment  Distress Assessment Score: 5  Accompanied by  Accompanied by: Alone  Oral Chemo Adherence         Past History  Past Medical History:   Diagnosis Date     History of anesthesia complications     Slow to wake up     HTN (hypertension)      Nonsmoker      Thyroid nodule 2011       PHYSICAL EXAM  /59   Pulse (!) 57   Temp 98.4  F (36.9  C) (Oral)   Wt 120 lb (54.4 kg)   LMP 09/26/2020   SpO2 100%   BMI 21.26 kg/m      GENERAL: no acute distress. Cooperative in conversation. Here alone due to visitor restrictions. Mask on  RESP: Regular respiratory rate. No expiratory wheezes   MUSCULOSKELETAL: no bilateral leg swelling  NEURO: non focal. Alert and oriented x3.   PSYCH: within normal limits. No depression or anxiety.  SKIN: exposed skin is dry intact.     Lab Results    Recent Results (from the past 168 hour(s))   Comprehensive Metabolic Panel   Result Value Ref Range    Sodium 141 136 - 145 mmol/L    Potassium 3.7 3.5 - 5.0 mmol/L    Chloride 104 98 - 107 mmol/L    CO2 27 22 - 31 mmol/L    Anion Gap, Calculation 10 5 - 18 mmol/L    Glucose 120 70 - 125 mg/dL    BUN 13 8 - 22 mg/dL    Creatinine 0.70 0.60 - 1.10 mg/dL    GFR MDRD Af Amer >60 >60 mL/min/1.73m2    GFR MDRD Non Af Amer >60 >60 mL/min/1.73m2    Bilirubin, Total 0.2 0.0 - 1.0 mg/dL    Calcium 8.8 8.5 - 10.5 mg/dL    Protein, Total 7.0 6.0 - 8.0 g/dL    Albumin 4.0 3.5 - 5.0 g/dL    Alkaline Phosphatase 71 45 - 120 U/L    AST 14 0 - 40 U/L    ALT 18 0 - 45 U/L   HM1 (CBC with Diff)   Result Value Ref Range    WBC 8.7 4.0 - 11.0 thou/uL    RBC 4.18 3.80 - 5.40 mill/uL    Hemoglobin 10.3 (L) 12.0 - 16.0 g/dL    Hematocrit 33.1 (L) 35.0 - 47.0 %    MCV 79 (L) 80 - 100 fL    MCH 24.6 (L) 27.0  - 34.0 pg    MCHC 31.1 (L) 32.0 - 36.0 g/dL    RDW 17.0 (H) 11.0 - 14.5 %    Platelets 179 140 - 440 thou/uL    MPV 10.3 8.5 - 12.5 fL   Manual Differential   Result Value Ref Range    Total Neutrophils % 83 (H) 50 - 70 %    Lymphocytes % 9 (L) 20 - 40 %    Monocytes % 6 2 - 10 %    Eosinophils %  0 0 - 6 %    Basophils % 0 0 - 2 %    Metamyelocytes % 3 (H) <=1 %    Immature Granulocyte % - Manual 3 (H) <=0 %    Total Neutrophils Absolute 7.2 2.0 - 7.7 thou/ul    Lymphocytes Absolute 0.7 (L) 0.8 - 4.4 thou/uL    Monocytes Absolute 0.5 0.0 - 0.9 thou/uL    Eosinophils Absolute 0.0 0.0 - 0.4 thou/uL    Basophils Absolute 0.0 0.0 - 0.2 thou/uL    Metamyelocytes Absolute 0.3 (H) <=0.1 thou/uL    Immature Granulocyte Absolute - Manual 0.3 (H) <=0.0 thou/uL    Platelet Estimate Normal Normal       Imaging    Echo Complete    Result Date: 10/6/2020  1. Normal left ventricular size and systolic performance with a visually estimated ejection fraction of 60-65%. 2. No significant valvular heart disease is identified on this study. 3. Normal right ventricular size and systolic performance.     Us Breast Clip Placement Right    Result Date: 10/8/2020  EXAM: US BREAST CLIP PLACEMENT RIGHT, MAMMO POST CLIP PLACEMENT RIGHT LOCATION: [facility]  [facility] DATE/TIME: 10/8/2020 8:53 AM INDICATION: Placement of tissue marker clip in right breast cancer at 6 o'clock position 2 cm from the nipple. PROCEDURE: Informed consent was obtained from the patient. A time out was performed where the proper patient, procedure, and site were confirmed. The skin was prepped and draped and 1% lidocaine used for local anesthesia. Under direct sonographic guidance, a Hydro marked marker clip was advanced through the target and the clip deployed. Postprocedure mammograms demonstrate the clip in appropriate position. The patient tolerated this well.     Sonographically-guided breast-marker clip centrally in previously biopsied right  breast mass which demonstrated invasive ductal carcinoma.    Mammo Post Clip Placement Right    Result Date: 10/8/2020  EXAM: US BREAST CLIP PLACEMENT RIGHT, MAMMO POST CLIP PLACEMENT RIGHT LOCATION: [facility] DATE/TIME: 10/8/2020 8:53 AM INDICATION: Placement of tissue marker clip in right breast cancer at 6 o'clock position 2 cm from the nipple. PROCEDURE: Informed consent was obtained from the patient. A time out was performed where the proper patient, procedure, and site were confirmed. The skin was prepped and draped and 1% lidocaine used for local anesthesia. Under direct sonographic guidance, a Hydro marked marker clip was advanced through the target and the clip deployed. Postprocedure mammograms demonstrate the clip in appropriate position. The patient tolerated this well.     Sonographically-guided breast-marker clip centrally in previously biopsied right breast mass which demonstrated invasive ductal carcinoma.    Nm Pet Ct Skull To Mid Thigh    Result Date: 10/9/2020  EXAM: NM PET CT SKULL TO MID THIGH LOCATION: [facility] DATE/TIME: 10/9/2020 8:44 AM INDICATION: Breast cancer, right, staging. Malignant neoplasm of lower inner quadrant of right breast of female, ER negative. High-grade poorly differentiated carcinoma. Indeterminate pulmonary nodule on recent CT scan. Initial treatment strategy. COMPARISON: CT chest abdomen pelvis 10/06/2020 reviewed. TECHNIQUE: Serum glucose level 80 mg/dL. One hour post intravenous administration of 9.5 mCi F-18 FDG, PET imaging was performed from the skull base to the mid thighs utilizing attenuation correction with concurrent axial CT and PET/CT image fusion. Dose  reduction techniques were used. FINDINGS: FDG avid 2.6 cm mass in the lower inner right breast demonstrates marked uptake (SUVmax 7.6), consistent with biopsy-proven malignancy. Normal physiologic FDG uptake elsewhere. No evidence of FDG avid  metastasis. No FDG avid adenopathy in the right axilla or elsewhere. A circumscribed 0.7 cm pulmonary nodule in the left lower lobe, as depicted on CT 10/06/2020, demonstrates no FDG activity. No suspicious focal uptake in the liver or skeleton. Normal physiologic uptake in the uterine endometrium. Left subclavian Port-A-Cath with tip near the SVC/RA junction. Hypoattenuation of blood pool relative to myocardium suggesting anemia. Diffuse hepatic steatosis. Small left renal cyst, no follow-up needed. Calcified uterine fibroids. Mild degenerative changes cervical spine.     1. FDG avid right breast mass consistent with biopsy-proven malignancy. 2. No evidence of FDG avid edgard or distant metastasis. 3. An indeterminate 0.7 cm pulmonary nodule in the left lower lobe demonstrates no FDG activity, favoring a benign etiology, possibly hamartoma or granuloma. Chest CT imaging surveillance recommended.     Ct Chest Abdomen Pelvis Without Oral With Iv Contrast    Result Date: 10/6/2020  EXAM: CT CHEST ABDOMEN PELVIS WO ORAL W IV CONTRAST LOCATION: [facility] DATE/TIME: 10/6/2020 6:39 PM INDICATION: High-grade carcinoma right breast, evaluate for metastatic disease. COMPARISON: None. TECHNIQUE: CT scan of the chest, abdomen, and pelvis was performed following injection of IV contrast. Multiplanar reformats were obtained. Dose reduction techniques were used. CONTRAST: 100 mL Omnipaque 350. FINDINGS: LUNGS AND PLEURA: Noncalcified 6 x 7 mm pulmonary nodule left lower lobe on image #105 indeterminate. No other nodules. Mild fibroatelectasis. Mild air trapping often associated with small vessel or small airways disease. MEDIASTINUM/AXILLAE: No adenopathy by size criteria within the mediastinum, bilateral hilar areas, or axillary regions. HEPATOBILIARY: Two 3 mm and smaller lesions in left and right lobe of liver too small to characterize definitively. Gallbladder unremarkable. PANCREAS: Normal. SPLEEN:  Normal. ADRENAL GLANDS: Normal. KIDNEYS/BLADDER: Bilateral small renal cysts requiring no further evaluation, otherwise normal. BOWEL: Normal. LYMPH NODES: Normal. VASCULATURE: Retroaortic left renal vein. No aortic aneurysm. PELVIC ORGANS: Heterogeneous uterus with calcified and presumed noncalcified fibroids. Adnexa unremarkable. MUSCULOSKELETAL: 2.6 x 2.3 x 1.8 cm right breast mass consistent with patient's known malignancy. Degenerative disease. Presumed bone islands.     1.  Right breast mass consistent with patient's known malignancy. 2.  Indeterminate left lower lobe pulmonary nodule. PET/CT recommended for further evaluation. 3.  Tiny hepatic lesions too small to characterize definitively, prior could confirm these are benign. 4.  Heterogeneous uterus with presumed fibroids, though noncalcified lesions are technically indeterminate. 5.  Mild air trapping often associated with small vessel or small airways disease.    Us Axilla Non Vascular Right    Result Date: 10/6/2020  EXAM: US AXILLA NON VASCULAR RIGHT LOCATION: [facility] DATE/TIME: 10/6/2020 7:14 PM INDICATION: Malignant neoplasm of lower-inner quadrant of right female breast, evaluate for adenopathy COMPARISON: CT earlier today TECHNIQUE: Routine. FINDINGS: No pathologic adenopathy identified with normal fatty replaced lymph nodes noted.     1.  No pathologic adenopathy identified.          Signed by: Kaela Plunkett CNP

## 2021-06-12 NOTE — ANESTHESIA POSTPROCEDURE EVALUATION
Patient: Jes Mistry  Procedure(s):  Port Placement (Left)  Anesthesia type: MAC    Patient location: Phase II Recovery  Last vitals:   Vitals Value Taken Time   /66 10/08/20 1200   Temp  10/08/20 1213   Pulse 68 10/08/20 1210   Resp  10/08/20 1213   SpO2 96 % 10/08/20 1210   Vitals shown include unvalidated device data.  Post vital signs: stable  Level of consciousness: awake and responds to simple questions  Post-anesthesia pain: pain controlled  Post-anesthesia nausea and vomiting: no  Pulmonary: unassisted, return to baseline  Cardiovascular: stable and blood pressure at baseline  Hydration: adequate  Anesthetic events: no    QCDR Measures:  ASA# 11 - Dolores-op Cardiac Arrest: ASA11B - Patient did NOT experience unanticipated cardiac arrest  ASA# 12 - Dolores-op Mortality Rate: ASA12B - Patient did NOT die  ASA# 13 - PACU Re-Intubation Rate: NA - No ETT / LMA used for case  ASA# 10 - Composite Anes Safety: ASA10A - No serious adverse event    Additional Notes:

## 2021-06-12 NOTE — TELEPHONE ENCOUNTER
Telephoned and spoke with Alice to check in after her consult with Dr. Olsen yesterday.  She is in between the many medical appointments in preparation for the start of treatment for breast cancer.  She is also working from home today and trying to catch up, so we kept our conversation brief.      Dr. Olsen has recommended chemotherapy and Alice knows hair loss is expected with this treatment.  A wig resources folder will be mailed to her today.  The included letter also informs patient of Look Good Feel Better virtual workshops.    Alice has a 10-year old son in 5th grade.  She is appreciative of psycho-social resources provided by Susan Lombardi, RN at Dr. Will's office.  Alice's original understanding of [employer]) was only of the financial support, but we talk now about Facing Cancer Together and [employer]'s virtual programming, and [employer].  Alice and her spouse have discussed her diagnosis with her son.   Emotional support and encouragement provided.  Informed her about oncology psychotherapist available through our program as well.    Molly Tello RN

## 2021-06-12 NOTE — TELEPHONE ENCOUNTER
Called pt and left voicemail to follow up on how she is feeling after her second cycle of chemotherapy.  Instructed pt she does not need to call clinic if she is feeling fine.  Instructed pt to call clinic with any questions or concerns.

## 2021-06-12 NOTE — PROGRESS NOTES
Cycle 2, Day 1 -- [facility] at 09:58, seated in chair 5. Reviewed plan of care. Port a cath has an excellent blood return. Used NS as the primary IV solution. Administered anti-emetics as ordered -  and explained each medication as given. Administered adriamycin 100 mg IVP with a running IV of NS; and infused cyclophosphamide 950 mg over 30 minutes - no ill effects noted. At completion the port a cath was flushed, heparinized, and deaccessed - covered the site with folded 2x2s and paper tape. Attached the neulasta on-body injector to the right upper abdomen - patient has verbalized understanding of how this functions and the timing. Left the unit ambulatory in stable condition at 12:50. Instructed to call with any questions or concerns.     Ashlie Ledbetter RN

## 2021-06-12 NOTE — TELEPHONE ENCOUNTER
Alice called back to confirm she received my message. We r/s her Covid test to earlier today due to her schedule.    She also asked to cancel her office visit that was scheduled today with Dr. Will. She informed me that this appointment was only for her  to attend since he hadn't been involved in the other appointments due to covid. She stated that Dr. Will agreed to get them on the schedule to go over everything with him there but he is unable to make it to that appointment so Alice requested the cancel. She stated she left a voicemail for Melva last night also but since we were on the phone together I went ahead and canceled it for her.

## 2021-06-12 NOTE — TELEPHONE ENCOUNTER
Left a message for the patient regarding surgery for Port Placement to be done this Thursday 10/8 at [facility]. Covid Testing scheduled for today also at [facility] at 2pm. Asked pt to call me if there is any questions. My Chart message sent with Details surrounding surgery.

## 2021-06-12 NOTE — PROGRESS NOTES
[facility] Hematology and Oncology Progress Note    Patient: Jes Mistry  MRN: [id number]  Date of Service: 10/09/2020        Reason for Visit    Chief Complaint   Patient presents with     HE Cancer     Malignant neoplasm of lower-inner quadrant of right female breast, unspecified estrogen receptor status (H)       Assessment and Plan  Cancer Staging  Malignant neoplasm of lower-inner quadrant of right breast of female, estrogen receptor negative (H)  Staging form: Breast, AJCC 8th Edition  - Clinical: Stage IIB (cT2, cN0, cM0, G3, ER-, KS-, HER2-) - Signed by Kaela Plunkett, CNP on 10/9/2020    1. Breast cancer, stage IIB: pt with triple negative disease. She had a port placed yesterday, PET this am and is here today to start neoadjuvant chemo with dose dense AC. No signs of metastatic disease on PET. She will start today. We signed a consent today. They felt educated. They understands the risks and benefits of treatment.  they understand how to use the post medications for nausea.  they know the side effects include, but are not limited to: alopecia, diarrhea/constipation, nausea, vomiting, fatigue/weakness, myelosuppression, mucositis, peripheral neuropathy, taste alterations, poor appetite. They understand this is with curable intent. ECHO was normal. She will get neulasta with the AC portion. She will return in 2 weeks.     2.  7 mm pulmonary nodule: This does not have any activity on PET scan so likely benign.  Radiology did recommend a follow-up so we will likely repeat a CT scan in 6 months.    ECOG Performance   ECOG Performance Status: 0     Distress Assessment  Distress Assessment Score: 7: offered psych services for her and her 11yo son. Will think about it. anxiety related to diagnosis and the unknown side effect of treatment. She declined any further assistance at this time and thinks it will get better with chemo.       Pain  Currently in Pain: No/denies      Problem List    1.  Chemotherapy-induced neutropenia (H)  CC OFFICE VISIT LONG   2. Malignant neoplasm of lower-inner quadrant of right female breast, unspecified estrogen receptor status (H)  CC OFFICE VISIT LONG    Ambulatory referral to Genetics      ______________________________________________________________________________    History of Present Illness    DIAGNOSIS: breast cancer, right breast. Palpable lump. 24mm. High grade, poorly differentiated carcinoma. ER1%+, NE-HER2/emily- so essentially triple negative. T2N0M0, Stage IIB    TREATMENT: here to start dose dense AC in neoadjuvant fashion.     INTERIM HISTORY: pt is here today to start chemo.  She is feeling a little anxious about everything going on.  She is worried about side effects.  She is trying to work during this time so she is not quite sure how that is going to happen.  She did get her port placed yesterday and that still pretty tender but she said it is better today than it was yesterday.  She is icing it which makes it feel better.  She had a PET scan this morning and is a little anxious about those results.  Overall she just feels that she needs a little bit more information about what happening and things are going pretty fast.    Pain Status  Currently in Pain: No/denies    Review of Systems    Oncology Nurse Assessment/CMA Intake: Constitutional  Constitutional (WDL): All constitutional elements are within defined limits  Neurosensory  Neurosensory (WDL): All neurosensory elements are within defined limits  Eye   Eye Disorder (WDL): All eye disorder elements are within defined limits  Ear  Ear Disorder (WDL): All ear disorder elements are within defined limits  Cardiovascular  Cardiovascular (WDL): All cardiovascular elements are within defined limits  Pulmonary  Respiratory (WDL): Within Defined Limits  Gastrointestinal  Gastrointestinal (WDL): All gastrointestinal elements are within defined limits  Genitourinary  Genitourinary (WDL): All genitourinary  "elements are within defined limits  Lymphatic  Lymph (WDL): All lymph disorder elements are within defined limits  Musculoskeletal and Connective Tissue  Musculoskeletal and Connetive Tissue Disorders (WDL): All Musculoskeletal and Connetive Tissue Disorder elements are within defined limits  Integumentary  Integumentary (WDL): All integumentary elements are within defined limits  Patient Coping  Patient Coping: Accepting;Open/discussion  Accompanied by  Accompanied by: Alone  Oral Chemo Adherence         Past History  Past Medical History:   Diagnosis Date     History of anesthesia complications     Slow to wake up     HTN (hypertension)      Nonsmoker      Thyroid nodule 2011       PHYSICAL EXAM  /66   Pulse (!) 54   Temp 98  F (36.7  C) (Oral)   Ht 5' 3\" (1.6 m)   Wt 125 lb 3.2 oz (56.8 kg)   LMP 09/26/2020   SpO2 100%   BMI 22.18 kg/m      GENERAL: no acute distress. Cooperative in conversation. Here alone due to visitor restrictions. Mask on  RESP: Regular respiratory rate. No expiratory wheezes   MUSCULOSKELETAL: no bilateral leg swelling  NEURO: non focal. Alert and oriented x3.   PSYCH: within normal limits. No depression or anxiety.  SKIN: exposed skin is dry intact.     Lab Results    Recent Results (from the past 168 hour(s))   Echo Complete   Result Value Ref Range    LV volume diastolic 67 46 - 106 cm3    LV volume systolic 29 14 - 42 cm3    IVSd 0.78 0.6 - 0.9 cm    LVIDd 4.52 3.8 - 5.2 cm    LVIDs 2.81 2.2 - 3.5 cm    LVOT diam 1.9 cm    LVOT mean gradient 3 mmHg    LVOT peak VTI 21.6 cm    LVOT mean radha 85.5 cm/s    LVOT peak radha 110 cm/s    LVOT peak gradient 5 mmHg    LV PWd 0.734 0.6 - 0.9 cm    MV E' lat radha 10 cm/s    MV E' med radha 9.16 cm/s    AV mean radha 93.7 cm/s    AV mean gradient 4 mmHg    AV VTI 25 cm    AV peak radha 127 cm/s    AO root 3 cm    AO ascending 2.5 cm    LA size 2.9 cm    MV decel time 162 ms    MV peak A radha 60.7 cm/s    MV peak E radha 71.6 cm/s    LA area 1 12.8 " cm2    LA length 4.25 cm    LA area 2 12.8 cm2    TAPSE 1.9 cm    BSA 1.6 m2    Hieght 63 in    Weight 2,048 lbs    /60 mmHg    IVS/PW ratio 1.1     LV FS 37.8 28 - 44 %    Echo LVEF calculated 57 55 - 75 %    LA volume 32.8 mL    LV mass 106.6 g    AV area 2.4 cm2    AV DIM IND radha 0.9     MV E/A Ratio 1.2     LVOT area 2.83 cm2    LVOT SV 61.2 cm3    AV peak gradient 6.5 mmHg    LV systolic volume index 18.1 8 - 24 cm3/m2    LV diastolic volume index 41.9 29 - 61 cm3/m2    LA volume index 20.5 mL/m2    LV mass index 66.6 g/m2    LV SVi 38.3 ml/m2    MV med E/e' ratio 7.8     MV lat E/e' ratio 7.2     Height 63.0 in    Weight 128 lbs    MV Avg E/e' Ratio 7.5 cm/s    AV DIM IND VTI 0.9    COVID-19 Virus PCR MRF    Specimen: Respiratory   Result Value Ref Range    COVID-19 VIRUS SPECIMEN SOURCE Nasopharyngeal     2019-nCOV Not Detected    POCT Pregnancy (Beta-hCG, Qual), Urine (Point of Care) on DOS   Result Value Ref Range    POC Preg, Urine Negative Negative    POCT Kit Lot Number [id number]     POCT Kit Expiration Date 1/22     Pos Control Valid Control Valid Control    Neg Control Valid Control Valid Control    Dipstick Lot Number      Dipstick Expiration Date      POC Specific Gravity, Urine     POCT Glucose    Specimen: Capillary; Blood   Result Value Ref Range    Glucose 80 70 - 139 mg/dL   Comprehensive Metabolic Panel   Result Value Ref Range    Sodium 138 136 - 145 mmol/L    Potassium 3.7 3.5 - 5.0 mmol/L    Chloride 104 98 - 107 mmol/L    CO2 26 22 - 31 mmol/L    Anion Gap, Calculation 8 5 - 18 mmol/L    Glucose 93 70 - 125 mg/dL    BUN 16 8 - 22 mg/dL    Creatinine 0.78 0.60 - 1.10 mg/dL    GFR MDRD Af Amer >60 >60 mL/min/1.73m2    GFR MDRD Non Af Amer >60 >60 mL/min/1.73m2    Bilirubin, Total 0.4 0.0 - 1.0 mg/dL    Calcium 9.2 8.5 - 10.5 mg/dL    Protein, Total 7.5 6.0 - 8.0 g/dL    Albumin 4.1 3.5 - 5.0 g/dL    Alkaline Phosphatase 48 45 - 120 U/L    AST 14 0 - 40 U/L    ALT 12 0 - 45 U/L   HM1 (CBC  with Diff)   Result Value Ref Range    WBC 9.6 4.0 - 11.0 thou/uL    RBC 4.44 3.80 - 5.40 mill/uL    Hemoglobin 10.8 (L) 12.0 - 16.0 g/dL    Hematocrit 34.3 (L) 35.0 - 47.0 %    MCV 77 (L) 80 - 100 fL    MCH 24.3 (L) 27.0 - 34.0 pg    MCHC 31.5 (L) 32.0 - 36.0 g/dL    RDW 16.0 (H) 11.0 - 14.5 %    Platelets 372 140 - 440 thou/uL    MPV 9.7 8.5 - 12.5 fL    Neutrophils % 78 (H) 50 - 70 %    Lymphocytes % 15 (L) 20 - 40 %    Monocytes % 6 2 - 10 %    Eosinophils % 0 0 - 6 %    Basophils % 0 0 - 2 %    Immature Granulocyte % 0 <=0 %    Neutrophils Absolute 7.5 2.0 - 7.7 thou/uL    Lymphocytes Absolute 1.5 0.8 - 4.4 thou/uL    Monocytes Absolute 0.6 0.0 - 0.9 thou/uL    Eosinophils Absolute 0.0 0.0 - 0.4 thou/uL    Basophils Absolute 0.0 0.0 - 0.2 thou/uL    Immature Granulocyte Absolute 0.0 <=0.0 thou/uL       Imaging    Echo Complete    Result Date: 10/6/2020  1. Normal left ventricular size and systolic performance with a visually estimated ejection fraction of 60-65%. 2. No significant valvular heart disease is identified on this study. 3. Normal right ventricular size and systolic performance.     Us Breast Clip Placement Right    Result Date: 10/8/2020  EXAM: US BREAST CLIP PLACEMENT RIGHT, MAMMO POST CLIP PLACEMENT RIGHT LOCATION: [facility] SERVICES  [facility] DATE/TIME: 10/8/2020 8:53 AM INDICATION: Placement of tissue marker clip in right breast cancer at 6 o'clock position 2 cm from the nipple. PROCEDURE: Informed consent was obtained from the patient. A time out was performed where the proper patient, procedure, and site were confirmed. The skin was prepped and draped and 1% lidocaine used for local anesthesia. Under direct sonographic guidance, a Hydro marked marker clip was advanced through the target and the clip deployed. Postprocedure mammograms demonstrate the clip in appropriate position. The patient tolerated this well.     Sonographically-guided breast-marker clip centrally in previously  biopsied right breast mass which demonstrated invasive ductal carcinoma.    Mammo Diagnostic Bilateral    Result Date: 9/16/2020  EXAM: MAMMO DIAGNOSTIC 3D BILATERAL, US BREAST BILATERAL LIMITED 1-3 QUADRANTS LOCATION: [facility] DATE/TIME: 09/16/2020, 1:03 PM INDICATION: Lump in the right breast in the 6:00 position. COMPARISON: None. MAMMOGRAPHIC FINDINGS: Bilateral full-field digital diagnostic mammograms performed. The breasts are extremely dense, which lowers the sensitivity of mammography. Images evaluated with the assistance of CAD. Breast tomosynthesis was used in interpretation. Scattered benign-appearing round calcifications are seen in both breasts. In the right breast, in the 6:00 position, under the BB, there is a mass with some circumscribed and some obscured margins. On the left MLO view, there is prominence of the tissue in the upper outer quadrant. ULTRASOUND FINDINGS: Targeted bilateral breast ultrasound was performed by both the ultrasonographer and the radiologist. The palpable lump in the 6:00 position in the right breast and the upper outer quadrant of the left breast were examined. In the right breast in the 6:00 position 2 cm from the nipple, there was a hypoechoic circumscribed mass which was wider than it was tall measuring 2.4 x 1.3 x 1.9 cm correlating with the mammographic abnormality and the palpable mass. The appearance is most consistent with a benign fibroadenoma. However, ultrasound-guided biopsy versus excision was recommended to confirm this. In the left breast, a few tiny benign cysts were seen. The largest was in the 1:00 position 1 cm from the nipple measuring 4 mm in greatest dimension. There were no findings to suggest malignancy.     1.  Mass in the right breast correlates with the palpable and mammographic abnormalities. While this most likely represents a benign fibroadenoma, biopsy is recommended to confirm this. Alternatives of  ultrasound-guided needle biopsy versus surgical excision were discussed with the patient. She would prefer to have the lesion removed. Surgical consultation with Dr. Will on 09/29/2020 was scheduled. ACR BI-RADS Category 4A: Suspicious. Low Suspicion for Malignancy. Surgical consultation with Dr. Will on 09/29/2020 has been scheduled for the patient.     Mammo Post Clip Placement Right    Result Date: 10/8/2020  EXAM: US BREAST CLIP PLACEMENT RIGHT, MAMMO POST CLIP PLACEMENT RIGHT LOCATION: [facility] DATE/TIME: 10/8/2020 8:53 AM INDICATION: Placement of tissue marker clip in right breast cancer at 6 o'clock position 2 cm from the nipple. PROCEDURE: Informed consent was obtained from the patient. A time out was performed where the proper patient, procedure, and site were confirmed. The skin was prepped and draped and 1% lidocaine used for local anesthesia. Under direct sonographic guidance, a Hydro marked marker clip was advanced through the target and the clip deployed. Postprocedure mammograms demonstrate the clip in appropriate position. The patient tolerated this well.     Sonographically-guided breast-marker clip centrally in previously biopsied right breast mass which demonstrated invasive ductal carcinoma.    Nm Pet Ct Skull To Mid Thigh    Result Date: 10/9/2020  EXAM: NM PET CT SKULL TO MID THIGH LOCATION: [facility] DATE/TIME: 10/9/2020 8:44 AM INDICATION: Breast cancer, right, staging. Malignant neoplasm of lower inner quadrant of right breast of female, ER negative. High-grade poorly differentiated carcinoma. Indeterminate pulmonary nodule on recent CT scan. Initial treatment strategy. COMPARISON: CT chest abdomen pelvis 10/06/2020 reviewed. TECHNIQUE: Serum glucose level 80 mg/dL. One hour post intravenous administration of 9.5 mCi F-18 FDG, PET imaging was performed from the skull base to the mid thighs utilizing attenuation correction with  concurrent axial CT and PET/CT image fusion. Dose  reduction techniques were used. FINDINGS: FDG avid 2.6 cm mass in the lower inner right breast demonstrates marked uptake (SUVmax 7.6), consistent with biopsy-proven malignancy. Normal physiologic FDG uptake elsewhere. No evidence of FDG avid metastasis. No FDG avid adenopathy in the right axilla or elsewhere. A circumscribed 0.7 cm pulmonary nodule in the left lower lobe, as depicted on CT 10/06/2020, demonstrates no FDG activity. No suspicious focal uptake in the liver or skeleton. Normal physiologic uptake in the uterine endometrium. Left subclavian Port-A-Cath with tip near the SVC/RA junction. Hypoattenuation of blood pool relative to myocardium suggesting anemia. Diffuse hepatic steatosis. Small left renal cyst, no follow-up needed. Calcified uterine fibroids. Mild degenerative changes cervical spine.     1. FDG avid right breast mass consistent with biopsy-proven malignancy. 2. No evidence of FDG avid edgard or distant metastasis. 3. An indeterminate 0.7 cm pulmonary nodule in the left lower lobe demonstrates no FDG activity, favoring a benign etiology, possibly hamartoma or granuloma. Chest CT imaging surveillance recommended.     Ct Chest Abdomen Pelvis Without Oral With Iv Contrast    Result Date: 10/6/2020  EXAM: CT CHEST ABDOMEN PELVIS WO ORAL W IV CONTRAST LOCATION: [facility] DATE/TIME: 10/6/2020 6:39 PM INDICATION: High-grade carcinoma right breast, evaluate for metastatic disease. COMPARISON: None. TECHNIQUE: CT scan of the chest, abdomen, and pelvis was performed following injection of IV contrast. Multiplanar reformats were obtained. Dose reduction techniques were used. CONTRAST: 100 mL Omnipaque 350. FINDINGS: LUNGS AND PLEURA: Noncalcified 6 x 7 mm pulmonary nodule left lower lobe on image #105 indeterminate. No other nodules. Mild fibroatelectasis. Mild air trapping often associated with small vessel or small airways  disease. MEDIASTINUM/AXILLAE: No adenopathy by size criteria within the mediastinum, bilateral hilar areas, or axillary regions. HEPATOBILIARY: Two 3 mm and smaller lesions in left and right lobe of liver too small to characterize definitively. Gallbladder unremarkable. PANCREAS: Normal. SPLEEN: Normal. ADRENAL GLANDS: Normal. KIDNEYS/BLADDER: Bilateral small renal cysts requiring no further evaluation, otherwise normal. BOWEL: Normal. LYMPH NODES: Normal. VASCULATURE: Retroaortic left renal vein. No aortic aneurysm. PELVIC ORGANS: Heterogeneous uterus with calcified and presumed noncalcified fibroids. Adnexa unremarkable. MUSCULOSKELETAL: 2.6 x 2.3 x 1.8 cm right breast mass consistent with patient's known malignancy. Degenerative disease. Presumed bone islands.     1.  Right breast mass consistent with patient's known malignancy. 2.  Indeterminate left lower lobe pulmonary nodule. PET/CT recommended for further evaluation. 3.  Tiny hepatic lesions too small to characterize definitively, prior could confirm these are benign. 4.  Heterogeneous uterus with presumed fibroids, though noncalcified lesions are technically indeterminate. 5.  Mild air trapping often associated with small vessel or small airways disease.    Us Breast Bilateral Limited 1-3 Quadrants    Result Date: 9/17/2020  EXAM: MAMMO DIAGNOSTIC 3D BILATERAL, US BREAST BILATERAL LIMITED 1-3 QUADRANTS LOCATION: [facility] OUTPATIENT SERVICES DATE/TIME: 09/16/2020, 1:03 PM INDICATION: Lump in the right breast in the 6:00 position. COMPARISON: None. MAMMOGRAPHIC FINDINGS: Bilateral full-field digital diagnostic mammograms performed. The breasts are extremely dense, which lowers the sensitivity of mammography. Images evaluated with the assistance of CAD. Breast tomosynthesis was used in interpretation. Scattered benign-appearing round calcifications are seen in both breasts. In the right breast, in the 6:00 position, under the BB, there is a mass  with some circumscribed and some obscured margins. On the left MLO view, there is prominence of the tissue in the upper outer quadrant. ULTRASOUND FINDINGS: Targeted bilateral breast ultrasound was performed by both the ultrasonographer and the radiologist. The palpable lump in the 6:00 position in the right breast and the upper outer quadrant of the left breast were examined. In the right breast in the 6:00 position 2 cm from the nipple, there was a hypoechoic circumscribed mass which was wider than it was tall measuring 2.4 x 1.3 x 1.9 cm correlating with the mammographic abnormality and the palpable mass. The appearance is most consistent with a benign fibroadenoma. However, ultrasound-guided biopsy versus excision was recommended to confirm this. In the left breast, a few tiny benign cysts were seen. The largest was in the 1:00 position 1 cm from the nipple measuring 4 mm in greatest dimension. There were no findings to suggest malignancy.     1.  Mass in the right breast correlates with the palpable and mammographic abnormalities. While this most likely represents a benign fibroadenoma, biopsy is recommended to confirm this. Alternatives of ultrasound-guided needle biopsy versus surgical excision were discussed with the patient. She would prefer to have the lesion removed. Surgical consultation with Dr. Will on 09/29/2020 was scheduled. ACR BI-RADS Category 4A: Suspicious. Low Suspicion for Malignancy. Surgical consultation with Dr. Will on 09/29/2020 has been scheduled for the patient.     Us Axilla Non Vascular Right    Result Date: 10/6/2020  EXAM: US AXILLA NON VASCULAR RIGHT LOCATION: [facility] DATE/TIME: 10/6/2020 7:14 PM INDICATION: Malignant neoplasm of lower-inner quadrant of right female breast, evaluate for adenopathy COMPARISON: CT earlier today TECHNIQUE: Routine. FINDINGS: No pathologic adenopathy identified with normal fatty replaced lymph nodes noted.     1.  No  pathologic adenopathy identified.      Total time spent with patient was 45 minutes.  Greater than 50% of that was in counseling and care coordination.    Signed by: Kaela Plunkett CNP

## 2021-06-12 NOTE — TELEPHONE ENCOUNTER
Patient left message stating questions about 2 medications:  Dexamethasone, zofran and requesting numbing medication for port. She asked for call back to [phone].    I called patient back. Answered her question re: dexamethasone and zofran to the best of my ability. Instructions given on when and how to take.      I told her I would sent EMLA Rx to Dr. Olsen. Instructed on how to use.     Patient aware of her appointment on 10/9 at [facility] to start chemo/STANLEY Hicks RN

## 2021-06-12 NOTE — PROGRESS NOTES
Alice came to chemo infusion this morning following port lab draw and NP visit for cycle 1 day 1 treatment with Doxorubicin and Cyclophosphamide.  She was educated on her treatment plan and each medication was reviewed prior to administration.  She was also given the education folder and potential side effects were discussed.  She has been consented.  She received treatment as ordered and tolerated it well while in clinic today. Port was flushed with ns, heparinized then de-accessed and site covered.  She watched the education video on the Neulasta on body injector..  This information was also reviewed verbally with her.  Neulasta injector placed on her abdomen.  Alice left clinic ambulatory.

## 2021-06-12 NOTE — PROGRESS NOTES
[facility] to see Kaela LIM. Pt has Breast Cancer and is here for treatment.

## 2021-06-12 NOTE — PROGRESS NOTES
Patient is here for labs, provider and treatment for Malignant neoplasm of lower-inner quadrant of right female breast, unspecified estrogen receptor status.

## 2021-06-12 NOTE — PROGRESS NOTES
[facility] Hematology and Oncology Progress Note    Patient: Jes Mistry  MRN: [id number]  Date of Service: 11/05/2020        Reason for Visit    Chief Complaint   Patient presents with     HE Cancer     Malignant neoplasm of lower-inner quadrant of right female breast, unspecified estrogen receptor status       Assessment and Plan  Cancer Staging  Malignant neoplasm of lower-inner quadrant of right breast of female, estrogen receptor negative (H)  Staging form: Breast, AJCC 8th Edition  - Clinical: Stage IIB (cT2, cN0, cM0, G3, ER-, WA-, HER2-) - Signed by Kaela Plunkett, CNP on 10/9/2020    1. Breast cancer, stage IIB: pt with triple negative disease. Has started neoadjuvant chemo with dose dense AC.  She will get cycle 3 today. Return in 2 weeks for cycle 4.     2.  7 mm pulmonary nodule: This does not have any activity on PET scan so likely benign.  Radiology did recommend a follow-up so we will likely repeat a CT scan in 6 months, roughly April.    3. Mild fatigue, nausea, heartburn: from chemo. Better this cycle. Using tums/zofran prn. Encourage good hydration and activity.     4. Microcytic anemia: could be iron deficient. Recently stopped eating meat. Her MCV is improved. Started a small iron supplement and is now eating a little more red meat. I did tell her she can stop iron if she would like if she continues to eat meat.     ECOG Performance   ECOG Performance Status: 1     Distress Assessment  Distress Assessment Score: No distress      Pain  Currently in Pain: No/denies      Problem List    1. Chemotherapy-induced neutropenia (H)  CC OFFICE VISIT LONG    DISCONTINUED: sodium chloride 0.9% 250 mL infusion    DISCONTINUED: palonosetron injection 0.25 mg (ALOXI)    DISCONTINUED: fosaprepitant 150 mg, dexAMETHasone 12 mg in sodium chloride 0.9% 150 mL    DISCONTINUED: DOXOrubicin 100 mg (ADRIAMYCIN)    DISCONTINUED: cyclophosphamide 950 mg in sodium chloride 0.9% 250 mL (CYTOXAN)     DISCONTINUED: pegfilgrastim injection 6 mg (NEULASTA DELIVERY KIT)    DISCONTINUED: sodium chloride flush 20 mL (NS)    DISCONTINUED: heparin lockflush (PF) porcine 300-600 Units    DISCONTINUED: diphenhydrAMINE injection 50 mg (BENADRYL)    DISCONTINUED: famotidine 20 mg/2 mL injection 20 mg (PEPCID)    DISCONTINUED: hydrocortisone sod succ (PF) injection 100 mg    DISCONTINUED: acetaminophen tablet 1,000 mg (TYLENOL)    DISCONTINUED: sodium chloride 0.9% 500 mL   2. Malignant neoplasm of lower-inner quadrant of right female breast, unspecified estrogen receptor status (H)  CC OFFICE VISIT LONG    DISCONTINUED: sodium chloride 0.9% 250 mL infusion    DISCONTINUED: palonosetron injection 0.25 mg (ALOXI)    DISCONTINUED: fosaprepitant 150 mg, dexAMETHasone 12 mg in sodium chloride 0.9% 150 mL    DISCONTINUED: DOXOrubicin 100 mg (ADRIAMYCIN)    DISCONTINUED: cyclophosphamide 950 mg in sodium chloride 0.9% 250 mL (CYTOXAN)    DISCONTINUED: pegfilgrastim injection 6 mg (NEULASTA DELIVERY KIT)    DISCONTINUED: sodium chloride flush 20 mL (NS)    DISCONTINUED: heparin lockflush (PF) porcine 300-600 Units    DISCONTINUED: diphenhydrAMINE injection 50 mg (BENADRYL)    DISCONTINUED: famotidine 20 mg/2 mL injection 20 mg (PEPCID)    DISCONTINUED: hydrocortisone sod succ (PF) injection 100 mg    DISCONTINUED: acetaminophen tablet 1,000 mg (TYLENOL)    DISCONTINUED: sodium chloride 0.9% 500 mL      ______________________________________________________________________________    History of Present Illness    DIAGNOSIS: breast cancer, right breast. Palpable lump. 24mm. High grade, poorly differentiated carcinoma. ER1%+, OH-HER2/emily- so essentially triple negative. T2N0M0, Stage IIB    TREATMENT: dose dense AC in neoadjuvant fashion.  Today is cycle 3    INTERIM HISTORY: pt is here today to continue chemo. She is doing well. States nausea was a little better this time. Having some worsening fatigue, but otherwise is happy with  "how she is feeling    Review of Systems    Constitutional  Constitutional (WDL): Exceptions to WDL  Fatigue: Concerns(relieved with rest)  Hot flashes/Night Sweats: Concerns(night sweats 2 times this week)  Neurosensory  Neurosensory (WDL): All neurosensory elements are within defined limits  Eye   Eye Disorder (WDL): Exceptions to WDL(redness occ in right eye)  Blurred Vision: Concerns  Ear  Ear Disorder (WDL): All ear disorder elements are within defined limits  Cardiovascular  Cardiovascular (WDL): Exceptions to WDL(pressure on chest)  Pulmonary  Respiratory (WDL): Within Defined Limits  Gastrointestinal  Gastrointestinal (WDL): All gastrointestinal elements are within defined limits  Genitourinary  Genitourinary (WDL): All genitourinary elements are within defined limits  Lymphatic  Lymph (WDL): All lymph disorder elements are within defined limits  Musculoskeletal and Connective Tissue  Musculoskeletal and Connetive Tissue Disorders (WDL): All Musculoskeletal and Connetive Tissue Disorder elements are within defined limits  Integumentary  Integumentary (WDL): Exceptions to WDL(tenderness on forhead)  Patient Coping  Patient Coping: Accepting;Open/discussion  Accompanied by  Accompanied by: Alone  Oral Chemo Adherence           Past History  Past Medical History:   Diagnosis Date     History of anesthesia complications     Slow to wake up     HTN (hypertension)      Nonsmoker      Thyroid nodule 2011       PHYSICAL EXAM  /68   Pulse 60   Temp 97.7  F (36.5  C) (Oral)   Ht 5' 3\" (1.6 m)   Wt 121 lb 11.2 oz (55.2 kg)   SpO2 100%   BMI 21.56 kg/m      GENERAL: no acute distress. Cooperative in conversation. Here alone due to visitor restrictions. Mask on  RESP: Regular respiratory rate. No expiratory wheezes   MUSCULOSKELETAL: no bilateral leg swelling  NEURO: non focal. Alert and oriented x3.   PSYCH: within normal limits. No depression or anxiety.  SKIN: exposed skin is dry intact.     Lab " Results    Recent Results (from the past 168 hour(s))   Comprehensive Metabolic Panel   Result Value Ref Range    Sodium 140 136 - 145 mmol/L    Potassium 4.2 3.5 - 5.0 mmol/L    Chloride 105 98 - 107 mmol/L    CO2 28 22 - 31 mmol/L    Anion Gap, Calculation 7 5 - 18 mmol/L    Glucose 86 70 - 125 mg/dL    BUN 14 8 - 22 mg/dL    Creatinine 0.67 0.60 - 1.10 mg/dL    GFR MDRD Af Amer >60 >60 mL/min/1.73m2    GFR MDRD Non Af Amer >60 >60 mL/min/1.73m2    Bilirubin, Total 0.2 0.0 - 1.0 mg/dL    Calcium 9.3 8.5 - 10.5 mg/dL    Protein, Total 7.2 6.0 - 8.0 g/dL    Albumin 4.2 3.5 - 5.0 g/dL    Alkaline Phosphatase 81 45 - 120 U/L    AST 14 0 - 40 U/L    ALT 19 0 - 45 U/L   HM1 (CBC with Diff)   Result Value Ref Range    WBC 8.6 4.0 - 11.0 thou/uL    RBC 4.28 3.80 - 5.40 mill/uL    Hemoglobin 10.9 (L) 12.0 - 16.0 g/dL    Hematocrit 34.6 (L) 35.0 - 47.0 %    MCV 81 80 - 100 fL    MCH 25.5 (L) 27.0 - 34.0 pg    MCHC 31.5 (L) 32.0 - 36.0 g/dL    RDW 19.9 (H) 11.0 - 14.5 %    Platelets 234 140 - 440 thou/uL    MPV 9.4 8.5 - 12.5 fL   Manual Differential   Result Value Ref Range    Total Neutrophils % 79 (H) 50 - 70 %    Lymphocytes % 12 (L) 20 - 40 %    Monocytes % 9 2 - 10 %    Eosinophils %  0 0 - 6 %    Basophils % 0 0 - 2 %    Immature Granulocyte % - Manual 0 <=0 %    Total Neutrophils Absolute 6.8 2.0 - 7.7 thou/ul    Lymphocytes Absolute 1.0 0.8 - 4.4 thou/uL    Monocytes Absolute 0.8 0.0 - 0.9 thou/uL    Eosinophils Absolute 0.0 0.0 - 0.4 thou/uL    Basophils Absolute 0.0 0.0 - 0.2 thou/uL    Immature Granulocyte Absolute - Manual 0.0 <=0.0 thou/uL    Platelet Estimate Normal Normal    Ovalocytes 1+ (!) Negative    Polychromasia 1+ (!) Negative       Imaging    Us Breast Clip Placement Right    Result Date: 10/8/2020  EXAM: US BREAST CLIP PLACEMENT RIGHT, MAMMO POST CLIP PLACEMENT RIGHT LOCATION: [facility] SERVICES  [facility] DATE/TIME: 10/8/2020 8:53 AM INDICATION: Placement of tissue marker clip in right  breast cancer at 6 o'clock position 2 cm from the nipple. PROCEDURE: Informed consent was obtained from the patient. A time out was performed where the proper patient, procedure, and site were confirmed. The skin was prepped and draped and 1% lidocaine used for local anesthesia. Under direct sonographic guidance, a Hydro marked marker clip was advanced through the target and the clip deployed. Postprocedure mammograms demonstrate the clip in appropriate position. The patient tolerated this well.     Sonographically-guided breast-marker clip centrally in previously biopsied right breast mass which demonstrated invasive ductal carcinoma.    Mammo Post Clip Placement Right    Result Date: 10/8/2020  EXAM: US BREAST CLIP PLACEMENT RIGHT, MAMMO POST CLIP PLACEMENT RIGHT LOCATION: [facility] DATE/TIME: 10/8/2020 8:53 AM INDICATION: Placement of tissue marker clip in right breast cancer at 6 o'clock position 2 cm from the nipple. PROCEDURE: Informed consent was obtained from the patient. A time out was performed where the proper patient, procedure, and site were confirmed. The skin was prepped and draped and 1% lidocaine used for local anesthesia. Under direct sonographic guidance, a Hydro marked marker clip was advanced through the target and the clip deployed. Postprocedure mammograms demonstrate the clip in appropriate position. The patient tolerated this well.     Sonographically-guided breast-marker clip centrally in previously biopsied right breast mass which demonstrated invasive ductal carcinoma.    Nm Pet Ct Skull To Mid Thigh    Result Date: 10/9/2020  EXAM: NM PET CT SKULL TO MID THIGH LOCATION: [facility] DATE/TIME: 10/9/2020 8:44 AM INDICATION: Breast cancer, right, staging. Malignant neoplasm of lower inner quadrant of right breast of female, ER negative. High-grade poorly differentiated carcinoma. Indeterminate pulmonary nodule on recent CT scan.  Initial treatment strategy. COMPARISON: CT chest abdomen pelvis 10/06/2020 reviewed. TECHNIQUE: Serum glucose level 80 mg/dL. One hour post intravenous administration of 9.5 mCi F-18 FDG, PET imaging was performed from the skull base to the mid thighs utilizing attenuation correction with concurrent axial CT and PET/CT image fusion. Dose  reduction techniques were used. FINDINGS: FDG avid 2.6 cm mass in the lower inner right breast demonstrates marked uptake (SUVmax 7.6), consistent with biopsy-proven malignancy. Normal physiologic FDG uptake elsewhere. No evidence of FDG avid metastasis. No FDG avid adenopathy in the right axilla or elsewhere. A circumscribed 0.7 cm pulmonary nodule in the left lower lobe, as depicted on CT 10/06/2020, demonstrates no FDG activity. No suspicious focal uptake in the liver or skeleton. Normal physiologic uptake in the uterine endometrium. Left subclavian Port-A-Cath with tip near the SVC/RA junction. Hypoattenuation of blood pool relative to myocardium suggesting anemia. Diffuse hepatic steatosis. Small left renal cyst, no follow-up needed. Calcified uterine fibroids. Mild degenerative changes cervical spine.     1. FDG avid right breast mass consistent with biopsy-proven malignancy. 2. No evidence of FDG avid edgard or distant metastasis. 3. An indeterminate 0.7 cm pulmonary nodule in the left lower lobe demonstrates no FDG activity, favoring a benign etiology, possibly hamartoma or granuloma. Chest CT imaging surveillance recommended.     Ct Chest Abdomen Pelvis Without Oral With Iv Contrast    Result Date: 10/6/2020  EXAM: CT CHEST ABDOMEN PELVIS WO ORAL W IV CONTRAST LOCATION: [facility] DATE/TIME: 10/6/2020 6:39 PM INDICATION: High-grade carcinoma right breast, evaluate for metastatic disease. COMPARISON: None. TECHNIQUE: CT scan of the chest, abdomen, and pelvis was performed following injection of IV contrast. Multiplanar reformats were obtained. Dose  reduction techniques were used. CONTRAST: 100 mL Omnipaque 350. FINDINGS: LUNGS AND PLEURA: Noncalcified 6 x 7 mm pulmonary nodule left lower lobe on image #105 indeterminate. No other nodules. Mild fibroatelectasis. Mild air trapping often associated with small vessel or small airways disease. MEDIASTINUM/AXILLAE: No adenopathy by size criteria within the mediastinum, bilateral hilar areas, or axillary regions. HEPATOBILIARY: Two 3 mm and smaller lesions in left and right lobe of liver too small to characterize definitively. Gallbladder unremarkable. PANCREAS: Normal. SPLEEN: Normal. ADRENAL GLANDS: Normal. KIDNEYS/BLADDER: Bilateral small renal cysts requiring no further evaluation, otherwise normal. BOWEL: Normal. LYMPH NODES: Normal. VASCULATURE: Retroaortic left renal vein. No aortic aneurysm. PELVIC ORGANS: Heterogeneous uterus with calcified and presumed noncalcified fibroids. Adnexa unremarkable. MUSCULOSKELETAL: 2.6 x 2.3 x 1.8 cm right breast mass consistent with patient's known malignancy. Degenerative disease. Presumed bone islands.     1.  Right breast mass consistent with patient's known malignancy. 2.  Indeterminate left lower lobe pulmonary nodule. PET/CT recommended for further evaluation. 3.  Tiny hepatic lesions too small to characterize definitively, prior could confirm these are benign. 4.  Heterogeneous uterus with presumed fibroids, though noncalcified lesions are technically indeterminate. 5.  Mild air trapping often associated with small vessel or small airways disease.    Us Axilla Non Vascular Right    Result Date: 10/6/2020  EXAM: US AXILLA NON VASCULAR RIGHT LOCATION: [facility] DATE/TIME: 10/6/2020 7:14 PM INDICATION: Malignant neoplasm of lower-inner quadrant of right female breast, evaluate for adenopathy COMPARISON: CT earlier today TECHNIQUE: Routine. FINDINGS: No pathologic adenopathy identified with normal fatty replaced lymph nodes noted.     1.  No  pathologic adenopathy identified.          Signed by: Kaela Plunkett, CNP

## 2021-06-12 NOTE — TELEPHONE ENCOUNTER
New Patient Oncology Nurse Navigator Note     Referring provider: Dr. Will     Referring Clinic/Organization:  [facility]     Referred to (specialty): Medical Oncology    Requested provider (if applicable): N/A     Date Referral Received: 10/2/2020     Evaluation for : Right Breast Cancer      Clinical History (per Nurse review of records provided):  Alice palpated mass in right breast and provider at [facility],  ordered mammogram/US which were completed at the [facility] on 9/16/2020.  Imaging suggested a finding of benign fibroadenoma but biopsy recommended to confirm. Biopsy was performed on 9/29/2020 and the verbal report that was provided to Dr. Will was high grade breast cancer, poorly differentiated. Receptors pending at time of this entry and path report should be reported later this afternoon. Dr. Will wanted patient to be seen in the next week as tumor has increased in size rapidly and suspects chemo will be needed. Alice was scheduled at  campus to accommodate getting her scheduled eatly but she plans to have her future care at .      Clinical Assessment / Barriers to Care (Per Nurse): no     Records Location (Care Everywhere, Media, etc.): [place], [place]  Patient denies care at systems outside of [facility] and [facility]-no records in Care Everywhere      Records Needed: [facility]-ABY was completed and forwarded to [facility] in Medical records    Additional testing needed prior to consult: Pathology will be officially reported later this afternoon    Called Alice  to schedule new medical oncology consult as ordered by Dr. Will. Appointment with Dr. Olsen  was scheduled on Monday, 10/5/2020, check in at 10:30 a.m. at .  Alice was instructed to arrive to the clinic wearing a face covering and voiced understanding of the visitor restriction policy due to the pandemic. New patient letter/map with appointment details,  health history form  to complete, medication form to update and person-to person communication form were all sent to patient at confirmed e-mail in Epic. ABY for medical records was completed and forwarded to medical records department to process. Nurse navigator role was introduced and letter was enclosed with further explanation. Alice has writer's contact information for this week and understands that Molly will be her nurse navigator at [facility], as she plans to have her care at  in the [place] [facility]  Molly will reach our to Alice in the near future.

## 2021-06-12 NOTE — TELEPHONE ENCOUNTER
Pt returned Miriam's phone call from yesterday.  Pt states that overall her first treatment went really well.  Pt had some manageable nausea night of treatment as well as heartburn following treatment otherwise she feels pretty good.  Pt states that she stopped steroids yesterday.  Instructed pt that she might feel more side effects after the steroids leave her system otherwise she should feel about the same with future treatments.  Instructed pt that there is a cumulative effect that can happen with more chemotherapy, but typically side effects tend to be slightly more increased with each dose.  Pt stated that her side effects thus far have been very manageable, so she feels better know what to expect.  Instructed pt to call clinic with any further questions or concerns.  Pt verbalized understanding of plan of care and return to clinic.

## 2021-06-12 NOTE — PROGRESS NOTES
"Patient arrived ambulatory and was seated in the lab draw chair. Accessed port a cath per sterile technique and obtained an excellent blood return. Labs were drawn at 08:55. Patient needs to deep breathe through this, and was given emotional support. Talked briefly about \"my first treatment.\" Escorted patient to the lobby to wait for an appointment with SEVEIRNO Plunkett CNP.    Ashlie Ledbetter RN  "

## 2021-06-12 NOTE — TELEPHONE ENCOUNTER
Called pt to see how she is feeling after her 1st chemo treatment on 10/9. No answer. Left message for pt to return call if she has any questions or concerns.

## 2021-06-12 NOTE — PROGRESS NOTES
Pt arrived at [facility] to see Dr. Olsen. Pt is a new consult for Breast Cancer.

## 2021-06-13 NOTE — PROGRESS NOTES
[facility] Hematology and Oncology Progress Note    Patient: Jes Mistry  MRN: [id number]  Date of Service: 12/03/2020        Reason for Visit    Chief Complaint   Patient presents with     HE Cancer     Malignant neoplasm of lower-inner quadrant of right female breast, unspecified estrogen receptor status       Assessment and Plan  Cancer Staging  Malignant neoplasm of lower-inner quadrant of right breast of female, estrogen receptor negative (H)  Staging form: Breast, AJCC 8th Edition  - Clinical: Stage IIB (cT2, cN0, cM0, G3, ER-, DE-, HER2-) - Signed by Kaela Plunkett, CNP on 10/9/2020    1. Breast cancer, stage IIB: pt with triple negative disease. Has started neoadjuvant chemo with dose dense AC.  She has now completed 4 cycles and will start weekly Taxol. Continues to respond to treatment. Tumor is quite small. She will see Dr. Olsen in 3 weeks. We discussed again, the expected side effects of Taxol.     2.  7 mm pulmonary nodule: This does not have any activity on PET scan so likely benign.  Radiology did recommend a follow-up so we will likely repeat a CT scan in 6 months, roughly April.    3. Young age, triple negative disease: awaiting to meet with genetic counselor. At first felt a little overwhelmed with everything, but states she will get that set up so we can get testing done. She is leaning towards mastectomy, but I think it will be important to get testing done.     4. Microcytic anemia: could be iron deficient. Recently stopped eating meat. Her MCV is improved. Started a small iron supplement and is now eating a little more red meat. She is back to taking iron daily since she isn't doing well with eating iron again.     ECOG Performance   ECOG Performance Status: 0     Distress Assessment  Distress Assessment Score: No distress      Pain  Currently in Pain: No/denies      Problem List    1. Chemotherapy-induced neutropenia (H)  CC OFFICE VISIT LONG    Infusion Appointment     Infusion Appointment    CC OFFICE VISIT LONG    Infusion Appointment    DISCONTINUED: sodium chloride 0.9% 250 mL infusion    DISCONTINUED: dexAMETHasone 12 mg in sodium chloride 0.9% 25 mL (DECADRON)    DISCONTINUED: diphenhydrAMINE injection 25 mg (BENADRYL)    DISCONTINUED: famotidine 20 mg/2 mL injection 20 mg (PEPCID)    DISCONTINUED: PACLitaxeL 132 mg in NaCl 0.9% (non-PVC) 250 mL (TAXOL)    DISCONTINUED: sodium chloride flush 20 mL (NS)    DISCONTINUED: heparin lockflush (PF) porcine 300-600 Units    DISCONTINUED: diphenhydrAMINE injection 50 mg (BENADRYL)    DISCONTINUED: famotidine 20 mg/2 mL injection 20 mg (PEPCID)    DISCONTINUED: hydrocortisone sod succ (PF) injection 100 mg    DISCONTINUED: acetaminophen tablet 1,000 mg (TYLENOL)    DISCONTINUED: sodium chloride 0.9% 500 mL   2. Malignant neoplasm of lower-inner quadrant of right female breast, unspecified estrogen receptor status (H)  CC OFFICE VISIT LONG    Infusion Appointment    Infusion Appointment    CC OFFICE VISIT LONG    Infusion Appointment    DISCONTINUED: sodium chloride 0.9% 250 mL infusion    DISCONTINUED: dexAMETHasone 12 mg in sodium chloride 0.9% 25 mL (DECADRON)    DISCONTINUED: diphenhydrAMINE injection 25 mg (BENADRYL)    DISCONTINUED: famotidine 20 mg/2 mL injection 20 mg (PEPCID)    DISCONTINUED: PACLitaxeL 132 mg in NaCl 0.9% (non-PVC) 250 mL (TAXOL)    DISCONTINUED: sodium chloride flush 20 mL (NS)    DISCONTINUED: heparin lockflush (PF) porcine 300-600 Units    DISCONTINUED: diphenhydrAMINE injection 50 mg (BENADRYL)    DISCONTINUED: famotidine 20 mg/2 mL injection 20 mg (PEPCID)    DISCONTINUED: hydrocortisone sod succ (PF) injection 100 mg    DISCONTINUED: acetaminophen tablet 1,000 mg (TYLENOL)    DISCONTINUED: sodium chloride 0.9% 500 mL      ______________________________________________________________________________    History of Present Illness    DIAGNOSIS: breast cancer, right breast. Palpable lump. 24mm. High grade,  "poorly differentiated carcinoma. ER1%+, OK-HER2/emily- so essentially triple negative. T2N0M0, Stage IIB    TREATMENT: dose dense AC in neoadjuvant fashion.  Completed 4 cycles. Will start weekly Taxol today.     INTERIM HISTORY: pt is here today to continue chemo. She is doing well.  Having some worsening fatigue. Mild nausea. Mild neuropathy.     Review of Systems    Constitutional  Constitutional (WDL): Exceptions to WDL  Hot flashes/Night Sweats: Concerns(intermittently)  Neurosensory  Neurosensory (WDL): Exceptions to WDL  Peripheral Motor Neuropathy: Concerns(fingertips mild)  Peripheral Sensory Neuropathy: Concerns(fingertips mild)  Eye   Eye Disorder (WDL): Exceptions to WDL  Blurred Vision: Concerns(intermittently)  Ear  Ear Disorder (WDL): All ear disorder elements are within defined limits  Cardiovascular  Cardiovascular (WDL): Exceptions to WDL  Palpitations: Concerns(episode of flutters 2-3 days post treatment)  Pulmonary  Respiratory (WDL): Within Defined Limits  Gastrointestinal  Gastrointestinal (WDL): All gastrointestinal elements are within defined limits  Genitourinary  Genitourinary (WDL): All genitourinary elements are within defined limits  Lymphatic  Lymph (WDL): All lymph disorder elements are within defined limits  Musculoskeletal and Connective Tissue  Musculoskeletal and Connetive Tissue Disorders (WDL): All Musculoskeletal and Connetive Tissue Disorder elements are within defined limits  Integumentary  Integumentary (WDL): All integumentary elements are within defined limits  Patient Coping  Patient Coping: Accepting;Open/discussion  Accompanied by  Accompanied by: Alone  Oral Chemo Adherence           Past History  Past Medical History:   Diagnosis Date     History of anesthesia complications     Slow to wake up     HTN (hypertension)      Nonsmoker      Thyroid nodule 2011       PHYSICAL EXAM  /64   Pulse 68   Temp 97.9  F (36.6  C) (Oral)   Ht 5' 3\" (1.6 m)   Wt 123 lb 9.6 oz " (56.1 kg)   SpO2 98%   BMI 21.89 kg/m      GENERAL: no acute distress. Cooperative in conversation. Here alone due to visitor restrictions. Mask on  RESP: Regular respiratory rate. No expiratory wheezes   MUSCULOSKELETAL: no bilateral leg swelling  NEURO: non focal. Alert and oriented x3.   PSYCH: within normal limits. No depression or anxiety.  SKIN: exposed skin is dry intact.   BREAST: small tumor still palpable. Probably about 3-5mm    Lab Results    Recent Results (from the past 168 hour(s))   Comprehensive Metabolic Panel   Result Value Ref Range    Sodium 143 136 - 145 mmol/L    Potassium 4.0 3.5 - 5.0 mmol/L    Chloride 103 98 - 107 mmol/L    CO2 30 22 - 31 mmol/L    Anion Gap, Calculation 10 5 - 18 mmol/L    Glucose 122 70 - 125 mg/dL    BUN 15 8 - 22 mg/dL    Creatinine 0.68 0.60 - 1.10 mg/dL    GFR MDRD Af Amer >60 >60 mL/min/1.73m2    GFR MDRD Non Af Amer >60 >60 mL/min/1.73m2    Bilirubin, Total 0.3 0.0 - 1.0 mg/dL    Calcium 9.7 8.5 - 10.5 mg/dL    Protein, Total 7.2 6.0 - 8.0 g/dL    Albumin 4.3 3.5 - 5.0 g/dL    Alkaline Phosphatase 88 45 - 120 U/L    AST 16 0 - 40 U/L    ALT 23 0 - 45 U/L   HM1 (CBC with Diff)   Result Value Ref Range    WBC 6.7 4.0 - 11.0 thou/uL    RBC 4.05 3.80 - 5.40 mill/uL    Hemoglobin 11.2 (L) 12.0 - 16.0 g/dL    Hematocrit 34.4 (L) 35.0 - 47.0 %    MCV 85 80 - 100 fL    MCH 27.7 27.0 - 34.0 pg    MCHC 32.6 32.0 - 36.0 g/dL    RDW 23.9 (H) 11.0 - 14.5 %    Platelets 158 140 - 440 thou/uL    MPV 9.8 8.5 - 12.5 fL   Manual Differential   Result Value Ref Range    Total Neutrophils % 77 (H) 50 - 70 %    Lymphocytes % 12 (L) 20 - 40 %    Monocytes % 6 2 - 10 %    Eosinophils %  0 0 - 6 %    Basophils % 0 0 - 2 %    Metamyelocytes % 4 (H) <=1 %    Myelocytes % 1 <=1 %    Immature Granulocyte % - Manual 5 (H) <=0 %    Total Neutrophils Absolute 5.2 2.0 - 7.7 thou/ul    Lymphocytes Absolute 0.8 0.8 - 4.4 thou/uL    Monocytes Absolute 0.4 0.0 - 0.9 thou/uL    Eosinophils Absolute  0.0 0.0 - 0.4 thou/uL    Basophils Absolute 0.0 0.0 - 0.2 thou/uL    Metamyelocytes Absolute 0.3 (H) <=0.1 thou/uL    Myelocytes Absolute 0.1 <=0.1 thou/uL    Immature Granulocyte Absolute - Manual 0.3 (H) <=0.0 thou/uL    Platelet Estimate Normal Normal    Polychromasia 1+ (!) Negative    Tear Drop Cells 1+ (!) Negative       Imaging    No results found.        Signed by: Kaela Plunkett, CNP

## 2021-06-13 NOTE — PROGRESS NOTES
Pt arrived for labs and infusion.  Labs came  Back within parameters so D8 C5 infusion of Taxol given over 1 hr.  Pt tolerated well. Port was flushed and then removed.  Site cleaned and dry. Pt left ambulatory afterwards.

## 2021-06-13 NOTE — TELEPHONE ENCOUNTER
Alice returned my call and shared her thoughts and feelings.  She had been feeling relatively at ease with treatment and the emotions surrounding her cancer diagnosis.  She feels she tolerated treatment well so far and now is on the Taxol portion of treatment with 10 more Taxol treatments left through 2/18/21 assuming she handles treatment well.  However as she watches the medical bills come in and knows she has surgery in the future, she has become more emotional and anxious.  She is concerned about total cost of breast cancer treatment and additional costs to come.  Writer shared telephone number for Cost of Care Estimates line and encouraged her to call for more information.      Alice's household income is above the maximum income to qualify for [facility].  [employer] Financial Cancer Care program may be appropriate as a way to confront financial fears.  Writer completed as much of that application as possible and then mailed it to Alice along with a stamped envelope addressed to [employer] [facility] [employer], and the Medical Information Form, signed by writer.     Alice has not connected with [email or web address] but is willing to call and ask for a guide.  Early in her diagnosis she was contacted by a breast cancer survivor, the mother of a former neighbor, who was very supportive.  She will also call [employer], and phone number was shared.    Alice gave permission for Jennifer Fung', oncology psychotherapist,  to call to get an appointment on the books.    Patient recognizes the importance of genetic counseling and potentially genetic testing as she heads toward decisions about surgery.  She has the number for genetic counselor scheduling team and states she will reach out this month.  We discussed the importance of scheduling since the test results can take four or more weeks without a rush order.Molly Tello RN

## 2021-06-13 NOTE — PROGRESS NOTES
"[facility] Clinic Note  Patient Name: Jes Mistry  Patient Age: 42 y.o.  YOB: 1975  MRN: [id number]  ?  Date of Visit: 10/19/2017  Reason for Office Visit:   Chief Complaint   Patient presents with     BP check     Is needing to have Bp checked and medication for Bp.        HPI: Jes Mistry 42 y.o. female who presents to clinic for BP check. She has a history of HTN well controlled on lisinopril-hctz. She checks her BP quarterly or so at home. It usually ranges around low 120s/70s. She is tolerating this medication well. Denies dizziness, fatigue, cp, palpitations, sob, headaches, blurry vision, LE edema.     I ordered a Mammo last year but she has not scheduled the exam yet. I did recommend she get this done. She is complacent about it, stating that she has no family history of breast ca. I stressed that most people don't have a family history who are diagnosed and this is why we screen everyone. She agreed and will schedule her Mammo.     Review of Systems: As noted in HPI     Current Scheduled Meds:  Outpatient Encounter Prescriptions as of 10/19/2017   Medication Sig Dispense Refill     diphenhydrAMINE (BENADRYL) 25 mg capsule Take 25 mg by mouth every 6 (six) hours as needed for itching.       lisinopril-hydrochlorothiazide (PRINZIDE,ZESTORETIC) 10-12.5 mg per tablet Take 1 tablet by mouth daily. 90 tablet 3     [DISCONTINUED] lisinopril-hydrochlorothiazide (PRINZIDE,ZESTORETIC) 10-12.5 mg per tablet Take 1 tablet by mouth daily. 90 tablet 0     No facility-administered encounter medications on file as of 10/19/2017.        Objective / Physical Examination:  /72  Pulse 80  Ht 5' 3.5\" (1.613 m)  Wt 131 lb (59.4 kg)  BMI 22.84 kg/m2  Wt Readings from Last 3 Encounters:   10/19/17 131 lb (59.4 kg)   08/01/16 128 lb (58.1 kg)   06/29/15 132 lb 11.2 oz (60.2 kg)     Body mass index is 22.84 kg/(m^2). (>25?)    General Appearance: Alert and oriented in no acute " distress  Neck: No Thyromegaly   Lungs: Normal inspiratory and expiratory effort  Cardiovascular: RRR S1, S2. No m/r/g   Extremities: No edema.   Integumentary: Warm and dry.  Neuro: Alert and oriented, follows commands appropriately.     Assessment / Plan / Medical Decision Making:      Encounter Diagnoses   Name Primary?     Essential hypertension         1. Essential hypertension  Controlled. No issues. Continue zestoretic. Continue exercise, healthy diet.   Will check a BMP today   - lisinopril-hydrochlorothiazide (PRINZIDE,ZESTORETIC) 10-12.5 mg per tablet; Take 1 tablet by mouth daily.  Dispense: 90 tablet; Refill: 3  - Basic Metabolic Panel    2. HCM  -schedule mammo    Follow up with PCP for annual physical     Total time spent with patient was 15 minutes with >50% of time spent in face-to-face counseling regarding the above plan     Akil Urbina MD  [facility]

## 2021-06-13 NOTE — PROGRESS NOTES
[facility] Hematology and Oncology Progress Note    Patient: Jes Mistry  MRN: [id number]  Date of Service: 11/19/2020        Reason for Visit    Chief Complaint   Patient presents with     HE Cancer     Malignant neoplasm of lower-inner quadrant of right female breast, unspecified estrogen receptor status       Assessment and Plan  Cancer Staging  Malignant neoplasm of lower-inner quadrant of right breast of female, estrogen receptor negative (H)  Staging form: Breast, AJCC 8th Edition  - Clinical: Stage IIB (cT2, cN0, cM0, G3, ER-, ND-, HER2-) - Signed by Kaela Plunkett, CNP on 10/9/2020    1. Breast cancer, stage IIB: pt with triple negative disease. Has started neoadjuvant chemo with dose dense AC.  She will get cycle 4 today. Return in 2 weeks to start Taxol. Clinically is responding.     2.  7 mm pulmonary nodule: This does not have any activity on PET scan so likely benign.  Radiology did recommend a follow-up so we will likely repeat a CT scan in 6 months, roughly April.    3. Mild fatigue, nausea, heartburn: from chemo. Dealing well with them. Encourage activity, small frequent meals/snacks.     4. Microcytic anemia: could be iron deficient. Recently stopped eating meat. Her MCV is improved. Started a small iron supplement and is now eating a little more red meat. She is taking iron every other day.     ECOG Performance   ECOG Performance Status: 1     Distress Assessment  Distress Assessment Score: No distress      Pain  Currently in Pain: No/denies      Problem List    1. Chemotherapy-induced neutropenia (H)  CC OFFICE VISIT LONG    CC OFFICE VISIT LONG    Infusion Appointment    DISCONTINUED: sodium chloride 0.9% 250 mL infusion    DISCONTINUED: palonosetron injection 0.25 mg (ALOXI)    DISCONTINUED: fosaprepitant 150 mg, dexAMETHasone 12 mg in sodium chloride 0.9% 150 mL    DISCONTINUED: DOXOrubicin 100 mg (ADRIAMYCIN)    DISCONTINUED: cyclophosphamide 1,000 mg in sodium chloride 0.9% 250  mL (CYTOXAN)    DISCONTINUED: pegfilgrastim injection 6 mg (NEULASTA DELIVERY KIT)    DISCONTINUED: sodium chloride flush 20 mL (NS)    DISCONTINUED: heparin lockflush (PF) porcine 300-600 Units    DISCONTINUED: diphenhydrAMINE injection 50 mg (BENADRYL)    DISCONTINUED: famotidine 20 mg/2 mL injection 20 mg (PEPCID)    DISCONTINUED: hydrocortisone sod succ (PF) injection 100 mg    DISCONTINUED: acetaminophen tablet 1,000 mg (TYLENOL)    DISCONTINUED: sodium chloride 0.9% 500 mL   2. Malignant neoplasm of lower-inner quadrant of right female breast, unspecified estrogen receptor status (H)  CC OFFICE VISIT LONG    CC OFFICE VISIT LONG    Infusion Appointment    DISCONTINUED: sodium chloride 0.9% 250 mL infusion    DISCONTINUED: palonosetron injection 0.25 mg (ALOXI)    DISCONTINUED: fosaprepitant 150 mg, dexAMETHasone 12 mg in sodium chloride 0.9% 150 mL    DISCONTINUED: DOXOrubicin 100 mg (ADRIAMYCIN)    DISCONTINUED: cyclophosphamide 1,000 mg in sodium chloride 0.9% 250 mL (CYTOXAN)    DISCONTINUED: pegfilgrastim injection 6 mg (NEULASTA DELIVERY KIT)    DISCONTINUED: sodium chloride flush 20 mL (NS)    DISCONTINUED: heparin lockflush (PF) porcine 300-600 Units    DISCONTINUED: diphenhydrAMINE injection 50 mg (BENADRYL)    DISCONTINUED: famotidine 20 mg/2 mL injection 20 mg (PEPCID)    DISCONTINUED: hydrocortisone sod succ (PF) injection 100 mg    DISCONTINUED: acetaminophen tablet 1,000 mg (TYLENOL)    DISCONTINUED: sodium chloride 0.9% 500 mL      ______________________________________________________________________________    History of Present Illness    DIAGNOSIS: breast cancer, right breast. Palpable lump. 24mm. High grade, poorly differentiated carcinoma. ER1%+, DC-HER2/emily- so essentially triple negative. T2N0M0, Stage IIB    TREATMENT: dose dense AC in neoadjuvant fashion.  Today is cycle 4    INTERIM HISTORY: pt is here today to continue chemo. She is doing well.  Having some worsening fatigue    Review  "of Systems    Constitutional  Constitutional (WDL): All constitutional elements are within defined limits  Neurosensory  Neurosensory (WDL): Exceptions to WDL  Peripheral Motor Neuropathy: Concerns(1 episode of numbness in fingertips)  Eye   Eye Disorder (WDL): Exceptions to WDL  Blurred Vision: Concerns(stable)  Ear  Ear Disorder (WDL): All ear disorder elements are within defined limits  Cardiovascular  Cardiovascular (WDL): All cardiovascular elements are within defined limits  Pulmonary  Respiratory (WDL): Within Defined Limits  Gastrointestinal  Gastrointestinal (WDL): All gastrointestinal elements are within defined limits  Genitourinary  Genitourinary (WDL): All genitourinary elements are within defined limits  Lymphatic  Lymph (WDL): All lymph disorder elements are within defined limits  Musculoskeletal and Connective Tissue  Musculoskeletal and Connetive Tissue Disorders (WDL): All Musculoskeletal and Connetive Tissue Disorder elements are within defined limits  Integumentary  Integumentary (WDL): All integumentary elements are within defined limits  Patient Coping  Patient Coping: Accepting;Open/discussion  Accompanied by  Accompanied by: Alone  Oral Chemo Adherence           Past History  Past Medical History:   Diagnosis Date     History of anesthesia complications     Slow to wake up     HTN (hypertension)      Nonsmoker      Thyroid nodule 2011       PHYSICAL EXAM  /73   Pulse 61   Temp 97.8  F (36.6  C) (Oral)   Ht 5' 3\" (1.6 m)   Wt 124 lb 4.8 oz (56.4 kg)   SpO2 100%   BMI 22.02 kg/m      GENERAL: no acute distress. Cooperative in conversation. Here alone due to visitor restrictions. Mask on  RESP: Regular respiratory rate. No expiratory wheezes   MUSCULOSKELETAL: no bilateral leg swelling  NEURO: non focal. Alert and oriented x3.   PSYCH: within normal limits. No depression or anxiety.  SKIN: exposed skin is dry intact.   BREAST: small tumor still palpable. Probably about 5mm    Lab " Results    Recent Results (from the past 168 hour(s))   Comprehensive Metabolic Panel   Result Value Ref Range    Sodium 141 136 - 145 mmol/L    Potassium 4.1 3.5 - 5.0 mmol/L    Chloride 104 98 - 107 mmol/L    CO2 27 22 - 31 mmol/L    Anion Gap, Calculation 10 5 - 18 mmol/L    Glucose 91 70 - 125 mg/dL    BUN 14 8 - 22 mg/dL    Creatinine 0.68 0.60 - 1.10 mg/dL    GFR MDRD Af Amer >60 >60 mL/min/1.73m2    GFR MDRD Non Af Amer >60 >60 mL/min/1.73m2    Bilirubin, Total 0.2 0.0 - 1.0 mg/dL    Calcium 9.3 8.5 - 10.5 mg/dL    Protein, Total 6.8 6.0 - 8.0 g/dL    Albumin 4.2 3.5 - 5.0 g/dL    Alkaline Phosphatase 80 45 - 120 U/L    AST 15 0 - 40 U/L    ALT 18 0 - 45 U/L   HM1 (CBC with Diff)   Result Value Ref Range    WBC 9.0 4.0 - 11.0 thou/uL    RBC 3.90 3.80 - 5.40 mill/uL    Hemoglobin 10.5 (L) 12.0 - 16.0 g/dL    Hematocrit 32.1 (L) 35.0 - 47.0 %    MCV 82 80 - 100 fL    MCH 26.9 (L) 27.0 - 34.0 pg    MCHC 32.7 32.0 - 36.0 g/dL    RDW 22.5 (H) 11.0 - 14.5 %    Platelets 164 140 - 440 thou/uL    MPV 10.1 8.5 - 12.5 fL   Manual Differential   Result Value Ref Range    Total Neutrophils % 82 (H) 50 - 70 %    Lymphocytes % 5 (L) 20 - 40 %    Monocytes % 6 2 - 10 %    Eosinophils %  0 0 - 6 %    Basophils % 0 0 - 2 %    Metamyelocytes % 1 <=1 %    Myelocytes % 6 (H) <=1 %    Immature Granulocyte % - Manual 7 (H) <=0 %    Total Neutrophils Absolute 7.4 2.0 - 7.7 thou/ul    Lymphocytes Absolute 0.5 (L) 0.8 - 4.4 thou/uL    Monocytes Absolute 0.5 0.0 - 0.9 thou/uL    Eosinophils Absolute 0.0 0.0 - 0.4 thou/uL    Basophils Absolute 0.0 0.0 - 0.2 thou/uL    Metamyelocytes Absolute 0.1 <=0.1 thou/uL    Myelocytes Absolute 0.5 (H) <=0.1 thou/uL    Immature Granulocyte Absolute - Manual 0.6 (H) <=0.0 thou/uL    Platelet Estimate Normal Normal    Ovalocytes 1+ (!) Negative    Polychromasia 1+ (!) Negative    Tear Drop Cells 1+ (!) Negative       Imaging    No results found.        Signed by: Kaela Plunkett, CNP

## 2021-06-13 NOTE — TELEPHONE ENCOUNTER
Telephoned and left voice message for Alice requesting call back to check in and inquire about her needs. Molly Tello RN

## 2021-06-13 NOTE — PROGRESS NOTES
Pt here for treatment. Port accessed easily and labs obtained. No problem with infusion as directed. Pt denies complaint and port flushed and deaccessed upon completion. Pt chemo.c ambulatory to lobby alone and is aware of future appointments.

## 2021-06-13 NOTE — PROGRESS NOTES
Pt ambulates to infusion center for lab draw prior to NP visit.  IVAD accessed, labs drawn et sent.  Pt was seen by CARTER Plunkett CNP today and orders were approved.  Treatment administered as ordered without difficulty.  IVAD flushed w/NS et heparin and needle deaccessed.  Pt left clinic stable to lobby.  Plan RTC as scheduled.

## 2021-06-14 ENCOUNTER — COMMUNICATION - HEALTHEAST (OUTPATIENT)
Dept: ONCOLOGY | Facility: HOSPITAL | Age: 46
End: 2021-06-14

## 2021-06-14 NOTE — PROGRESS NOTES
[facility] psychology Psychotherapy  Note-telephone visit    Name:  Jes Mistry  :  1975  MRN:  [id number]      Date of Service: 2021  Duration: 60 minutes (2:00-3:00 PM)    The patient has been notified of following:      This telephone visit will be conducted via a call between you and your provider. We have found that certain health care needs can be provided without a face to face meeting.  This service lets us provide the care you need with a short phone conversation.      Telephone visits are billed at different rates depending on your insurance coverage. During this emergency period, for some insurers they may be billed the same as an in-person visit.  Please reach out to your insurance provider with any questions.     If during the course of the call the if provider feels a telephone visit is not appropriate, you will not be charged for this service.     Patient has given verbal consent to a Telephone visit? Yes    Target Symptoms:    The patient was seen in light of concerns regarding symptoms of anxiety and depression related to her cancer and cancer treatment as evidenced by patient and staff report.    Participation:  The patient was able to participate and benefit from treatment as evidenced by her verbal expression of ideas and initiation of topics discussed.    Mental Status:    Mood:  anxious and sad  Affect:  mood congruent  Suicidal Ideation:  absent  Homicidal Ideation:  absent  Thought process:  normal  Thought content:  Normal  Fund of Knowledge:  Sufficient  Attention/Concentration:  intact  Language ability:  intact  Speech: normal  Memory:  recent and remote memory intact  Insight and Judgement:  good  Orientation:  person, place, time and situation  Appearance: N/A-telephone visit  Eye Contact: N/A-telephone visit  Estimated IQ:  Average      Intervention:    Alice was referred to me by Veronica Davidson CNP after a recent oncology visit where her distress score was 4.   "Due to COVID-19 and the need for social distancing, Alice agreed to meet via a telephone visit.    Alice shared the story of her cancer diagnosis and treatment. In her monthly self breast exam in August, she noticed a lump in her breast and contacted her OB/GYN.  She had a mammogram 2 weeks later.  She then met with Dr. Will and had surgery to remove the lump. She was diagnosed on 10/2/2020 with malignant neoplasm of the lower inner quadrant of the right female breast, triple negative.  She completed neoadjuvant chemotherapy with dose dense AC.  She now is receiving weekly Taxol and has 6 more weeks of this treatment.  She is scheduled to meet with Dr. Olsen on 12/12/2021.  She also has a consultation with our genetic counselor on 1/13/2021.  She indicates that she has a minimal family history of cancer.  She states that her maternal grandfather had colon cancer.  She does not have any cancer that she is aware of on her mother's side.  She is thinking about having a bilateral mastectomy.    We spent time discussing the various aspects of her treatment and how she is coping.  She processed her thoughts and feelings and also asked questions about the various aspects of her treatment.  She reached out to the [facility] to [facility] last week and is being matched with a survivor with a similar diagnosis.      Alice describes herself as being a \"very rooted and solid person\".  She does not have any prior history of anxiety, depression or other mental health issues.  She also does not have an addiction history.  She denies suicidal ideation.  She is experiencing some normal symptoms of anxiety and depression related to her cancer diagnosis and the various aspects of her treatment.  She also has some additional family stress within this year as they moved with him this year.  Alice meets diagnostic criteria for adjustment disorder with mixed emotional features of anxiety and depression.    Alice grew up in the [facility] "of [place].  Her parents live in [place].  She states that her father is in and out of recovery as an alcoholic.  She has had some very difficult conversations with him during her cancer journey.  Her mother works as 1/5  in [place].  Alice has 2 brothers, one lives in [place] and the other in [place].  She is not close with her brothers.  The family had a falling out with a brother that lives in [place] a number of years ago and they are slowly trying to reestablish that relationship.  Alice indicates that she is much closer to her brother-in-law's into her own brothers.    Alice met her , and in their first year of college.  They started dating when they were 25 and 26 and  when they were 28 and 29.  She describes them as being \"awesome\".  They are celebrating their 17th anniversary in February.  He works as a  for a [employer] company in [place] [place] [facility].  She states that he has been very supportive and helpful to her during her cancer journey and throughout their marriage.    Alice and Harry have 1 son, Ming, age 10.  He attends [employer] in [place].  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  She has been working about 30 hours a week since her cancer journey.  She prefers to go into the office, versus working at home.    Alice and her family recently moved into the [place] home [place]  grew [place]  [place]  Her in-laws decided that they wanted to live in a 1 level home and they are living across the street.  They lived together for a while before her in-laws home was completed.  They sold their house in [facility] over memorial weekend.  Their in-laws moved into their home across the street in September 2020.  Alice has a very close relationship with her in-laws.  She also has a close relationship with her brother-in-law's.  They are very " happy to have made the move, although it added extra stress as they work to have it all come together.    Alice and her family are Muslim.  Their paolo is very important to them.  She feels that by keeping her paolo as her focus has made a difference in her coping and dealing with her cancer journey.  They are members [facility].    Alice is interested in ongoing psychotherapy to help her cope with the emotional aspects of her cancer and cancer treatment.  She was very open at sharing her thoughts and feelings and open to discuss strategies to help her cope.    Psychoterapeutic Techniques:  Cognitive-behavioral therapy, motivational interviewing and supportive psychotherapy strategies were utilized.    Necessity:    The session was necessary for the care of the patient to address symptoms of anxiety and sadness related to the patient's medical condition.    Progress:    Alice openly shared about her cancer diagnosis and treatment.  She openly processed her thoughts and feelings and was interested in discussing various aspects of her treatment and some of the decisions that she is needing to make along the way.  We discussed ongoing strategies to help her cope with the emotional aspects of her cancer.  She is an extremely pleasant and optimistic person.  She is interested in continuing to meet for ongoing support and assistance throughout her cancer journey.    Plan:    Nae is interested in ongoing support and assistance to help her cope with the emotional aspects of her cancer.  We will meet again on 1/26/2021 at 2:00 PM via telephone visit.    Diagnosis:    1.  Adjustment disorder with mixed emotional features of anxiety and depression    2.  Malignant neoplasm of the lower inner quadrant of the right female breast      Problem List:  Patient Active Problem List   Diagnosis     HTN (hypertension)     Nonsmoker     Malignant neoplasm of lower-inner quadrant of right female breast, unspecified  estrogen receptor status (H)     Chemotherapy-induced neutropenia (H)     Malignant neoplasm of lower-inner quadrant of right breast of female, estrogen receptor negative (H)       Provider: Jennifer Fung MA, LP, [facility]    Date:  1/12/2021  Time:  8:38 AM

## 2021-06-14 NOTE — TELEPHONE ENCOUNTER
Telephoned and spoke with Alice to check in and coordinate return to see Dr. Will as she nears the end of neoadjuvant chemotherapy.  Alice is on schedule to receive her final dose of Taxol on 2/18/21.  She is having genetic counseling tomorrow and result availability if she proceeds with testing is to be determined but hopefully by first week in February.      Alice has not made her final decision about surgery but is leaning toward bilateral mastectomies with no construction.  She does have the list of plastic surgeons Dr. Will works with and has not had consult.  Reminded her a consult to just discuss options is appropriate and coordination of surgeons' schedules will be a factor in when her surgery will take place.      We scheduled her to return for surgery follow up with Dr. Will on 2/5/2021. Molly Tello RN

## 2021-06-14 NOTE — PROGRESS NOTES
[facility] Progress Note    Patient: Jes Mistry  MRN: [id number]  Date of Service: 01/12/2021      Assessment and Plan:    Cancer Staging  Malignant neoplasm of lower-inner quadrant of right breast of female, estrogen receptor negative (H)  Staging form: Breast, AJCC 8th Edition  - Clinical: Stage IIB (cT2, cN0, cM0, G3, ER-, SD-, HER2-) - Signed by Kaela Plunkett CNP on 10/9/2020    1.  Invasive ductal carcinoma: Overall she is doing okay.  We will continue chemotherapy at the current doses.  She has not needed the dose reduction.  We will plan for 12 doses followed by surgery.  Questions were answered.  Monitor closely for neuropathy.      ECOG Performance   ECOG Performance Status: 0    Distress Assessment  Distress Assessment Score: 2    Pain  Currently in Pain: No/denies    Diagnosis:    1.  Carcinoma of the right breast: High-grade.  6 o'clock position.  Diagnosed September 2020.  Estrogen receptor positive in 1%.  SD and HER-2 negative.  Measured 24 x 13 x 19 mm on ultrasound.  Ultrasound of the axilla was negative.    Treatment:    Adriamycin and Cytoxan started October 9, 2020.    Interim History:    Alice returns today for follow-up visit.  She has been receiving neoadjuvant chemotherapy.  Currently receiving weekly Taxol.  Overall tolerating okay.  He is having some hand edema.  A little bit of neuropathy from 1 to 2 days after each treatment.  No acute complaints today.    Review of Systems:    Constitutional  Constitutional (WDL): Exceptions to WDL  Fatigue: Fatigue relieved by rest  Neurosensory  Neurosensory (WDL): Exceptions to WDL  Ataxia: Asymptomatic, clinical or diagnostic observations only, intervention not indicated  Peripheral Sensory Neuropathy: Asymptomatic, loss of deep tendon reflexes or paresthesia  Cardiovascular  Cardiovascular (WDL): Exceptions to WDL  Edema: Yes(improved)  Pulmonary  Respiratory (WDL): Within Defined  Limits  Gastrointestinal  Gastrointestinal (WDL): Exceptions to WDL  Diarrhea: Increase of <4 stools per day over baseline, mild increase in ostomy output compared to baseline  Dysgeusia: Altered taste but no change in diet  Genitourinary  Genitourinary (WDL): All genitourinary elements are within defined limits  Integumentary  Integumentary (WDL): Exceptions to WDL  Alopecia: Hair loss of >50% normal for that individual that is readily apparent to others, a wig or hair piece is necessary if the patient desires to completely camouflage the hair loss, associated with psychosocial impact  Patient Coping  Patient Coping: Accepting  Accompanied by  Accompanied by: Alone    Past History:    Past Medical History:   Diagnosis Date     History of anesthesia complications     Slow to wake up     HTN (hypertension)      Nonsmoker      Thyroid nodule 2011     Physical Exam:    Recent Vitals 1/12/2021   Height -   Weight 125 lbs   BSA (m2) 1.59 m2   /70   Pulse 77   Temp 98.3   Temp src 1   SpO2 98   Some recent data might be hidden     General: patient appears stated age of 45 y.o.. Nontoxic and in no distress.   HEENT: Head: atraumatic, normocephalic. Sclerae anicteric.  Chest:  Normal respiratory effort  Cardiac:  No edema.   Abdomen: abdomen is non-distended  Extremities: normal tone and muscle bulk.  Skin: no lesions or rash. Warm and dry.   CNS: alert and oriented. Grossly non-focal.   Psychiatric: normal mood and affect.     Lab Results:    Recent Results (from the past 168 hour(s))   HM1 (CBC with Diff)   Result Value Ref Range    WBC 4.0 4.0 - 11.0 thou/uL    RBC 4.11 3.80 - 5.40 mill/uL    Hemoglobin 12.7 12.0 - 16.0 g/dL    Hematocrit 37.3 35.0 - 47.0 %    MCV 91 80 - 100 fL    MCH 30.9 27.0 - 34.0 pg    MCHC 34.0 32.0 - 36.0 g/dL    RDW 19.1 (H) 11.0 - 14.5 %    Platelets 265 140 - 440 thou/uL    MPV 9.7 8.5 - 12.5 fL    Neutrophils % 68 50 - 70 %    Lymphocytes % 21 20 - 40 %    Monocytes % 8 2 - 10 %     Eosinophils % 3 0 - 6 %    Basophils % 1 0 - 2 %    Immature Granulocyte % 1 (H) <=0 %    Neutrophils Absolute 2.7 2.0 - 7.7 thou/uL    Lymphocytes Absolute 0.8 0.8 - 4.4 thou/uL    Monocytes Absolute 0.3 0.0 - 0.9 thou/uL    Eosinophils Absolute 0.1 0.0 - 0.4 thou/uL    Basophils Absolute 0.0 0.0 - 0.2 thou/uL    Immature Granulocyte Absolute 0.0 <=0.0 thou/uL   Comprehensive Metabolic Panel   Result Value Ref Range    Sodium 139 136 - 145 mmol/L    Potassium 4.0 3.5 - 5.0 mmol/L    Chloride 104 98 - 107 mmol/L    CO2 26 22 - 31 mmol/L    Anion Gap, Calculation 9 5 - 18 mmol/L    Glucose 155 (H) 70 - 125 mg/dL    BUN 13 8 - 22 mg/dL    Creatinine 0.76 0.60 - 1.10 mg/dL    GFR MDRD Af Amer >60 >60 mL/min/1.73m2    GFR MDRD Non Af Amer >60 >60 mL/min/1.73m2    Bilirubin, Total 0.7 0.0 - 1.0 mg/dL    Calcium 9.7 8.5 - 10.5 mg/dL    Protein, Total 7.5 6.0 - 8.0 g/dL    Albumin 4.4 3.5 - 5.0 g/dL    Alkaline Phosphatase 58 45 - 120 U/L    AST 21 0 - 40 U/L    ALT 34 0 - 45 U/L   HM1 (CBC with Diff)   Result Value Ref Range    WBC 3.8 (L) 4.0 - 11.0 thou/uL    RBC 4.10 3.80 - 5.40 mill/uL    Hemoglobin 12.9 12.0 - 16.0 g/dL    Hematocrit 37.7 35.0 - 47.0 %    MCV 92 80 - 100 fL    MCH 31.5 27.0 - 34.0 pg    MCHC 34.2 32.0 - 36.0 g/dL    RDW 17.6 (H) 11.0 - 14.5 %    Platelets 308 140 - 440 thou/uL    MPV 9.6 8.5 - 12.5 fL    Neutrophils % 72 (H) 50 - 70 %    Lymphocytes % 20 20 - 40 %    Monocytes % 5 2 - 10 %    Eosinophils % 2 0 - 6 %    Basophils % 1 0 - 2 %    Immature Granulocyte % 0 <=0 %    Neutrophils Absolute 2.8 2.0 - 7.7 thou/uL    Lymphocytes Absolute 0.8 0.8 - 4.4 thou/uL    Monocytes Absolute 0.2 0.0 - 0.9 thou/uL    Eosinophils Absolute 0.1 0.0 - 0.4 thou/uL    Basophils Absolute 0.0 0.0 - 0.2 thou/uL    Immature Granulocyte Absolute 0.0 <=0.0 thou/uL     Imaging:    No results found.      Signed by: Elia Olsen MD

## 2021-06-14 NOTE — PROGRESS NOTES
"1/13/2021     Jes Mistry is a 45 y.o. female who is being evaluated via a billable telephone visit.      The patient has been notified of following:     \"This telephone visit will be conducted via a call between you and your physician/provider. We have found that certain health care needs can be provided without the need for a physical exam.  This service lets us provide the care you need with a short phone conversation.  If a prescription is necessary we can send it directly to your pharmacy.  If lab work is needed we can place an order for that and you can then stop by our lab to have the test done at a later time.    Telephone visits are billed at different rates depending on your insurance coverage. During this emergency period, for some insurers they may be billed the same as an in-person visit.  Please reach out to your insurance provider with any questions.    If during the course of the call the physician/provider feels a telephone visit is not appropriate, you will not be charged for this service.\"    Patient has given verbal consent to a Telephone visit? Yes    What phone number would you like to be contacted at? [phone]    Patient would like to receive their AVS by AVS Preference: [place].    Referring Provider: Cindy Michael,*    Present for Today's Visit: Jes    Presenting Information:   I spoke with Jes Mistry over the phone today for genetic counseling to discuss her personal history of breast cancer and family history of gastrointestinal cancer.  She is here today to review this history, cancer screening recommendations, and available genetic testing options.    Personal History:  Jes is a 45 y.o. female. She was diagnosed with a right breast cancer in September 2020 due to lump that she picked up during a self-breast exam. Biopsy completed on 9/29/2020 revealed a high grade poorly differentiated carcinoma compatible breast primary; estrogen negative, " progesterone negative, and HER2 negative. She has undergone neoadjuvant chemotherapy and is meeting with Dr. Will to discuss surgical options on 2/5/2021. Alice stated that the results from genetic testing will help determine whether bilateral mastectomy is the right surgical option for her.    She also reports a history of high blood pressure, a benign thyroid nodule, and a lipoma resection from the back of the neck.      She had her first menstrual period at age 11, her first child at age 35, and reports that she is premenopausal.  Jes has her ovaries, fallopian tubes and uterus in place, and she has had no ovarian cancer screening to date.  She reports no history of oral contraceptive use and that she has never been on hormone replacement therapy.      Her most recent OB-GYN exam and Pap smear in 2016 were normal.  She has annual clinical breast exams and mammograms. Her breast density has been reported as extremely dense.  She has not yet had a colonoscopy. She does not regularly do any other cancer screening at this time.  Jes reported no tobacco use, and no alcohol use.    Family History: Alice's family history is significant for the following (Please see scanned pedigree for detailed family history information)    Alice has one son, age 10, who she reports has frequent nosebleeds but is otherwise healthy.    Alice has two brothers, ages 40 and 43, both healthy with no cancer history.     Alice's mother, age 65, has no reported cancer history.     Alice has a maternal half-uncle (her mother's paternal half-sister), age 53, was diagnosed with non-Hodgkin's lymphoma diagnosed at age 32.     Alice's maternal grandmother passed away at age 74 from a gastrointestinal cancer diagnosed at age 73. Alice reports that she believes it may have been a colon cancer. Alice also reports that her grandmother may have been diagnosed with another type of cancer at age 63.     Alice's maternal grandfather passed away  at age 70 from pancreatic cancer diagnosed shorly before he passed.     Alice reports that her maternal grandmother's mother had a history of lung cancer. She was a smoker.     No cancer history reported in her maternal uncle and her maternal first-cousins.     Alice's father, age 65, has no reported cancer history.    No cancer history reported in four paternal aunts, four paternal uncles, paternal first-cousins, and paternal grandparents.     Her maternal ethnicity is [place], [place], and [place]. Her paternal ethnicity is Polish, [place]ian, and English.  There is no known Ashkenazi Rastafari ancestry on either side of her family. There is no reported consanguinity.    Discussion:    Jes's personal history of breast cancer and family history of pancreatic cancer is suggestive of a hereditary cancer syndrome.    We reviewed the features of sporadic, familial, and hereditary cancers. In looking at Jes's family history, it is possible that a cancer susceptibility gene is present due to her early-onset triple negative breast cancer and her paternal grandfather's pancreatic cancer.    We discussed the natural history and genetics of hereditary cancer. A detailed handout regarding the information we discussed will be sent to Jes via Storie. Topics included: inheritance pattern, cancer risks, cancer screening recommendations, and also risks, benefits and limitations of testing.    We reviewed that the most common cause of hereditary breast cancer is Hereditary Breast and Ovarian Cancer (HBOC) syndrome, which is caused by mutations in the genes BRCA1 and BRCA2. BRCA1 and BRCA2 are two genes that increase the risk for breast and ovarian cancers, among others. Women who inherit a BRCA mutation have a 50 to 85% lifetime risk of breast cancer and up to a 40-58% lifetime risk of ovarian cancer. This is higher than the general population lifetime risks of 12% for breast cancer and less than 2% for ovarian cancer. Men  with BRCA gene mutations have up to a 7% risk of breast cancer and 20% risk of prostate cancer. Other cancers, such as pancreatic cancer and melanoma, have also been associated with BRCA mutations.    Based on her personal and family history, Jes meets current National Comprehensive Cancer Network (NCCN) criteria for genetic testing of high-penetrance breast and/or ovarian cancer susceptibility genes.      We discussed that there are additional genes that could cause increased risk for breast and/or gastrointestinal cancer. As many of these genes present with overlapping features in a family and accurate cancer risk cannot always be established based upon the pedigree analysis alone, it would be reasonable for Jes to consider panel genetic testing to analyze multiple genes at once.    We discussed genetic testing options for Alice given her personal and family history. We discussed including genes associated with breat cancer risk (BRCANext-Expanded) and adding genes associated with gastrointestinal cancer risk given her maternal grandparents' histories (CancerNext). After our discussion, Alice opted to proceed with CancerNext genetic testing through [employer] [facility].  Genetic testing is available for 36 genes associated with hereditary cancer: CancerNext (APC, DAGO, AXIN2, BARD1, BMPR1A, BRCA1, BRCA2, BRIP1, CDH1, CDK4, CDKN2A, CHEK2, DICER1, EPCAM, GREM1, HOXB13, MLH1, MSH2, MSH3, MSH6, MUTYH, NBN, NF1, NTHL1, PALB2, PMS2, POLD1, POLE, PTEN, RAD51C, RAD51D, RECQL, SMAD4, SMARCA4, STK11, and TP53).  We discussed that many of the genes in the CancerNext panel are associated with specific hereditary cancer syndromes and published management guidelines: Hereditary Breast and Ovarian Cancer syndrome (BRCA1, BRCA2), Duarte syndrome (MLH1, MSH2, MSH6, PMS2, EPCAM), Familial Adenomatous Polyposis (APC), Hereditary Diffuse Gastric Cancer (CDH1), Familial Atypical Multiple Mole Melanoma syndrome (CDK4, CDKN2A),  Juvenile Polyposis syndrome (BMPR1A, SMAD4), Cowden syndrome (PTEN), Li Fraumeni syndrome (TP53), Peutz-Jeghers syndrome (STK11), MUTYH Associated Polyposis (MUTYH), and Neurofibromatosis type 1 (NF1).   The DAGO, AXIN2, BRIP1, CHEK2, GREM1, MSH3, NBN, NTHL1, PALB2, POLD1, POLE, RAD51C, and RAD51D genes are associated with increased cancer risk and have published management guidelines for certain cancers.    The remaining genes (BARD1, DICER1, HOXB13, RECQL, and SMARCA4) are associated with increased cancer risk and may allow us to make medical recommendations when mutations are identified.      Consent was obtained over the phone with no witness required due to the current covid19 global pandemic.    Medical Management: For Jes, we reviewed that the information from genetic testing may determine:    surgery to treat Jes's active cancer diagnosis (i.e. lumpectomy versus bilateral mastectomy, partial versus total colectomy, etc.),    additional cancer screening for which Jes may qualify (i.e. mammogram and breast MRI, more frequent colonoscopies, more frequent dermatologic exams, etc.),    options for risk reducing surgeries Jes could consider (i.e. bilateral mastectomy, surgery to remove her ovaries and/or uterus, etc.),      and targeted chemotherapies for Jes's active cancer, or if she were to develop certain cancers in the future (i.e. immunotherapy for individuals with Duarte syndrome, PARP inhibitors, etc.).     These recommendations and possible targeted chemotherapies will be discussed in detail once genetic testing is completed.     We discussed that Jes's surgical decisions are pending genetic testing results. She has a surgical consult scheduled for 2/05/2021 with Dr. Will. For that reason, I will place a STAT request on the BRCAPlus genes (DAGO, BRCA1, BRCA2, CDH1, CHEK2, PALB2, PTEN, and TP53) in order to get these results back as soon as possible.     We discussed options for  getting her blood drawn. Alice was encouraged to consider coming in as soon as she could to get her blood drawn for the genetic testing. She opted to wait until her upcoming infusion on 1/21/2021.     Plan:  1) Today Jes  elected to proceed with CancerNext genetic testing (36 genes) through [employer]. Her blood will be drawn for this genetic testing at her upcoming infusion appointment on 1/21/2021.  2) A STAT request was placed on the BRCAPlus genes (including BRCA1 and BRCA2) and we will get these results back in 7-10 days. I will call Jes with these results as soon as they are available.     Time spent over the phone: 68 minutes    Rosa Arreola MS, [facility] [place]  Licensed Genetic Counselor  [facility]  [phone]

## 2021-06-14 NOTE — PROGRESS NOTES
Pt arrived ambulatory to clinic for Cycle # 6 Day # 1 of her chemotherapy regimen.  Port was accessed using aseptic technique without difficulties with excellent blood return.  Applied skin protectant to site since pt does get a little red from the Tegaderm dressing.  Administered premedications and chemotherapy per MD order.  Pt tolerated infusion well, no s/s of infusion reaction.  Port was flushed with NS and Heparin then de-accessed using 2x2 and papertape.  Pt verbalized understanding of plan of care and return to clinic.

## 2021-06-14 NOTE — PROGRESS NOTES
Pt arrived ambulatory to clinic for Cycle # 6 Day # 8 of her chemotherapy regimen.  Port was accessed using aseptic technique without difficulties with excellent blood return.  Pt states that her hands swell up 3 days after her dose of Taxol and this lasts several days.  Instructed pt to try to ice hands 4 times per day no longer than 20 mins per time.  Pt can start that tonight to see if that helps with the swelling.  Administered premedications and chemotherapy per MD order.  Pt tolerated infusion well, no s/s of infusion reaction.  Port was flushed with NS and Heparin then de-accessed using 2x2 and papertape.  Pt verbalized understanding of plan of care and return to clinic.

## 2021-06-14 NOTE — PROGRESS NOTES
[facility] Hematology and Oncology Progress Note    Patient: Jes Mistry  MRN: [id number]  Date of Service: 12/23/2020        Reason for Visit    Breast cancer, on neoadjuvant chemo    Assessment and Plan  Cancer Staging  Malignant neoplasm of lower-inner quadrant of right breast of female, estrogen receptor negative (H)  Staging form: Breast, AJCC 8th Edition  - Clinical: Stage IIB (cT2, cN0, cM0, G3, ER-, RI-, HER2-) - Signed by Kaela Plunkett CNP on 10/9/2020    1. Breast cancer, stage IIB, triple negative:   Alice has completed neoadjuvant chemo with dose dense AC and is now on weekly Taxol. She's completed 3 weeks and is tolerating it generally well. Labwork is satisfactory.    Mass continues to respond to treatment. Tumor is quite small.     Continue with next 3 weeks of Taxol at same dose.  She will see Dr. Olsen in 3 weeks.     2.  7 mm pulmonary nodule:   This does not have any activity on PET scan so likely benign.  Radiology did recommend a follow-up so we will likely repeat a CT scan in 6 months, roughly April.    3. Genetic testing:  Given young age, triple negative disease it has been recommended to meet with genetic counselor and consider testing. Up front, she was overwhelmed with everything so deferred this but is planning to follow-up on scheduling.  She is leaning towards mastectomy either way.    4. Microcytic anemia, resolved:   Could be iron deficient and improved on oral iron.     ECOG Performance   ECOG Performance Status: 1     Distress Assessment  Distress Assessment Score: 4      Pain  Currently in Pain: No/denies      ______________________________________________________________________________    History of Present Illness    DIAGNOSIS: breast cancer, right breast. Palpable lump. 24mm. High grade, poorly differentiated carcinoma. ER1%+, RI-HER2/emily- so essentially triple negative. T2N0M0, Stage IIB    TREATMENT: dose dense AC in neoadjuvant fashion x 4 cycles. On weekly  "Taxol, due for week 4 today.    INTERIM HISTORY:   Alice is here today to continue chemo, she is due for week 4 Taxol.   She is doing well. Mild fatigue. No nausea. Hand swelling each cycle. Transient peripheral neuropathy fingers one day after each cycle, then resolves. No fevers.    Review of Systems    Constitutional  Constitutional (WDL): Exceptions to WDL  Fatigue: Concerns  Hot flashes/Night Sweats: Concerns(intermittently)  Neurosensory  Neurosensory (WDL): Exceptions to WDL  Peripheral Sensory Neuropathy: Concerns(fingertips)  Eye   Eye Disorder (WDL): Exceptions to WDL  Blurred Vision: Concerns  Watering Eyes: Concerns  Ear  Ear Disorder (WDL): All ear disorder elements are within defined limits  Cardiovascular  Cardiovascular (WDL): Exceptions to WDL  Edema: Concerns(post treatment-hands)  Pulmonary  Respiratory (WDL): Within Defined Limits  Gastrointestinal  Gastrointestinal (WDL): Exceptions to WDL  Dysgeusia: Concerns(greasy foods)  Diarrhea: Concerns(looser stools)  Genitourinary  Genitourinary (WDL): All genitourinary elements are within defined limits  Lymphatic  Lymph (WDL): All lymph disorder elements are within defined limits  Musculoskeletal and Connective Tissue  Musculoskeletal and Connetive Tissue Disorders (WDL): All Musculoskeletal and Connetive Tissue Disorder elements are within defined limits  Integumentary  Integumentary (WDL): All integumentary elements are within defined limits  Patient Coping  Patient Coping: Accepting;Open/discussion  Accompanied by  Accompanied by: Alone  Oral Chemo Adherence           Past History  Past Medical History:   Diagnosis Date     History of anesthesia complications     Slow to wake up     HTN (hypertension)      Nonsmoker      Thyroid nodule 2011       PHYSICAL EXAM  /74   Pulse 80   Temp 98.1  F (36.7  C) (Oral)   Ht 5' 3\" (1.6 m)   Wt 126 lb 8 oz (57.4 kg)   SpO2 98%   BMI 22.41 kg/m      GENERAL: no acute distress. Cooperative in " conversation. Here alone due to visitor restrictions. Alopecia. Mask on  RESP: Regular respiratory rate. No expiratory wheezes   MUSCULOSKELETAL: no bilateral leg swelling. Mild edema bilateral hands.  NEURO: non focal. Alert and oriented x3.   PSYCH: within normal limits. No depression or anxiety.  SKIN: exposed skin is dry intact.   BREAST: small right lower quadrant tumor still palpable, about 3-5mm    Lab Results    Recent Results (from the past 168 hour(s))   HM1 (CBC with Diff)   Result Value Ref Range    WBC 4.4 4.0 - 11.0 thou/uL    RBC 4.15 3.80 - 5.40 mill/uL    Hemoglobin 11.9 (L) 12.0 - 16.0 g/dL    Hematocrit 36.2 35.0 - 47.0 %    MCV 87 80 - 100 fL    MCH 28.7 27.0 - 34.0 pg    MCHC 32.9 32.0 - 36.0 g/dL    RDW      Platelets 274 140 - 440 thou/uL    MPV 9.3 8.5 - 12.5 fL    Neutrophils % 75 (H) 50 - 70 %    Lymphocytes % 15 (L) 20 - 40 %    Monocytes % 6 2 - 10 %    Eosinophils % 2 0 - 6 %    Basophils % 1 0 - 2 %    Immature Granulocyte % 1 (H) <=0 %    Neutrophils Absolute 3.3 2.0 - 7.7 thou/uL    Lymphocytes Absolute 0.7 (L) 0.8 - 4.4 thou/uL    Monocytes Absolute 0.3 0.0 - 0.9 thou/uL    Eosinophils Absolute 0.1 0.0 - 0.4 thou/uL    Basophils Absolute 0.0 0.0 - 0.2 thou/uL    Immature Granulocyte Absolute 0.0 <=0.0 thou/uL   Comprehensive Metabolic Panel   Result Value Ref Range    Sodium 140 136 - 145 mmol/L    Potassium 4.0 3.5 - 5.0 mmol/L    Chloride 104 98 - 107 mmol/L    CO2 29 22 - 31 mmol/L    Anion Gap, Calculation 7 5 - 18 mmol/L    Glucose 136 (H) 70 - 125 mg/dL    BUN 14 8 - 22 mg/dL    Creatinine 0.65 0.60 - 1.10 mg/dL    GFR MDRD Af Amer >60 >60 mL/min/1.73m2    GFR MDRD Non Af Amer >60 >60 mL/min/1.73m2    Bilirubin, Total 0.6 0.0 - 1.0 mg/dL    Calcium 9.2 8.5 - 10.5 mg/dL    Protein, Total 7.2 6.0 - 8.0 g/dL    Albumin 4.3 3.5 - 5.0 g/dL    Alkaline Phosphatase 59 45 - 120 U/L    AST 28 0 - 40 U/L    ALT 44 0 - 45 U/L   HM1 (CBC with Diff)   Result Value Ref Range    WBC 3.3 (L)  4.0 - 11.0 thou/uL    RBC 4.03 3.80 - 5.40 mill/uL    Hemoglobin 12.0 12.0 - 16.0 g/dL    Hematocrit 35.4 35.0 - 47.0 %    MCV 88 80 - 100 fL    MCH 29.8 27.0 - 34.0 pg    MCHC 33.9 32.0 - 36.0 g/dL    RDW 22.2 (H) 11.0 - 14.5 %    Platelets 255 140 - 440 thou/uL    MPV 9.9 8.5 - 12.5 fL    Neutrophils % 72 (H) 50 - 70 %    Lymphocytes % 19 (L) 20 - 40 %    Monocytes % 6 2 - 10 %    Eosinophils % 2 0 - 6 %    Basophils % 1 0 - 2 %    Immature Granulocyte % 0 <=0 %    Neutrophils Absolute 2.3 2.0 - 7.7 thou/uL    Lymphocytes Absolute 0.6 (L) 0.8 - 4.4 thou/uL    Monocytes Absolute 0.2 0.0 - 0.9 thou/uL    Eosinophils Absolute 0.1 0.0 - 0.4 thou/uL    Basophils Absolute 0.0 0.0 - 0.2 thou/uL    Immature Granulocyte Absolute 0.0 <=0.0 thou/uL   Manual Differential   Result Value Ref Range    Platelet Estimate Normal Normal    Ovalocytes 1+ (!) Negative    Tear Drop Cells 1+ (!) Negative       Imaging    No results found.    Total time:20; 15 min counseling/coordination of care    Signed by: Veronica Davidson, DEWEY

## 2021-06-14 NOTE — PROGRESS NOTES
[facility] her family psychology Psychotherapy  Note-telephone visit    Name:  Jes Mistry  :  1975  MRN:  [id number]      Date of Service: 2021  Duration: 30 minutes (2:00- 2:30 PM)    The patient has been notified of following:      This telephone visit will be conducted via a call between you and your provider. We have found that certain health care needs can be provided without a face to face meeting.  This service lets us provide the care you need with a short phone conversation.      Telephone visits are billed at different rates depending on your insurance coverage. During this emergency period, for some insurers they may be billed the same as an in-person visit.  Please reach out to your insurance provider with any questions.     If during the course of the call the if provider feels a telephone visit is not appropriate, you will not be charged for this service.     Patient has given verbal consent to a Telephone visit? Yes    Target Symptoms:    The patient was seen in light of concerns regarding symptoms of anxiety and depression related to her cancer and cancer treatment as evidenced by patient and staff report.    Participation:  The patient was able to participate and benefit from treatment as evidenced by her verbal expression of ideas and initiation of topics discussed.    Mental Status:    Mood:  anxious and sad  Affect:  mood congruent  Suicidal Ideation:  absent  Homicidal Ideation:  absent  Thought process:  normal  Thought content:  Normal  Fund of Knowledge:  Sufficient  Attention/Concentration:  intact  Language ability:  intact  Speech: normal  Memory:  recent and remote memory intact  Insight and Judgement:  good  Orientation:  person, place, time and situation  Appearance: N/A-telephone visit  Eye Contact: N/A-telephone visit  Estimated IQ:  Average      Intervention:    Alice was referred to me by Veronica Davidson CNP after a recent oncology visit where her distress score was 4.   "Due to COVID-19 and the need for social distancing, Alice agreed to meet via a telephone visit.    Alice shared the story of her cancer diagnosis and treatment. In her monthly self breast exam in August, she noticed a lump in her breast and contacted her OB/GYN.  She had a mammogram 2 weeks later.  She then met with Dr. Will and had surgery to remove the lump. She was diagnosed on 10/2/2020 with malignant neoplasm of the lower inner quadrant of the right female breast, triple negative.  She completed neoadjuvant chemotherapy with dose dense AC.  She now is receiving weekly Taxol and has 4 more weeks of this treatment.  She had a follow-up with Dr. Olsen on 1/12/2021.  She felt very positive that everything is going well.  She also had a consultation with our genetic counselor on 1/13/2021.  She indicates that she has a minimal family history of cancer.  She states that her maternal grandfather had colon cancer.  She does not have any cancer that she is aware of on her mother's side.  She had her blood draw for the genetic testing.  She anticipates that she will get back the results and 7 to 10 days, and time for her surgical appointment with Dr. Will on 2/5/2021.  She is thinking about having a bilateral mastectomy.  She anticipates that she will have surgery about 4 weeks after her chemotherapy is completed.    We spent time discussing the various aspects of her treatment and how she is coping.  She processed her thoughts and feelings and also asked questions about the various aspects of her treatment.  She reached out to the [facility] and was matched with a survivor with a similar diagnosis.  She found their visit to be very uplifting.    Alice describes herself as being a \"very rooted and solid person\".  She does not have any prior history of anxiety, depression or other mental health issues.  She also does not have an addiction history.  She denies suicidal ideation.  She is experiencing some normal " "symptoms of anxiety and depression related to her cancer diagnosis and the various aspects of her treatment.  She also has some additional family stress within this year as they moved with him this year.  Alice meets diagnostic criteria for adjustment disorder with mixed emotional features of anxiety and depression.    Alice grew up in the [facility].  Her parents live in [place].  She states that her father is in and out of recovery as an alcoholic.  She has had some very difficult conversations with him during her cancer journey.  Her mother works as 1/5  in [place].  Alice has 2 brothers, one lives in [place] and the other in [place].  She is not close with her brothers.  The family had a falling out with a brother that lives in [place] a number of years ago and they are slowly trying to reestablish that relationship.  Alice indicates that she is much closer to her brother-in-law's into her own brothers.    Alice met her , Harry in their first year of college.  They started dating when they were 25 and 26 and  when they were 28 and 29.  She describes them as being \"awesome\".  They are celebrating their 17th anniversary in February.  He works as a  for a [employer] company in [facility].  She states that he has been very supportive and helpful to her during her cancer journey and throughout their marriage.    Alice and Harry have 1 son, Ming, age 10.  He attends [employer] in [place].  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  She has been working about 30 hours a week since her cancer journey.  She prefers to go into the office, versus working at home.    Alice and her family recently moved into the [place] home that her  grew [place]  [place].  Her in-laws decided that they wanted to live in a 1 level home and they are living " across the street.  They lived together for a while before her in-laws home was completed.  They sold their house in [place] over memorial weekend.  Their in-laws moved into their home across the street in September 2020.  Alice has a very close relationship with her in-laws.  She also has a close relationship with her brother-in-law's.  They are very happy to have made the move, although it added extra stress as they work to have it all come together.    Alice and her family are [employer].  Their paolo is very important to them.  She feels that by keeping her paolo as her focus has made a difference in her coping and dealing with her cancer journey.  They are members of [employer].    Alice is interested in ongoing psychotherapy to help her cope with the emotional aspects of her cancer and cancer treatment.  She was very open at sharing her thoughts and feelings and open to discuss strategies to help her cope.    Psychoterapeutic Techniques:  Cognitive-behavioral therapy, motivational interviewing and supportive psychotherapy strategies were utilized.    Necessity:    The session was necessary for the care of the patient to address symptoms of anxiety and sadness related to the patient's medical condition.    Progress:    Alice feels that she is coping well with her treatment and felt very positive after her recent appointment with Dr. Olsen on 1/12/2021.  She also talked about her genetic counseling appointment and she has completed the lab work for her genetic testing.  She is planning to see Dr. Will on 2/5/2021 and anticipates that she will have surgery 4 weeks after her Taxol treatment is completed.  She continues to be leaning towards having a bilateral mastectomy.    Alice feels that she is doing much better emotionally.  She has done several things that have been helpful to her.  She found that reaching out to the [facility] and talking with the person that she has been managed  "with has been extremely uplifting.  She also did check in with her \"boys\", her  and son, to see how they are doing and coping with her cancer and cancer treatment.  She felt positive about this conversation.  She had been frustrated that due to her neuropathy, it has been difficult for her to met.  Her son taught her how to alberto and she has been enjoying spending time with him where they both are crocheting together.  She also has been focusing on her paolo and prayer during this time, which gives her hope.    Alice openly processed her thoughts and feelings and was interested in discussing various aspects of her treatment and some of the decisions that she is needing to make along the way.  We discussed ongoing strategies to help her cope with the emotional aspects of her cancer.  She is an extremely pleasant and optimistic person.  She is interested in continuing to meet for ongoing support and assistance throughout her cancer journey.    Plan:    Nae is interested in ongoing support and assistance to help her cope with the emotional aspects of her cancer.  We will contact me on an as-needed basis for future visits.    Diagnosis:    1.  Adjustment disorder with mixed emotional features of anxiety and depression    2.  Malignant neoplasm of the lower inner quadrant of the right female breast      Problem List:  Patient Active Problem List   Diagnosis     HTN (hypertension)     Nonsmoker     Malignant neoplasm of lower-inner quadrant of right female breast, unspecified estrogen receptor status (H)     Chemotherapy-induced neutropenia (H)     Malignant neoplasm of lower-inner quadrant of right breast of female, estrogen receptor negative (H)     Note: I have reevaluated the above information with Alice and appropriate changes were made and additional information was added.  Other information was consistent from the note that was made on  1/11/2021.      This note was created with the help of Aníbal" dictation system.  Grammatical and typing errors are not intentional.     Review of long-term goals: Treatment plan was reviewed and updated.     Provider: Jennifer Fung MA, LP, LICSW    Date:  1/27/2021  Time:  8:38 AM

## 2021-06-14 NOTE — TELEPHONE ENCOUNTER
Phone call to Nae to discuss her negative preliminary genetic testing results (BRCAPlus- DAGO, BRCA1, BRCA2, CDH1, CHEK2, PALB2, PTEN, and TP53).     Alice was not available, so I left a voicemail with my contact information and asked that she call be back as soon as she can.     Rosa Arreola MS, [facility]  Licensed Genetic Counselor  [facility]  [phone]

## 2021-06-14 NOTE — PROGRESS NOTES
PT here for txt after exam with MD. Lab results approved for txt. Pt does get swelling in hands post txt and after txt goes home and ices hands to minimize swelling. Txt reviewed with pt and administered as ordered. PT tolerated txt without any problems. txt completed and port flushed/deaccessed with 2x 2 to site. Follow up reviewed and pt dc'd steady gait.

## 2021-06-14 NOTE — TELEPHONE ENCOUNTER
Second attempt today to reach Alice to discuss her negative preliminary genetic testing results (BRCAPlus- DAGO, BRCA1, BRCA2, CDH1, CHEK2, PALB2, PTEN, and TP53).      Alice was not available, so I left another voicemail with my contact information and asked that she call be back as soon as she can.      Rosa Arreola MS, [facility]  Licensed Genetic Counselor  [facility]  [phone]

## 2021-06-14 NOTE — TELEPHONE ENCOUNTER
Per Dr. Will's request, called patient to change her appointment on 2-5-21 with Dr. Will to a video visit.  LMOM for patient with this information, asked her to call me back to let me know she received this message and she is fine with plan.

## 2021-06-14 NOTE — PROGRESS NOTES
"Pt ambulates to infusion center for labs and treatment.  IVAD accessed, labs drawn et sent w/results noted.  Pt states that this past week, she started to develop \"mouth sores', but they are resolving.  Advised pt to call if this progresses and we can prescribe Magic Mouthwash.  Treatment administered as ordered without difficulty.  IVAD flushed w/NS et heparin and needle deaccessed.  Pt left clinic stable to lobby.  Plan RTC as scheduled.  "

## 2021-06-14 NOTE — PROGRESS NOTES
Pt arrived ambulatory to clinic for Cycle # 6 Day # 15 of her chemotherapy regimen.  Port was accessed using aseptic technique without difficulties with excellent blood return.  Labs were reviewed, pt ok for treatment.  Pt states that ice really helped her hands post treatment.  Swelling was better this last week than previous week.   Administered premedications and chemotherapy per MD order.  Pt tolerated infusion well, no s/s of infusion reaction.  Port was flushed with NS and Heparin then de-accessed using 2x2 and papertape.  Pt verbalized understanding of plan of care and return to clinic.

## 2021-06-14 NOTE — TELEPHONE ENCOUNTER
2/1/2021    I called Jes with her BRCA1 and BRCA2 results today.  Jes is NEGATIVE for mutations in the genes on the BRCAPlus panel by sequencing and deletion/duplication analysis (DAGO, BRCA1, BRCA2, CDH1, CHEK2, PALB2, PTEN, and TP53).     We rushed these genes so that she can make surgical decisions. Results for the remaining genes (CancerNext panel) are still pending and should be completed within the next 1-2 weeks.  I will call Alice with full results as soon as they are available.     Plan:  1. She plans to follow-up with her surgical and oncology teams.  2. She will return to clinic to discuss the remainder of the CancerNext panel, once results are available    If Jes has any further questions, I encouraged her to contact me at [phone].    Rosa Arreola MS, [facility]  Licensed Genetic Counselor  [facility]  [phone]

## 2021-06-14 NOTE — PROGRESS NOTES
Pt ambulates to infusion center for lab draw prior to MD visit.  IVAD accessed, labs drawn et sent.  Pt was seen by Dr. Olsen today and orders were approved.  Treatment administered as ordered without difficulty.  IVAD flushed w/NS et heparin and needle deaccessed.  Pt left clinic stable to [facility].  Plan RTC as scheduled.

## 2021-06-14 NOTE — PROGRESS NOTES
Pt here ambulatory for txt. PT states feeling good today/appetite good/ neuropathy only intermittent in two fingers on right hand. Port accessed after emla cream removed/ labs drawn. Lab results approved for txt. txt administered as ordered and pt tolerated without any problems. txt completed and port flushed/deaccessed with 2x2 to site. Follow up reviewed and pt dc'd steady gait.

## 2021-06-15 NOTE — PROGRESS NOTES
Pt arrived ambulatory to clinic for Cycle # 8 Day # 8 of her chemotherapy regimen.  Port was accessed using aseptic technique without difficulties with excellent blood return.  Labs were reviewed, pt ok for treatment.   Administered premedications and chemotherapy per MD order.  Pt tolerated infusion well, no s/s of infusion reaction.  Port was flushed with NS and Heparin then de-accessed using 2x2 and papertape.  Pt verbalized understanding of plan of care and return to clinic.

## 2021-06-15 NOTE — PROGRESS NOTES
[facility] Hematology and Oncology Progress Note    Patient: Jes Mistry  MRN: [id number]  Date of Service: 03/03/2021        Reason for Visit    Chief Complaint   Patient presents with     HE Cancer     Malignant neoplasm of lower-inner quadrant of right breast of female, estrogen receptor negative       Assessment and Plan  Cancer Staging  Malignant neoplasm of lower-inner quadrant of right breast of female, estrogen receptor negative (H)  Staging form: Breast, AJCC 8th Edition  - Clinical: Stage IIB (cT2, cN0, cM0, G3, ER-, MN-, HER2-) - Signed by Kaela Plunkett, CNP on 10/9/2020    1. Breast cancer, stage IIB: pt with triple negative disease. Has completed neoadjuvant chemo with dose dense AC and 12 weekly doses of Taxol. Last dose of Taxol was 2/18. She is now scheduled for bilateral mastectomy on 3/24. We will see her 2-4 weeks after surgery.     2.  7 mm pulmonary nodule: This does not have any activity on PET scan so likely benign.  Radiology did recommend a follow-up so we will likely repeat a CT scan in 6 months, roughly April.    3. Young age, triple negative disease: met with genetics. Testing negative, but she does have 2 vairants of uncertain significance. (BMPR1A p.G32R and RAD51D p.P214Q).     4. Microcytic anemia: resolved. Can stop iron.     ECOG Performance   ECOG Performance Status: 0     Distress Assessment  Distress Assessment Score: 5(upcoming surgery)      Pain  Currently in Pain: No/denies      Problem List    1. Malignant neoplasm of lower-inner quadrant of right breast of female, estrogen receptor negative (H)     2. Malignant neoplasm of lower-inner quadrant of right female breast, unspecified estrogen receptor status (H)        ______________________________________________________________________________    History of Present Illness    DIAGNOSIS: breast cancer, right breast. Palpable lump. 24mm. High grade, poorly differentiated carcinoma. ER1%+, MN-HER2/emily- so  "essentially triple negative. T2N0M0, Stage IIB    TREATMENT: dose dense AC in neoadjuvant fashion.  Completed 4 cycles. Will start weekly Taxol today.     INTERIM HISTORY: pt is here today for follow up. She completed chemo about 2 weeks ago. She is feeling pretty good. Had some tingling in spine and upper arms, but states this has gone away. A little anxious for surgery.     Review of Systems    Constitutional  Constitutional (WDL): Exceptions to WDL  Hot flashes/Night Sweats: Concerns(occ)  Neurosensory  Neurosensory (WDL): Exceptions to WDL(tingling/numbness down mid-spine and upper arms)  Peripheral Sensory Neuropathy: Concerns(fingertips)  Eye   Eye Disorder (WDL): All eye disorder elements are within defined limits  Ear  Ear Disorder (WDL): All ear disorder elements are within defined limits  Cardiovascular  Cardiovascular (WDL): Exceptions to WDL  Palpitations: Concerns(flutters)  Pulmonary  Respiratory (WDL): Within Defined Limits  Gastrointestinal  Gastrointestinal (WDL): All gastrointestinal elements are within defined limits  Genitourinary  Genitourinary (WDL): All genitourinary elements are within defined limits  Lymphatic  Lymph (WDL): All lymph disorder elements are within defined limits  Musculoskeletal and Connective Tissue  Musculoskeletal and Connetive Tissue Disorders (WDL): All Musculoskeletal and Connetive Tissue Disorder elements are within defined limits  Integumentary  Integumentary (WDL): All integumentary elements are within defined limits  Patient Coping  Patient Coping: Accepting;Open/discussion  Accompanied by  Accompanied by: Alone  Oral Chemo Adherence           Past History  Past Medical History:   Diagnosis Date     History of anesthesia complications     Slow to wake up     HTN (hypertension)      Nonsmoker      Thyroid nodule 2011       PHYSICAL EXAM  /80   Pulse 71   Ht 5' 3\" (1.6 m)   Wt 130 lb 4.8 oz (59.1 kg)   SpO2 100%   BMI 23.08 kg/m      GENERAL: no acute " distress. Cooperative in conversation. Here alone due to visitor restrictions. Mask on  RESP: Regular respiratory rate. No expiratory wheezes   MUSCULOSKELETAL: no bilateral leg swelling  NEURO: non focal. Alert and oriented x3.   PSYCH: within normal limits. No depression or anxiety.  SKIN: exposed skin is dry intact.   BREAST: deferred    Lab Results    Recent Results (from the past 168 hour(s))   Comprehensive Metabolic Panel   Result Value Ref Range    Sodium 140 136 - 145 mmol/L    Potassium 3.8 3.5 - 5.0 mmol/L    Chloride 102 98 - 107 mmol/L    CO2 28 22 - 31 mmol/L    Anion Gap, Calculation 10 5 - 18 mmol/L    Glucose 115 70 - 125 mg/dL    BUN 12 8 - 22 mg/dL    Creatinine 0.72 0.60 - 1.10 mg/dL    GFR MDRD Af Amer >60 >60 mL/min/1.73m2    GFR MDRD Non Af Amer >60 >60 mL/min/1.73m2    Bilirubin, Total 0.4 0.0 - 1.0 mg/dL    Calcium 9.8 8.5 - 10.5 mg/dL    Protein, Total 7.5 6.0 - 8.0 g/dL    Albumin 4.5 3.5 - 5.0 g/dL    Alkaline Phosphatase 56 45 - 120 U/L    AST 26 0 - 40 U/L    ALT 46 (H) 0 - 45 U/L   HM1 (CBC with Diff)   Result Value Ref Range    WBC 5.4 4.0 - 11.0 thou/uL    RBC 4.03 3.80 - 5.40 mill/uL    Hemoglobin 13.3 12.0 - 16.0 g/dL    Hematocrit 38.1 35.0 - 47.0 %    MCV 95 80 - 100 fL    MCH 33.0 27.0 - 34.0 pg    MCHC 34.9 32.0 - 36.0 g/dL    RDW 13.6 11.0 - 14.5 %    Platelets 300 140 - 440 thou/uL    MPV 9.5 8.5 - 12.5 fL    Neutrophils % 66 50 - 70 %    Lymphocytes % 21 20 - 40 %    Monocytes % 10 2 - 10 %    Eosinophils % 1 0 - 6 %    Basophils % 1 0 - 2 %    Immature Granulocyte % 1 (H) <=0 %    Neutrophils Absolute 3.6 2.0 - 7.7 thou/uL    Lymphocytes Absolute 1.1 0.8 - 4.4 thou/uL    Monocytes Absolute 0.6 0.0 - 0.9 thou/uL    Eosinophils Absolute 0.1 0.0 - 0.4 thou/uL    Basophils Absolute 0.1 0.0 - 0.2 thou/uL    Immature Granulocyte Absolute 0.0 <=0.0 thou/uL       Imaging    No results found.        Signed by: Kaela Plunkett, CNP

## 2021-06-15 NOTE — PROGRESS NOTES
Jes Mistry is a 45 y.o. female who is being evaluated via a billable video visit.        Video Start Time: 9:45      Subjective     Jes Mistry is 45 y.o. and presents to discuss further treatment of her right breast cancer. Alice was diagnosed several months ago with an aggreassive breast cancer and has been receiving arturo-adjuvant chemotherapy. She is feeling well. She noted that the mass became smaller very quickly after starting chemotherapy. Then it plateaued over the last couple of weeks. She has just two more treatments of Taxol left.  She has has genetic testing and this came back negative for any mutations.    Past Medical History:   Diagnosis Date     History of anesthesia complications     Slow to wake up     HTN (hypertension)      Nonsmoker      Thyroid nodule 2011     Current Outpatient Medications   Medication Instructions     calcium, as carbonate, (TUMS) 200 mg calcium (500 mg) chewable tablet 1 tablet, Oral, As needed     ferrous sulfate 325 mg, Oral, Daily with breakfast     lidocaine-prilocaine (EMLA) cream as needed up to three times per day     lisinopriL-hydrochlorothiazide (PRINZIDE,ZESTORETIC) 10-12.5 mg per tablet 1 tablet, Oral, DAILY     ondansetron (ZOFRAN) 4-8 mg, Oral, Every 4 hours PRN     Allergies   Allergen Reactions     Sulfa (Sulfonamide Antibiotics)      Latex Itching         Objective       Vitals:  No vitals were obtained today due to virtual visit.  Appears well. Alert and oriented.    Physical Exam      Impression: Right breast cancer, currently finishing up arturo-adjuvant chemotherapy. Had a long talk again today about her surgical options. She is now a very good candidate for a lumpectomy. She was clinically node negative so just a SLN biopsy is indicated. If she has a lumpectomy then she would have follow up radiation. If she has a  Mastectomy then she would likely not need radiation. She has had several months to consider this and has already decided that she  wants bilateral mastectomies. I made sure sure understood that she does not improve her survival. She states that she does not want radiation and wants symmetry. She is not interested in reconstruction.    Plan: Bilateral Mastectomies with Right SLN Biopsy, and Port removal. This is now an outpatient procedure. Discussed the drains. She asked appropriate questions. Would like to schedule about 4 weeks after her last chemo.    Assessment & Plan   Problem List Items Addressed This Visit     Malignant neoplasm of lower-inner quadrant of right breast of female, estrogen receptor negative (H) - Primary    Relevant Orders    [place] Lymph Node Injection         Review of external notes as documented above         75 minutes spent on the date of the encounter doing chart review, history and exam, documentation and further activities as noted above    [id number]}        No follow-ups on file.  Kimberly Will MD  [facility]    Video-Visit Details    Type of service:  Video Visit    Video End Time (time video stopped): 10:36 AM  Originating Location (pt. Location): Home    Distant Location (provider location):  [facility]     Platform used for Video Visit: Desmond

## 2021-06-15 NOTE — PROGRESS NOTES
Patient is here for labs and provider for Malignant neoplasm of lower-inner quadrant of right breast of female, estrogen receptor negative.

## 2021-06-15 NOTE — PROGRESS NOTES
Pt ambulates to infusion center for lab draw and treatment.  IVAD accessed, labs drawn et sent w/results noted.  Treatment administered as ordered without difficulty.  IVAD flushed w/NS et heparin and needle deaccessed.  Pt left clinic stable to [facility].  Plan RTC as scheduled.

## 2021-06-15 NOTE — PROGRESS NOTES
"2/26/2021    Referring Provider: Kaela Plunkett CNP    Presenting Information:  I spoke with Alice over the phone today to discuss her genetic testing results. Her blood was drawn on 1/21/2021.  Can[facility]Next testing was ordered from [facility]. This testing was done because of her personal history of breast cancer and family history of gastrointestinal cancers.    Genetic Testing Result: Multiple Variants of Uncertain Significance (VUS)  Jes was found to have 2 variants of uncertain significance (VUS).      BMPR1A p.G32R (c.94G>C)     RAD51D p.P214Q (c.641C>A)    No other variants or mutations were found in these genes. Given the uncertain significance of this result, medical management decisions should NOT be made based on this test result alone.    Of note, Jes is negative for pathogenic (harmful) mutations in the APC, DAGO, AXIN2, BARD1, BMPR1A, BRCA1, BRCA2, BRIP1, CDH1, CDK4, CDKN2A, CHEK2, DICER1, MLH1, MSH2, MSH3, MSH6, MUTYH, NBN, NF1, NTHL1, PALB2, PMS2, PTEN, RAD51C, RAD51D, RECQL, SMAD4, SMARCA4, STK11 and TP53 (sequencing and deletion/duplication); HOXB13, POLD1 and POLE (sequencing only); EPCAM and GREM1 (deletion/duplication only)  genes. No pathogenic (harmful) mutations were found in any of the 36 genes analyzed.     Testing did not detect an identifiable pathogenic (harmful) mutation associated with Hereditary Breast and Ovarian Cancer syndrome (BRCA1, BRCA2), Duarte syndrome (MLH1, MSH2, MSH6, PMS2, EPCAM), Familial Adenomatous Polyposis (APC), Hereditary Diffuse Gastric Cancer (CDH1), Familial Atypical Multiple Mole Melanoma syndrome (CDK4, CDKN2A), Juvenile Polyposis syndrome (BMPR1A, SMAD4), Cowden syndrome (PTEN), Li Fraumeni syndrome (TP53), Peutz-Jeghers syndrome (STK11), MUTYH Associated Polyposis (MUTYH), or Neurofibromatosis type 1 (NF1).    A copy of the test report can be found in the Media tab and named \"[facility]\". The report is scanned in as a linked " document.    Interpretation:  We discussed several different interpretations of this inconclusive test result. It is not clear if any of these variants are associated with increased cancer risk. These variants may be benign changes that do not increased cancer risk, or they may be harmful mutations that cause increased risk for certain cancers.    BMPR1A p.G32R (c.94G>C)  Pathogenic (harmful) mutations in the BMPR1A gene cause Juvenile Polyposis Syndrome (JPS). Individuals with JPS have an increased risk for juvenile type polyps to form in the stomach and/or colon. Adults and children alike can have these polyps, even though they are called  juvenile . Individuals with JPS are also at increased risk for colon and stomach cancers and possibly pancreatic and small bowel cancers. We discussed that there are specific screening and management recommendations for those with mutations in the BMPR1A gene. I reiterated that, at this time, the variant found in the BMPR1A gene for Jes is considered a variant of uncertain significance and her medical management will NOT change based on this result alone.     RAD51D p.P214Q (c.641C>A)  Pathogenic (harmul) mutations in the RAD51D gene are associated with increased risk for ovarian cancer and possibly breast and prostate cancer. We discussed that there are specific screening and management recommendations for those with mutations in the RAD51D gene. I reiterated that, at this time, the variant found in the RAD51D gene for Jes is considered a variant of uncertain significance and her medical management will NOT change based on this result alone.     The laboratory is working to determine if any of these variants are harmful or benign, and they will contact me if any of them are reclassified. If these variants are determined to be benign, there may be a different gene or combination of genes and environment that are associated with the cancers in Jes and/or her  relatives.      It is also important to consider that her other relatives with cancer history may have had a mutation in one of the genes tested and she did not inherit it. There is also a small possibility that there is a mutation in one of these genes, and we could not find it with our current testing methods.       Inheritance:  We reviewed the autosomal dominant inheritance of the variants in the BMPR1A and RAD51D genes.  We discussed that Jes has a 50% chance to pass each of these variants to each of her children. Likewise, there is a 50% chance for each of these variants to be present in each of her siblings. Because it is unclear what, if any, risk is associated with these variants, clinical genetic testing for these two variants alone is not recommended for relatives.    Family Studies:  The laboratory may offer additional research based testing for specific relatives in order to help reclassify these variants.    Screening:  Based on this inconclusive test result, it is important for Jes and her relatives to refer back to the family history for appropriate cancer screening.      Alice should continue to follow her oncology team's recommendations for the treatment and follow-up for her breast cancer history.     Jes s close female relatives remain at increased risk for breast cancer given their family history. Breast cancer screening is generally recommended to begin approximately 10 years younger than the earliest age of breast cancer diagnosis in the family, or at age 40, whichever comes first. In this family, screening may begin at age 35. Breast screening options should be discussed with an individual's primary care provider and a genetic counselor, to determine at what age to begin screening, what screening is appropriate, and if additional screening (such as breast MRI) is necessary based on personal/family history factors.     We discussed that Jes likely has some increased risk  for gastrointestinal cancers given her maternal grandparents' histories, but routine screening is typically not recommended.  Jes is encouraged to discuss this history with her care providers.      Other population cancer screening options, such as those recommended by the American Cancer Society and the National Comprehensive Cancer Network (NCCN), are also appropriate for Jes and her family. These screening recommendations may change if there are changes to Jes's personal and/or family history. Final screening recommendations should be made by each individual's managing physician.     Additional Testing Considerations:  Although Jes's genetic testing result is inconclusive, other relatives may still carry a harmful gene mutation associated with pancreatic cancer. Genetic counseling is recommended for her mother and her maternal uncles to discuss genetic testing options. If any of these relatives do pursue genetic testing, Jes is encouraged to contact me so that we may review the impact of their test results on her    Summary:  We do not have an explanation for her personal and family history of cancer. Because of that, it is important that she continue with cancer screening based on her personal and family history as discussed above.    Genetic testing is rapidly advancing, and new cancer susceptibility genes will most likely be identified in the future. Therefore, I encouraged Jes to contact me annually or if there are changes in her personal or family history. This may change how we assess her cancer risk, screening, and the testing we would offer.    Plan:  1.  I provided Jes with a copy of her test results today.    2. She plans to follow-up with her oncology team for the treatment for her breast cancer and her primary care providers for routine care.  3. She should contact me annually, or sooner if her family history changes.  4. I will contact Jes if the laboratory  informs me that these variants have been reclassified.  This may change screening and testing recommendations for Jes and her relatives.    If Jes has any further questions, I encouraged her to contact me at [phone].      Time spent over the phone: 57 minutes    Rosa Arreola MS, [place]  Licensed Genetic Counselor  [facility]  [phone]

## 2021-06-15 NOTE — PROGRESS NOTES
[facility] Hematology and Oncology Progress Note    Patient: Jes Mistry  MRN: [id number]  Date of Service: 02/03/2021      Assessment and Plan:    Cancer Staging  Malignant neoplasm of lower-inner quadrant of right breast of female, estrogen receptor negative (H)  Staging form: Breast, AJCC 8th Edition  - Clinical: Stage IIB (cT2, cN0, cM0, G3, ER-, DC-, HER2-) - Signed by Kaela Plunkett CNP on 10/9/2020    1.  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  She has minimal neuropathy, grade 1.  Intermittent edema.  Today is dose 10 of 12.  She is seeing surgery later this week.    2.  Palpitations: We obtained an EKG in clinic today.  I reviewed the tracing myself which shows an occasional PVC.  Intervals are normal.  I asked her to let us know if they become more frequent.  Electrolytes have been normal.    ECOG Performance   ECOG Performance Status: 0    Distress Assessment  Distress Assessment Score: No distress    Pain  Currently in Pain: No/denies    Diagnosis:    1.  Carcinoma of the right breast: High-grade.  6 o'clock position.  Diagnosed September 2020.  Estrogen receptor positive in 1%.  DC and HER-2 negative.  Measured 24 x 13 x 19 mm on ultrasound.  Ultrasound of the axilla was negative.    Treatment:    Neoadjuvant chemotherapy with Adriamycin and Cytoxan started October 9, 2020.  Weekly Taxol started December 3, 2020.    Interim History:    Alice returns today for follow-up visit.  She was seen about 3 weeks ago.  In the interim she has been doing okay.  She notes occasional palpitations.  No shortness of breath or lower extremity edema.  No new areas of pain.    Review of Systems:    Constitutional  Constitutional (WDL): Exceptions to WDL  Fatigue: Fatigue not relieved by rest - Limiting instrumental ADL  Neurosensory  Neurosensory (WDL): Exceptions to WDL  Peripheral Sensory Neuropathy: Asymptomatic, loss of deep tendon reflexes or  paresthesia(fingertips)  Cardiovascular  Edema Limbs: 5 - 10% inter-limb discrepancy in volume or circumference at point of greatest visible difference, swelling or obscuration of anatomic architecture on close inspection(with treatment)  Pulmonary  Respiratory (WDL): Exceptions to WDL  Cough: Mild symptoms, nonprescription intervention indicated(dry cough)  Gastrointestinal  Gastrointestinal (WDL): Exceptions to WDL  Dysgeusia: Altered taste but no change in diet  Genitourinary  Genitourinary (WDL): All genitourinary elements are within defined limits  Integumentary  Integumentary (WDL): Exceptions to WDL  Alopecia: Hair loss of >50% normal for that individual that is readily apparent to others, a wig or hair piece is necessary if the patient desires to completely camouflage the hair loss, associated with psychosocial impact  Patient Coping  Patient Coping: Accepting  Accompanied by  Accompanied by: Alone    Past History:    Past Medical History:   Diagnosis Date     History of anesthesia complications     Slow to wake up     HTN (hypertension)      Nonsmoker      Thyroid nodule 2011     Physical Exam:    Recent Vitals 2/3/2021   Weight 127 lbs   BSA (m2) 1.6 m2   /67   Pulse 80   Temp 98.2   Temp src 1   SpO2 99   Some recent data might be hidden     General: patient appears stated age of 45 y.o.. Nontoxic and in no distress.   HEENT: Head: atraumatic, normocephalic. Sclerae anicteric.  Chest:  Normal respiratory effort  Cardiac:  No edema.   Abdomen: abdomen is non-distended  Extremities: normal tone and muscle bulk.  Skin: no lesions or rash. Warm and dry.   CNS: alert and oriented. Grossly non-focal.   Psychiatric: normal mood and affect.     Lab Results:    Recent Results (from the past 168 hour(s))   Comprehensive Metabolic Panel   Result Value Ref Range    Sodium 139 136 - 145 mmol/L    Potassium 4.0 3.5 - 5.0 mmol/L    Chloride 102 98 - 107 mmol/L    CO2 29 22 - 31 mmol/L    Anion Gap, Calculation 8 5  - 18 mmol/L    Glucose 103 70 - 125 mg/dL    BUN 15 8 - 22 mg/dL    Creatinine 0.67 0.60 - 1.10 mg/dL    GFR MDRD Af Amer >60 >60 mL/min/1.73m2    GFR MDRD Non Af Amer >60 >60 mL/min/1.73m2    Bilirubin, Total 0.5 0.0 - 1.0 mg/dL    Calcium 9.8 8.5 - 10.5 mg/dL    Protein, Total 7.2 6.0 - 8.0 g/dL    Albumin 4.3 3.5 - 5.0 g/dL    Alkaline Phosphatase 49 45 - 120 U/L    AST 22 0 - 40 U/L    ALT 38 0 - 45 U/L   HM1 (CBC with Diff)   Result Value Ref Range    WBC 3.3 (L) 4.0 - 11.0 thou/uL    RBC 3.83 3.80 - 5.40 mill/uL    Hemoglobin 12.6 12.0 - 16.0 g/dL    Hematocrit 36.6 35.0 - 47.0 %    MCV 96 80 - 100 fL    MCH 32.9 27.0 - 34.0 pg    MCHC 34.4 32.0 - 36.0 g/dL    RDW 13.6 11.0 - 14.5 %    Platelets 287 140 - 440 thou/uL    MPV 9.7 8.5 - 12.5 fL    Neutrophils % 69 50 - 70 %    Lymphocytes % 22 20 - 40 %    Monocytes % 6 2 - 10 %    Eosinophils % 2 0 - 6 %    Basophils % 1 0 - 2 %    Immature Granulocyte % 1 (H) <=0 %    Neutrophils Absolute 2.3 2.0 - 7.7 thou/uL    Lymphocytes Absolute 0.7 (L) 0.8 - 4.4 thou/uL    Monocytes Absolute 0.2 0.0 - 0.9 thou/uL    Eosinophils Absolute 0.1 0.0 - 0.4 thou/uL    Basophils Absolute 0.0 0.0 - 0.2 thou/uL    Immature Granulocyte Absolute 0.0 <=0.0 thou/uL   ECG 12 lead with MUSE   Result Value Ref Range    SYSTOLIC BLOOD PRESSURE      DIASTOLIC BLOOD PRESSURE      VENTRICULAR RATE 75 BPM    ATRIAL RATE 75 BPM    P-R INTERVAL 130 ms    QRS DURATION 84 ms    Q-T INTERVAL 398 ms    QTC CALCULATION (BEZET) 444 ms    P Axis 60 degrees    R AXIS 49 degrees    T AXIS 49 degrees    MUSE DIAGNOSIS       Sinus rhythm with occasional Premature ventricular complexes  Otherwise normal ECG  When compared with ECG of 22-MAY-2018 10:49,  Premature ventricular complexes are now Present  Confirmed by NING FIGUEREDO, ROBERT LOC:WW ([id number]) on 2/3/2021 11:38:24 AM       Imaging:    No results found.      Signed by: Elia Olsen MD

## 2021-06-16 ENCOUNTER — OFFICE VISIT - HEALTHEAST (OUTPATIENT)
Dept: ONCOLOGY | Facility: HOSPITAL | Age: 46
End: 2021-06-16

## 2021-06-16 ENCOUNTER — AMBULATORY - HEALTHEAST (OUTPATIENT)
Dept: INFUSION THERAPY | Facility: HOSPITAL | Age: 46
End: 2021-06-16

## 2021-06-16 DIAGNOSIS — Z17.1 MALIGNANT NEOPLASM OF LOWER-INNER QUADRANT OF RIGHT BREAST OF FEMALE, ESTROGEN RECEPTOR NEGATIVE (H): ICD-10-CM

## 2021-06-16 DIAGNOSIS — R91.8 PULMONARY NODULES: ICD-10-CM

## 2021-06-16 DIAGNOSIS — C50.311 MALIGNANT NEOPLASM OF LOWER-INNER QUADRANT OF RIGHT BREAST OF FEMALE, ESTROGEN RECEPTOR NEGATIVE (H): ICD-10-CM

## 2021-06-16 DIAGNOSIS — C50.311 MALIGNANT NEOPLASM OF LOWER-INNER QUADRANT OF RIGHT FEMALE BREAST, UNSPECIFIED ESTROGEN RECEPTOR STATUS (H): ICD-10-CM

## 2021-06-16 PROBLEM — T45.1X5A CHEMOTHERAPY-INDUCED NEUTROPENIA (H): Status: ACTIVE | Noted: 2020-10-05

## 2021-06-16 PROBLEM — D70.1 CHEMOTHERAPY-INDUCED NEUTROPENIA (H): Status: ACTIVE | Noted: 2020-10-05

## 2021-06-16 LAB
ALBUMIN SERPL-MCNC: 4 G/DL (ref 3.5–5)
ALP SERPL-CCNC: 96 U/L (ref 45–120)
ALT SERPL W P-5'-P-CCNC: 32 U/L (ref 0–45)
ANION GAP SERPL CALCULATED.3IONS-SCNC: 9 MMOL/L (ref 5–18)
AST SERPL W P-5'-P-CCNC: 25 U/L (ref 0–40)
BASOPHILS # BLD AUTO: 0 THOU/UL (ref 0–0.2)
BASOPHILS NFR BLD AUTO: 0 % (ref 0–2)
BILIRUB SERPL-MCNC: 0.4 MG/DL (ref 0–1)
BUN SERPL-MCNC: 15 MG/DL (ref 8–22)
CALCIUM SERPL-MCNC: 9.9 MG/DL (ref 8.5–10.5)
CHLORIDE BLD-SCNC: 102 MMOL/L (ref 98–107)
CO2 SERPL-SCNC: 30 MMOL/L (ref 22–31)
CREAT SERPL-MCNC: 0.74 MG/DL (ref 0.6–1.1)
EOSINOPHIL # BLD AUTO: 0.1 THOU/UL (ref 0–0.4)
EOSINOPHIL NFR BLD AUTO: 3 % (ref 0–6)
ERYTHROCYTE [DISTWIDTH] IN BLOOD BY AUTOMATED COUNT: 12.8 % (ref 11–14.5)
GFR SERPL CREATININE-BSD FRML MDRD: >60 ML/MIN/1.73M2
GLUCOSE BLD-MCNC: 149 MG/DL (ref 70–125)
HCT VFR BLD AUTO: 42.7 % (ref 35–47)
HGB BLD-MCNC: 14.8 G/DL (ref 12–16)
IMM GRANULOCYTES # BLD: 0 THOU/UL
IMM GRANULOCYTES NFR BLD: 1 %
LYMPHOCYTES # BLD AUTO: 1.1 THOU/UL (ref 0.8–4.4)
LYMPHOCYTES NFR BLD AUTO: 25 % (ref 20–40)
MCH RBC QN AUTO: 30.4 PG (ref 27–34)
MCHC RBC AUTO-ENTMCNC: 34.7 G/DL (ref 32–36)
MCV RBC AUTO: 88 FL (ref 80–100)
MONOCYTES # BLD AUTO: 0.3 THOU/UL (ref 0–0.9)
MONOCYTES NFR BLD AUTO: 7 % (ref 2–10)
NEUTROPHILS # BLD AUTO: 3 THOU/UL (ref 2–7.7)
NEUTROPHILS NFR BLD AUTO: 64 % (ref 50–70)
PLATELET # BLD AUTO: 232 THOU/UL (ref 140–440)
PMV BLD AUTO: 9.7 FL (ref 8.5–12.5)
POTASSIUM BLD-SCNC: 3.9 MMOL/L (ref 3.5–5)
PROT SERPL-MCNC: 7.5 G/DL (ref 6–8)
RBC # BLD AUTO: 4.87 MILL/UL (ref 3.8–5.4)
SODIUM SERPL-SCNC: 141 MMOL/L (ref 136–145)
WBC: 4.6 THOU/UL (ref 4–11)

## 2021-06-16 ASSESSMENT — MIFFLIN-ST. JEOR: SCORE: 1210.6

## 2021-06-16 NOTE — TELEPHONE ENCOUNTER
Per Dr. Will's request, called Alice to schedule her for a post op appointment with Dr. Will on Tuesday, 4-6-21 at 1220 at the [facility].

## 2021-06-16 NOTE — ANESTHESIA POSTPROCEDURE EVALUATION
Patient: Jes Mistry  Procedure(s):  Bilateral Mastectomies; Right [place] Lymph Node Biopsy; Port Removal (Bilateral)  Anesthesia type: general    Patient location: Phase II Recovery  Last vitals:   Vitals Value Taken Time   /73 03/24/21 1032   Temp 36.2  C (97.1  F) 03/24/21 0946   Pulse 78 03/24/21 1038   Resp 16 03/24/21 1027   SpO2 93 % 03/24/21 1038   Vitals shown include unvalidated device data.  Post vital signs: stable  Level of consciousness: awake and responds to simple questions  Post-anesthesia pain: pain controlled  Post-anesthesia nausea and vomiting: no  Pulmonary: unassisted  Cardiovascular: stable  Hydration: adequate  Anesthetic events: no    QCDR Measures:  ASA# 11 - Dolores-op Cardiac Arrest: ASA11B - Patient did NOT experience unanticipated cardiac arrest  ASA# 12 - Dolores-op Mortality Rate: ASA12B - Patient did NOT die  ASA# 13 - PACU Re-Intubation Rate: ASA13B - Patient did NOT require a new airway mgmt  ASA# 10 - Composite Anes Safety: ASA10A - No serious adverse event    Additional Notes:

## 2021-06-16 NOTE — TELEPHONE ENCOUNTER
Patient called, her drain has been putting out between 15-20 ml per day.  She will come in on Monday, 4-19-21 for RN drain removal.  Also made her a follow up post op on 4-29-21 to see Dr. Will for seroma check.

## 2021-06-16 NOTE — TELEPHONE ENCOUNTER
Telephone Encounter by Lilian Kemp CMA at 2/12/2020  1:10 PM     Author: Lilian Kemp CMA Service: -- Author Type: Certified Medical Assistant    Filed: 2/12/2020  1:12 PM Encounter Date: 2/11/2020 Status: Signed    : Lilian Kemp CMA (Certified Medical Assistant)       Called and LVM for pt to call back - did she request medication?      Cindy Michael, Cindy Reyes Care Team [place] 17 hours ago (7:11 PM)      Please contact the patient as this medication was held in January 2020 with recommendation to check blood pressures prior to restarting to determine whether or not the medication was actually needed.    Routing comment

## 2021-06-16 NOTE — TELEPHONE ENCOUNTER
Patient called, said her drain levels have been less than 20 ml for a few days in a row.  Told her she can expect to have her drain removed tomorrow at her post op with Dr. Will.

## 2021-06-16 NOTE — PROGRESS NOTES
In for follow-up of her bilateral mastectomies  with sentinel lymph node biopsy on the right.  She is feeling well.  She is having very minimal pain.      Physical exam:  Appears well.  Does not appear in any discomfort.  Breasts: Incisions are healing nicely with no signs of infection.  No swelling.  I pulled her left drain today.    Pathology: There was some residual tumor.  The tumor was 0.6 cm.  The margins are negative.  Her sentinel lymph node is negative.  Interestingly, the tumor had a different profile than the original biopsy.  The residual is 30% ER positive, TN negative and the HER-2 is 2+ and has been sent for FISH.    Impression: Postop visit. Doing well.  Her pathology is unusual in that she was triple negative but her residual tumor is estrogen receptor positive albeit somewhat low.  Will be interesting to see if oncology recommends hormone therapy versus Xeloda.    Plan: Follow-up with me when her right drain is putting out less than 20 cc a day for 2 days.

## 2021-06-16 NOTE — PATIENT INSTRUCTIONS - HE
Patient Education   Tamoxifen Citrate Oral tablet    What is this medicine?  TAMOXIFEN (ta MOX i fen) blocks the effects of estrogen. It is commonly used to treat breast cancer. It is also used to decrease the chance of breast cancer coming back in women who have received treatment for the disease. It may also help prevent breast cancer in women who have a high risk of developing breast cancer.  This medicine may be used for other purposes; ask your health care provider or pharmacist if you have questions.  What should I tell my health care provider before I take this medicine?  They need to know if you have any of these conditions:    blood clots    blood disease    cataracts or impaired eyesight    endometriosis    high calcium levels    high cholesterol    irregular menstrual cycles    liver disease    stroke    uterine fibroids    an unusual or allergic reaction to tamoxifen, other medicines, foods, dyes, or preservatives    pregnant or trying to get pregnant    breast-feeding  How should I use this medicine?  Take this medicine by mouth with a glass of water. Follow the directions on the prescription label. You can take it with or without food. Take your medicine at regular intervals. Do not take your medicine more often than directed. Do not stop taking except on your doctor's advice.  A special MedGuide will be given to you by the pharmacist with each prescription and refill. Be sure to read this information carefully each time.  Talk to your pediatrician regarding the use of this medicine in children. While this drug may be prescribed for selected conditions, precautions do apply.  Overdosage: If you think you have taken too much of this medicine contact a poison control center or emergency room at once.  NOTE: This medicine is only for you. Do not share this medicine with others.  What if I miss a dose?  If you miss a dose, take it as soon as you can. If it is almost time for your next dose, take only that  dose. Do not take double or extra doses.  What may interact with this medicine?    aminoglutethimide    bromocriptine    chemotherapy drugs    female hormones, like estrogens and birth control pills    letrozole    medroxyprogesterone    phenobarbital    rifampin    warfarin  This list may not describe all possible interactions. Give your health care provider a list of all the medicines, herbs, non-prescription drugs, or dietary supplements you use. Also tell them if you smoke, drink alcohol, or use illegal drugs. Some items may interact with your medicine.  What should I watch for while using this medicine?  Visit your doctor or health care professional for regular checks on your progress. You will need regular pelvic exams, breast exams, and mammograms. If you are taking this medicine to reduce your risk of getting breast cancer, you should know that this medicine does not prevent all types of breast cancer. If breast cancer or other problems occur, there is no guarantee that it will be found at an early stage.  Do not become pregnant while taking this medicine or for 2 months after stopping this medicine. Stop taking this medicine if you get pregnant or think you are pregnant and contact your doctor. This medicine may harm your unborn baby. Women who can possibly become pregnant should use birth control methods that do not use hormones during tamoxifen treatment and for 2 months after therapy has stopped. Talk with your health care provider for birth control advice.  Do not breast feed while taking this medicine.  What side effects may I notice from receiving this medicine?  Side effects that you should report to your doctor or health care professional as soon as possible:    changes in vision (blurred vision)    changes in your menstrual cycle    difficulty breathing or shortness of breath    difficulty walking or talking    new breast lumps    numbness    pelvic pain or pressure    redness, blistering, peeling  or loosening of the skin, including inside the mouth    skin rash or itching (hives)    sudden chest pain    swelling of lips, face, or tongue    swelling, pain or tenderness in your calf or leg    unusual bruising or bleeding    vaginal discharge that is bloody, brown, or rust    weakness    yellowing of the whites of the eyes or skin  Side effects that usually do not require medical attention (report to your doctor or health care professional if they continue or are bothersome):    fatigue    hair loss, although uncommon and is usually mild    headache    hot flashes    impotence (in men)    nausea, vomiting (mild)    vaginal discharge (white or clear)  This list may not describe all possible side effects. Call your doctor for medical advice about side effects. You may report side effects to FDA at 2-899-FDA-9878.  Where should I keep my medicine?  Keep out of the reach of children.  Store at room temperature between 20 and 25 degrees C (68 and 77 degrees F). Protect from light. Keep container tightly closed. Throw away any unused medicine after the expiration date.  NOTE:This sheet is a summary. It may not cover all possible information. If you have questions about this medicine, talk to your doctor, pharmacist, or health care provider. Copyright  2015 Gold Standard

## 2021-06-16 NOTE — PROGRESS NOTES
Alice presents to [facility] for a post op appointment with Dr. Will .  She is accompanied by herself for appointment.  She states she is doing well, minimal pain.  She has good ROM, reviewed ROM exercises with her.  Patient met with Dr. Will .  See dictation for details of visit. MD removed her left drain, she will call when her right one is less than 20 ml a day for two days in a row.  She has a follow up appointment with Dr. Olsen tomorrow.   Invited calls.  RN time 12 mins

## 2021-06-16 NOTE — ANESTHESIA CARE TRANSFER NOTE
Last vitals:   Vitals:    03/24/21 0946   BP: (P) 113/60   Pulse: (P) 88   Resp: (P) 14   Temp: (P) 36.2  C (97.1  F)   SpO2: (P) 99%     Pt brought to PACU on 10L facemask. Monitors applied. VSS upon arrival.    Patient's level of consciousness is drowsy  Spontaneous respirations: yes  Maintains airway independently: yes  Dentition unchanged: yes  Oropharynx: oropharynx clear of all foreign objects    QCDR Measures:  ASA# 20 - Surgical Safety Checklist: WHO surgical safety checklist completed prior to induction    PQRS# 430 - Adult PONV Prevention: 4558F - Pt received => 2 anti-emetic agents (different classes) preop & intraop  ASA# 8 - Peds PONV Prevention: NA - Not pediatric patient, not GA or 2 or more risk factors NOT present  PQRS# 424 - Dolores-op Temp Management: 4559F - At least one body temp DOCUMENTED => 35.5C or 95.9F within required timeframe  PQRS# 426 - PACU Transfer Protocol: - Transfer of care checklist used  ASA# 14 - Acute Post-op Pain: ASA14B - Patient did NOT experience pain >= 7 out of 10

## 2021-06-17 NOTE — PROGRESS NOTES
Alice comes in for continued follow-up of her bilateral mastectomies.  Overall feeling well.  Her drains are out.  She is slowly getting back to normal activities.    Physical exam:  Still very sensitive when I touch her on the chest wall but it seems like it is more like she is anticipating pain but then it really is not painful.  There is some scarring laterally below the axilla.  I showed her how to do some gentle massage to try to get some of this contracture to release.  The incisions themselves look great.  No signs of infection.    Impression: Status post bilateral mastectomies for right breast cancer that she received neoadjuvant chemotherapy.  She is now on an aromatase inhibitor and tolerating that well.  I encouraged her to be getting back to normal activities.  She is a very active person so I also suggested that she look into the [employer].  I think she would enjoy that.  She is not really interested in prostheses.  She states that she is okay with just being flat.  Told her she changes her mind she can let me know and we can put in the referral.    Plan: Follow-up with me in 6 months just to be sure she is doing well.

## 2021-06-17 NOTE — TELEPHONE ENCOUNTER
Refill Approved    Rx renewed per Medication Renewal Policy. Medication was last renewed on 9/28/20.    Lowell Rhodes, [facility] Triage/Med Refill 4/29/2021     Requested Prescriptions   Pending Prescriptions Disp Refills     lisinopriL-hydrochlorothiazide (PRINZIDE,ZESTORETIC) 10-12.5 mg per tablet [Pharmacy Med Name: LISINOPRIL-HCTZ 10-12.5 MG TAB] 90 tablet 1     Sig: TAKE 1 TABLET BY MOUTH EVERY DAY       Diuretics/Combination Diuretics Refill Protocol  Passed - 4/28/2021  2:21 AM        Passed - Visit with PCP or prescribing provider visit in past 12 months     Last office visit with prescriber/PCP: 9/28/2020 Elinor Carroll FNP OR same dept: 9/28/2020 Elinor Carroll FNP OR same specialty: 9/28/2020 Elinor Carroll FNP  Last physical: Visit date not found Last MTM visit: Visit date not found   Next visit within 3 mo: Visit date not found  Next physical within 3 mo: Visit date not found  Prescriber OR PCP: LIZETTE Wilkes  Last diagnosis associated with med order: 1. Essential hypertension  - lisinopriL-hydrochlorothiazide (PRINZIDE,ZESTORETIC) 10-12.5 mg per tablet [Pharmacy Med Name: LISINOPRIL-HCTZ 10-12.5 MG TAB]; TAKE 1 TABLET BY MOUTH EVERY DAY  Dispense: 90 tablet; Refill: 1    If protocol passes may refill for 12 months if within 3 months of last provider visit (or a total of 15 months).             Passed - Serum Potassium in past 12 months      Lab Results   Component Value Date    Potassium 3.8 03/03/2021             Passed - Serum Sodium in past 12 months      Lab Results   Component Value Date    Sodium 140 03/03/2021             Passed - Blood pressure on file in past 12 months     BP Readings from Last 1 Encounters:   04/07/21 118/77             Passed - Serum Creatinine in past 12 months      Creatinine   Date Value Ref Range Status   03/03/2021 0.72 0.60 - 1.10 mg/dL Final

## 2021-06-17 NOTE — TELEPHONE ENCOUNTER
Alice telephoned to talk about cancer survivorship and some questions on her mind.  The following topics were discussed:    Cancer Survivorship Program at [facility]--also e-mailed her links to [facility] recorded and available on YouTube, as well as YouTube entries from the Survivorship Conference held on 4/17/21.      She had many questions about nutrition. Again, the survivorship series and conference were suggested as great resources.  Also gaver her information about Jessica Fonseca, MPH, RD, , LDN website and podcasts, and HEAL Well booklet as pdf attached to e-mail.  In addition a link to healthful recipes available on the [employer] [place] [employer] was shared.    Alice had questions about deodorant and breast cancer.  Although no strong links have been made specifically to breast cancer a National [employer] [place] article about the topic was sent to Tika.  A link to another article about Electromagnetic Fields and Cancer from NCI was also sent to patient.      Finally she asked about using a bleaching product on her arm hair.  The specific product is Gisel Echevarria Extra Strength Crème Hair Bleach.  The Sister Study did show a possible link between personalhair dye use and breast cancer.   Articles from American Cancer Society and breastcancer.org were shared. Molly Tello RN

## 2021-06-17 NOTE — TELEPHONE ENCOUNTER
Patient calls in today stating that at her last appointment she and Dr. Olsen talked about starting endocrine therapy.  She states that this has not been sent to her pharmacy and she is wondering if there is something she needs to do.  She also had many questions about how to go on living life beyond cancer.  I did let her know that I would get her through to the nurse navigator to further discuss these.  I did let her know that we used to have a survivorship nurse navigator but at this point we do not have that.  I thought Molly would be the best next step to get her started with these questions.    I let patient know that I would touch base with Dr. Olsen regarding the tamoxifen prescription that he talked with her about and get it over to her pharmacy.  She verbalized understanding and was appreciative.    Georgie Mejia RN

## 2021-06-17 NOTE — PROGRESS NOTES
Alice presents to [facility] for a post op appointment with Dr. Will .  She is accompanied by herself for appointment.  She states she is doing well, minimal pain.  She has good ROM, reviewed ROM exercises with her.  Patient met with Dr. Will .  See dictation for details of visit.  She will plan to follow up soon with Med Onc and will see Dr. Will again in 6 mos. Support provided,  Invited calls sooner if she has any questions.  RN time 15 mins.

## 2021-06-18 NOTE — PATIENT INSTRUCTIONS - HE
Patient Instructions by Elia Olsen MD at 10/5/2020 10:45 AM     Author: Elia Olsen MD Service: -- Author Type: Physician    Filed: 10/5/2020 12:17 PM Encounter Date: 10/5/2020 Status: Signed    : Elia Olsen MD (Physician)       Patient Education   Patient Education   Patient Education     Doxorubicin Hydrochloride Solution for injection  What is this medicine?  DOXORUBICIN (dox oh MALIA bi sin) is a chemotherapy drug. It is used to treat many kinds of cancer like Hodgkin's disease, leukemia, non-Hodgkin's lymphoma, neuroblastoma, sarcoma, and Wilms' tumor. It is also used to treat bladder cancer, breast cancer, lung cancer, ovarian cancer, stomach cancer, and thyroid cancer.  This medicine may be used for other purposes; ask your health care provider or pharmacist if you have questions.  What should I tell my health care provider before I take this medicine?  They need to know if you have any of these conditions:    blood disorders    heart disease, recent heart attack    infection (especially a virus infection such as chickenpox, cold sores, or herpes)    irregular heartbeat    liver disease    recent or ongoing radiation therapy    an unusual or allergic reaction to doxorubicin, other chemotherapy agents, other medicines, foods, dyes, or preservatives    pregnant or trying to get pregnant    breast-feeding  How should I use this medicine?  This drug is given as an infusion into a vein. It is administered in a hospital or clinic by a specially trained health care professional. If you have pain, swelling, burning or any unusual feeling around the site of your injection, tell your health care professional right away.  Talk to your pediatrician regarding the use of this medicine in children. Special care may be needed.  Overdosage: If you think you have taken too much of this medicine contact a poison control center or emergency room at once.  NOTE: This medicine is only for you. Do not share  this medicine with others.  What if I miss a dose?  It is important not to miss your dose. Call your doctor or health care professional if you are unable to keep an appointment.  What may interact with this medicine?  Do not take this medicine with any of the following medications:    cisapride    droperidol    halofantrine    pimozide    zidovudine  This medicine may also interact with the following medications:    chloroquine    chlorpromazine    clarithromycin    cyclophosphamide    cyclosporine    erythromycin    medicines for depression, anxiety, or psychotic disturbances    medicines for irregular heart beat like amiodarone, bepridil, dofetilide, encainide, flecainide, propafenone, quinidine    medicines for seizures like ethotoin, fosphenytoin, phenytoin    medicines for nausea, vomiting like dolasetron, ondansetron, palonosetron    medicines to increase blood counts like filgrastim, pegfilgrastim, sargramostim    methadone    methotrexate    pentamidine    progesterone    vaccines    verapamil  Talk to your doctor or health care professional before taking any of these medicines:    acetaminophen    aspirin    ibuprofen    ketoprofen    naproxen  This list may not describe all possible interactions. Give your health care provider a list of all the medicines, herbs, non-prescription drugs, or dietary supplements you use. Also tell them if you smoke, drink alcohol, or use illegal drugs. Some items may interact with your medicine.  What should I watch for while using this medicine?  Your condition will be monitored carefully while you are receiving this medicine. You will need important blood work done while you are taking this medicine.  This drug may make you feel generally unwell. This is not uncommon, as chemotherapy can affect healthy cells as well as cancer cells. Report any side effects. Continue your course of treatment even though you feel ill unless your doctor tells you to stop.  Your urine may turn  red for a few days after your dose. This is not blood. If your urine is dark or brown, call your doctor.  In some cases, you may be given additional medicines to help with side effects. Follow all directions for their use.  Call your doctor or health care professional for advice if you get a fever, chills or sore throat, or other symptoms of a cold or flu. Do not treat yourself. This drug decreases your body's ability to fight infections. Try to avoid being around people who are sick.  This medicine may increase your risk to bruise or bleed. Call your doctor or health care professional if you notice any unusual bleeding.  Be careful brushing and flossing your teeth or using a toothpick because you may get an infection or bleed more easily. If you have any dental work done, tell your dentist you are receiving this medicine.  Avoid taking products that contain aspirin, acetaminophen, ibuprofen, naproxen, or ketoprofen unless instructed by your doctor. These medicines may hide a fever.  Men and women of childbearing age should use effective birth control methods while using taking this medicine. Do not become pregnant while taking this medicine. There is a potential for serious side effects to an unborn child. Talk to your health care professional or pharmacist for more information. Do not breast-feed an infant while taking this medicine.  Do not let others touch your urine or other body fluids for 5 days after each treatment with this medicine. Caregivers should wear latex gloves to avoid touching body fluids during this time.  There is a maximum amount of this medicine you should receive throughout your life. The amount depends on the medical condition being treated and your overall health. Your doctor will watch how much of this medicine you receive in your lifetime. Tell your doctor if you have taken this medicine before.  What side effects may I notice from receiving this medicine?  Side effects that you should  report to your doctor or health care professional as soon as possible:    allergic reactions like skin rash, itching or hives, swelling of the face, lips, or tongue    low blood counts - this medicine may decrease the number of white blood cells, red blood cells and platelets. You may be at increased risk for infections and bleeding.    signs of infection - fever or chills, cough, sore throat, pain or difficulty passing urine    signs of decreased platelets or bleeding - bruising, pinpoint red spots on the skin, black, tarry stools, blood in the urine    signs of decreased red blood cells - unusually weak or tired, fainting spells, lightheadedness    breathing problems    chest pain    fast, irregular heartbeat    mouth sores    nausea, vomiting    pain, swelling, redness at site where injected    pain, tingling, numbness in the hands or feet    swelling of ankles, feet, or hands    unusual bleeding or bruising  Side effects that usually do not require medical attention (report to your doctor or health care professional if they continue or are bothersome):    diarrhea    facial flushing    hair loss    loss of appetite    missed menstrual periods    nail discoloration or damage    red or watery eyes    red colored urine    stomach upset  This list may not describe all possible side effects. Call your doctor for medical advice about side effects. You may report side effects to FDA at 1-781-FDA-4151.  Where should I keep my medicine?  This drug is given in a hospital or clinic and will not be stored at home.  NOTE:This sheet is a summary. It may not cover all possible information. If you have questions about this medicine, talk to your doctor, pharmacist, or health care provider. Copyright  2015 [employer]             Cyclophosphamide injection  Brand Names: Cytoxan, Neosar  What is this medicine?  CYCLOPHOSPHAMIDE (sye kloe ZAKIYA fa mide) is a chemotherapy drug. It slows the growth of cancer cells. This medicine is  used to treat many types of cancer like lymphoma, myeloma, leukemia, breast cancer, and ovarian cancer, to name a few.  How should I use this medicine?  This drug is usually given as an injection into a vein or muscle or by infusion into a vein. It is administered in a hospital or clinic by a specially trained health care professional.  Talk to your pediatrician regarding the use of this medicine in children. Special care may be needed.  What side effects may I notice from receiving this medicine?  Side effects that you should report to your doctor or health care professional as soon as possible:    allergic reactions like skin rash, itching or hives, swelling of the face, lips, or tongue    low blood counts - this medicine may decrease the number of white blood cells, red blood cells and platelets. You may be at increased risk for infections and bleeding.    signs of infection - fever or chills, cough, sore throat, pain or difficulty passing urine    signs of decreased platelets or bleeding - bruising, pinpoint red spots on the skin, black, tarry stools, blood in the urine    signs of decreased red blood cells - unusually weak or tired, fainting spells, lightheadedness    breathing problems    dark urine    dizziness    palpitations    swelling of the ankles, feet, hands    trouble passing urine or change in the amount of urine    weight gain    yellowing of the eyes or skin  Side effects that usually do not require medical attention (report to your doctor or health care professional if they continue or are bothersome):    changes in nail or skin color    hair loss    missed menstrual periods    mouth sores    nausea, vomiting  What may interact with this medicine?  This medicine may interact with the following medications:    amiodarone    amphotericin B    azathioprine    certain antiviral medicines for HIV or AIDS such as protease inhibitors (e.g., indinavir, ritonavir) and zidovudine    certain blood pressure  medications such as benazepril, captopril, enalapril, fosinopril, lisinopril, moexipril, monopril, perindopril, quinapril, ramipril, trandolapril    certain cancer medications such as anthracyclines (e.g., daunorubicin, doxorubicin), busulfan, cytarabine, paclitaxel, pentostatin, tamoxifen, trastuzumab    certain diuretics such as chlorothiazide, chlorthalidone, hydrochlorothiazide, indapamide, metolazone    certain medicines that treat or prevent blood clots like warfarin    certain muscle relaxants such as succinylcholine    cyclosporine    etanercept    indomethacin    medicines to increase blood counts like filgrastim, pegfilgrastim, sargramostim    medicines used as general anesthesia    metronidazole    natalizumab  What if I miss a dose?  It is important not to miss your dose. Call your doctor or health care professional if you are unable to keep an appointment.  Where should I keep my medicine?  This drug is given in a hospital or clinic and will not be stored at home.  What should I tell my health care provider before I take this medicine?  They need to know if you have any of these conditions:    blood disorders    history of other chemotherapy    infection    kidney disease    liver disease    recent or ongoing radiation therapy    tumors in the bone marrow    an unusual or allergic reaction to cyclophosphamide, other chemotherapy, other medicines, foods, dyes, or preservatives    pregnant or trying to get pregnant    breast-feeding  What should I watch for while using this medicine?  Visit your doctor for checks on your progress. This drug may make you feel generally unwell. This is not uncommon, as chemotherapy can affect healthy cells as well as cancer cells. Report any side effects. Continue your course of treatment even though you feel ill unless your doctor tells you to stop.  Drink water or other fluids as directed. Urinate often, even at night.  In some cases, you may be given additional medicines  to help with side effects. Follow all directions for their use.  Call your doctor or health care professional for advice if you get a fever, chills or sore throat, or other symptoms of a cold or flu. Do not treat yourself. This drug decreases your body's ability to fight infections. Try to avoid being around people who are sick.  This medicine may increase your risk to bruise or bleed. Call your doctor or health care professional if you notice any unusual bleeding.  Be careful brushing and flossing your teeth or using a toothpick because you may get an infection or bleed more easily. If you have any dental work done, tell your dentist you are receiving this medicine.  You may get drowsy or dizzy. Do not drive, use machinery, or do anything that needs mental alertness until you know how this medicine affects you.  Do not become pregnant while taking this medicine or for 1 year after stopping it. Women should inform their doctor if they wish to become pregnant or think they might be pregnant. Men should not father a child while taking this medicine and for 4 months after stopping it. There is a potential for serious side effects to an unborn child. Talk to your health care professional or pharmacist for more information. Do not breast-feed an infant while taking this medicine.  This medicine may interfere with the ability to have a child. This medicine has caused ovarian failure in some women. This medicine has caused reduced sperm counts in some men. You should talk with your doctor or health care professional if you are concerned about your fertility.  If you are going to have surgery, tell your doctor or health care professional that you have taken this medicine.  NOTE:This sheet is a summary. It may not cover all possible information. If you have questions about this medicine, talk to your doctor, pharmacist, or health care provider. Copyright  2018 [employer]             Paclitaxel Solution for injection  What is  this medicine?  PACLITAXEL (CATHERINE li TAX el) is a chemotherapy drug. It targets fast dividing cells, like cancer cells, and causes these cells to die. This medicine is used to treat ovarian cancer, breast cancer, and other cancers.  This medicine may be used for other purposes; ask your health care provider or pharmacist if you have questions.  What should I tell my health care provider before I take this medicine?  They need to know if you have any of these conditions:    blood disorders    irregular heartbeat    infection (especially a virus infection such as chickenpox, cold sores, or herpes)    liver disease    previous or ongoing radiation therapy    an unusual or allergic reaction to paclitaxel, alcohol, polyoxyethylated castor oil, other chemotherapy agents, other medicines, foods, dyes, or preservatives    pregnant or trying to get pregnant    breast-feeding  How should I use this medicine?  This drug is given as an infusion into a vein. It is administered in a hospital or clinic by a specially trained health care professional.  Talk to your pediatrician regarding the use of this medicine in children. Special care may be needed.  Overdosage: If you think you have taken too much of this medicine contact a poison control center or emergency room at once.  NOTE: This medicine is only for you. Do not share this medicine with others.  What if I miss a dose?  It is important not to miss your dose. Call your doctor or health care professional if you are unable to keep an appointment.  What may interact with this medicine?  Do not take this medicine with any of the following medications:    disulfiram    metronidazole  This medicine may also interact with the following medications:    cyclosporine    diazepam    ketoconazole    medicines to increase blood counts like filgrastim, pegfilgrastim, sargramostim    other chemotherapy drugs like cisplatin, doxorubicin, epirubicin, etoposide, teniposide,  vincristine    quinidine    testosterone    vaccines    verapamil  Talk to your doctor or health care professional before taking any of these medicines:    acetaminophen    aspirin    ibuprofen    ketoprofen    naproxen  This list may not describe all possible interactions. Give your health care provider a list of all the medicines, herbs, non-prescription drugs, or dietary supplements you use. Also tell them if you smoke, drink alcohol, or use illegal drugs. Some items may interact with your medicine.  What should I watch for while using this medicine?  Your condition will be monitored carefully while you are receiving this medicine. You will need important blood work done while you are taking this medicine.  This drug may make you feel generally unwell. This is not uncommon, as chemotherapy can affect healthy cells as well as cancer cells. Report any side effects. Continue your course of treatment even though you feel ill unless your doctor tells you to stop.  In some cases, you may be given additional medicines to help with side effects. Follow all directions for their use.  Call your doctor or health care professional for advice if you get a fever, chills or sore throat, or other symptoms of a cold or flu. Do not treat yourself. This drug decreases your body's ability to fight infections. Try to avoid being around people who are sick.  This medicine may increase your risk to bruise or bleed. Call your doctor or health care professional if you notice any unusual bleeding.  Be careful brushing and flossing your teeth or using a toothpick because you may get an infection or bleed more easily. If you have any dental work done, tell your dentist you are receiving this medicine.  Avoid taking products that contain aspirin, acetaminophen, ibuprofen, naproxen, or ketoprofen unless instructed by your doctor. These medicines may hide a fever.  Do not become pregnant while taking this medicine. Women should inform their  doctor if they wish to become pregnant or think they might be pregnant. There is a potential for serious side effects to an unborn child. Talk to your health care professional or pharmacist for more information. Do not breast-feed an infant while taking this medicine.  Men are advised not to father a child while receiving this medicine.  What side effects may I notice from receiving this medicine?  Side effects that you should report to your doctor or health care professional as soon as possible:    allergic reactions like skin rash, itching or hives, swelling of the face, lips, or tongue    low blood counts - This drug may decrease the number of white blood cells, red blood cells and platelets. You may be at increased risk for infections and bleeding.    signs of infection - fever or chills, cough, sore throat, pain or difficulty passing urine    signs of decreased platelets or bleeding - bruising, pinpoint red spots on the skin, black, tarry stools, nosebleeds    signs of decreased red blood cells - unusually weak or tired, fainting spells, lightheadedness    breathing problems    chest pain    high or low blood pressure    mouth sores    nausea and vomiting    pain, swelling, redness or irritation at the injection site    pain, tingling, numbness in the hands or feet    slow or irregular heartbeat    swelling of the ankle, feet, hands  Side effects that usually do not require medical attention (report to your doctor or health care professional if they continue or are bothersome):    bone pain    complete hair loss including hair on your head, underarms, pubic hair, eyebrows, and eyelashes    changes in the color of fingernails    diarrhea    loosening of the fingernails    loss of appetite    muscle or joint pain    red flush to skin    sweating  This list may not describe all possible side effects. Call your doctor for medical advice about side effects. You may report side effects to FDA at  1-800-FDA-1088.  Where should I keep my medicine?  This drug is given in a hospital or clinic and will not be stored at home.  NOTE:This sheet is a summary. It may not cover all possible information. If you have questions about this medicine, talk to your doctor, pharmacist, or health care provider. Copyright  2015 [employer]

## 2021-06-18 NOTE — PROGRESS NOTES
[facility] Note  Patient Name: Jes Mistry  Patient Age: 43 y.o.  YOB: 1975  MRN: [id number]  ?  Date of Visit: 5/22/2018  Reason for Office Visit:   Chief Complaint   Patient presents with     Pain     by her sternum. feels like a muscle tenson and like a side ache from running. x1 week. yesterday every 4 minutes it would tighten then release       HPI: Jes Mistry 43 y.o. female who presents to clinic for left sternal chest pain x 1 week, 1-2/10, tightness feeling, comes and goes, lasting 10 min, noticed a little fluttering, no shortness of breath, ACHARYA, diaphoresis, no radiation. Pain came suddenly at rest. Not exertional. She has been stressed out recently and thinks this could be contributing. Was raking yard over weekend, not much else exercise.     She has HTN and this is well controlled on medication. She does not monitor her BP at home    Review of Systems: As noted in HPI     Current Scheduled Meds:  Outpatient Encounter Prescriptions as of 5/22/2018   Medication Sig Dispense Refill     diphenhydrAMINE (BENADRYL) 25 mg capsule Take 25 mg by mouth every 6 (six) hours as needed for itching.       lisinopril-hydrochlorothiazide (PRINZIDE,ZESTORETIC) 10-12.5 mg per tablet Take 1 tablet by mouth daily. 90 tablet 3     No facility-administered encounter medications on file as of 5/22/2018.        Objective / Physical Examination:  /62 (Patient Site: Right Arm, Patient Position: Sitting, Cuff Size: Adult Regular)  Pulse 81  Wt 127 lb 3.2 oz (57.7 kg)  LMP 05/12/2018  SpO2 98%  BMI 22.18 kg/m2  Wt Readings from Last 3 Encounters:   05/22/18 127 lb 3.2 oz (57.7 kg)   10/19/17 131 lb (59.4 kg)   08/01/16 128 lb (58.1 kg)     Body mass index is 22.18 kg/(m^2). (>25?)    General Appearance: Alert and oriented in no acute distress  Chest: some tenderness over left mid sternum at intercostal muscles   Lungs: Clear to auscultation bilaterally. Normal inspiratory and  expiratory effort. No w/r/r  Cardiovascular: RRR S1, S2. No m/r/g  Abdomen: Soft, non-tender.   Extremities: No edema  Integumentary: Warm and dry  Neuro: Alert and oriented, follows commands appropriately      Assessment / Plan / Medical Decision Making:      Encounter Diagnoses   Name Primary?     Chest pain Yes     Heart palpitations         1. Chest pain  2. Heart palpitations    Vitals stable, exam normal, EKG done today showed nsr no issues noted. Suspect this is msk and recommend she try ibuprofen 600 mg q8h prn and observe. If pain does not improve or worsens return to clinic for reevaluation or if after hours go to UC or ED    Total time spent with patient was 15 minutes with >50% of time spent in face-to-face counseling regarding the above plan     Akil Urbina MD  [facility]

## 2021-06-20 NOTE — LETTER
Letter by Loretta Fortune RN at      Author: Loretta Fortune RN Service: -- Author Type: --    Filed:  Encounter Date: 10/2/2020 Status: (Other)       Dear Alice,    Thank you for choosing [facility] for your care.  We are committed to providing you with the highest quality and compassionate healthcare services.  The following information pertains to your first appointment with our clinic.    Date/Time of appointment:  Monday, 10/5/2020, check in at 10:30 a.m.      Name of your Physician:  Elia Olsen MD    What to bring to your appointment:    Completed Patient History/Initial Nursing Assessment and Medication/Allergy List (these forms were sent to you).    Any paperwork or films from your physician that we have asked you to bring.    Your current insurance card(s).    Parking:    Please refer to the map included to direct you to [facility].    You can park in any visitor/patient parking area you choose. There is no charge for parking at [facility].     Enter the hospital at the front/main entrance.      Please check in with our  representatives who will escort you to the clinic located in [address] of the [facility].    We hope these instructions are helpful to you.  If you have any questions or concerns, please call us at [phone].  It is our pleasure to assist you.    Warm Regards,  Molly Tello RN, OCN  Nurse Navigator  [phone]

## 2021-06-20 NOTE — LETTER
Letter by Cindy Michael CNP at      Author: Cindy Michael CNP Service: -- Author Type: --    Filed:  Encounter Date: 1/24/2020 Status: (Other)         Jes Mistry  [address] [facility] [address]             January 24, 2020         Dear Ms. Mistry,    Below are the results from your recent visit:    Resulted Orders   Basic Metabolic Panel   Result Value Ref Range    Sodium 138 136 - 145 mmol/L    Potassium 4.2 3.5 - 5.0 mmol/L    Chloride 101 98 - 107 mmol/L    CO2 28 22 - 31 mmol/L    Anion Gap, Calculation 9 5 - 18 mmol/L    Glucose 84 70 - 125 mg/dL    Calcium 10.0 8.5 - 10.5 mg/dL    BUN 14 8 - 22 mg/dL    Creatinine 0.69 0.60 - 1.10 mg/dL    GFR MDRD Af Amer >60 >60 mL/min/1.73m2    GFR MDRD Non Af Amer >60 >60 mL/min/1.73m2    Narrative    Fasting Glucose reference range is 70-99 mg/dL per  American Diabetes Association (ADA) guidelines.   Lipid Cascade RANDOM   Result Value Ref Range    Cholesterol 189 <=199 mg/dL    Triglycerides 347 (H) <=149 mg/dL    HDL Cholesterol 35 (L) >=50 mg/dL    LDL Calculated 85 <=129 mg/dL    Patient Fasting > 8hrs? Unknown    Thyroid Stimulating Hormone (TSH)   Result Value Ref Range    TSH <0.01 (L) 0.30 - 5.00 uIU/mL   HM1 (CBC with Diff)   Result Value Ref Range    WBC 6.8 4.0 - 11.0 thou/uL    RBC 5.00 3.80 - 5.40 mill/uL    Hemoglobin 13.9 12.0 - 16.0 g/dL    Hematocrit 42.3 35.0 - 47.0 %    MCV 85 80 - 100 fL    MCH 27.8 27.0 - 34.0 pg    MCHC 32.9 32.0 - 36.0 g/dL    RDW 13.1 11.0 - 14.5 %    Platelets 284 140 - 440 thou/uL    MPV 10.8 8.5 - 12.5 fL    Neutrophils % 63 50 - 70 %    Lymphocytes % 26 20 - 40 %    Monocytes % 9 2 - 10 %    Eosinophils % 2 0 - 6 %    Basophils % 0 0 - 2 %    Neutrophils Absolute 4.3 2.0 - 7.7 thou/uL    Lymphocytes Absolute 1.8 0.8 - 4.4 thou/uL    Monocytes Absolute 0.6 0.0 - 0.9 thou/uL    Eosinophils Absolute 0.1 0.0 - 0.4 thou/uL    Basophils Absolute 0.0 0.0 - 0.2 thou/uL   T4, Free   Result Value Ref  Range    Free T4 1.2 0.7 - 1.8 ng/dL   T4, Total   Result Value Ref Range    T4, Total 9.2 4.5 - 13.0 ug/dL   T3, Total   Result Value Ref Range    T3, Total 100 45 - 175 ng/dL        Please see the remainder of your lab work which looks good I recommend completion of the  thyroid ultrasound with possible follow-up with endocrinology dependent upon ultrasound  results.     Please call with questions or contact us using [employer].    Sincerely,        Electronically signed by Cindy Michael CNP

## 2021-06-20 NOTE — LETTER
Letter by Elio Gilbert MD at      Author: Elio Gilbert MD Service: -- Author Type: --    Filed:  Encounter Date: 1/17/2020 Status: Signed       Jes Mistry  [address] [place] [id number]      01/17/20    Dear Jes,    It looks like you are due for a follow up for hypertension.     We would like to inform you of the list of providers at [facility] that are currently taking new patients:      Cindy Michael CNP - Internal Medicine   Dr. Flaca Cervantes -Internal Medicine   Dr. Jemma Dumont - Family Medicine   ABIODUN Resendez - Family Medicine   Dr. Mikael Sanchez - Family Medicine        The [facility] strives to see all patients in a timely manner and we need your help to achieve this.  The above mentioned providers do have openings in the future and look forward to seeing you.    Please call [phone] and one of our skilled patient access representatives will accommodate your scheduling needs.        Sincerely,    Yvette Harman LPN - [place]  [facility]  [address] [address]  [phone]

## 2021-06-20 NOTE — LETTER
Letter by Cindy Michael CNP at      Author: Cindy Michael CNP Service: -- Author Type: --    Filed:  Encounter Date: 1/24/2020 Status: (Other)         Jes Mistry  [address] [facility] [address]             January 24, 2020         Dear Ms. Mistry,    Below are the results from your recent visit:    Resulted Orders   Basic Metabolic Panel   Result Value Ref Range    Sodium 138 136 - 145 mmol/L    Potassium 4.2 3.5 - 5.0 mmol/L    Chloride 101 98 - 107 mmol/L    CO2 28 22 - 31 mmol/L    Anion Gap, Calculation 9 5 - 18 mmol/L    Glucose 84 70 - 125 mg/dL    Calcium 10.0 8.5 - 10.5 mg/dL    BUN 14 8 - 22 mg/dL    Creatinine 0.69 0.60 - 1.10 mg/dL    GFR MDRD Af Amer >60 >60 mL/min/1.73m2    GFR MDRD Non Af Amer >60 >60 mL/min/1.73m2    Narrative    Fasting Glucose reference range is 70-99 mg/dL per  American Diabetes Association (ADA) guidelines.   Lipid Cascade RANDOM   Result Value Ref Range    Cholesterol 189 <=199 mg/dL    Triglycerides 347 (H) <=149 mg/dL    HDL Cholesterol 35 (L) >=50 mg/dL    LDL Calculated 85 <=129 mg/dL    Patient Fasting > 8hrs? Unknown    Thyroid Stimulating Hormone (TSH)   Result Value Ref Range    TSH <0.01 (L) 0.30 - 5.00 uIU/mL   HM1 (CBC with Diff)   Result Value Ref Range    WBC 6.8 4.0 - 11.0 thou/uL    RBC 5.00 3.80 - 5.40 mill/uL    Hemoglobin 13.9 12.0 - 16.0 g/dL    Hematocrit 42.3 35.0 - 47.0 %    MCV 85 80 - 100 fL    MCH 27.8 27.0 - 34.0 pg    MCHC 32.9 32.0 - 36.0 g/dL    RDW 13.1 11.0 - 14.5 %    Platelets 284 140 - 440 thou/uL    MPV 10.8 8.5 - 12.5 fL    Neutrophils % 63 50 - 70 %    Lymphocytes % 26 20 - 40 %    Monocytes % 9 2 - 10 %    Eosinophils % 2 0 - 6 %    Basophils % 0 0 - 2 %    Neutrophils Absolute 4.3 2.0 - 7.7 thou/uL    Lymphocytes Absolute 1.8 0.8 - 4.4 thou/uL    Monocytes Absolute 0.6 0.0 - 0.9 thou/uL    Eosinophils Absolute 0.1 0.0 - 0.4 thou/uL    Basophils Absolute 0.0 0.0 - 0.2 thou/uL        Your TSH is low and I  have added some additional lab work to what you had drawn yesterday.  I  also have ordered a thyroid ultrasound given these results and your last ultrasound was in  2013.  Please  schedule at your convenience.     Please call with questions or contact us using [employer].    Sincerely,        Electronically signed by Cindy Michael CNP

## 2021-06-20 NOTE — LETTER
Letter by Molly Tello RN at      Author: Molly Tello RN Service: -- Author Type: --    Filed:  Encounter Date: 10/6/2020 Status: (Other)         Jes Mistry  [address]  [facility] [address]                 October 6, 2020       Dear Alice:      Thanks for chatting with me today, albeit briefly!  I look forward to talking with you again soon.      Enclosed please find the wig resources I mentioned in our conversation.  The listing of wig establishments is often quite helpful to patients.  [facility] has several organizations we trust.    Not everyone chooses to invest in a wig, of course.  Scarves and hats are often a perfect solution for patients.  Check out Look Good Feel Better web site at [email or web address] for virtual workshops (under Program Finder) for workshops on Skincare and Makeup, Wigs and Head Coverings, and Body Image, Wardrobe, and Styling.      Please call me when things settle down for you a bit.    Warm regards,        Molly Tello, RN, BSN, OCN, CBCN  Cancer Care Navigator  [phone]

## 2021-06-21 NOTE — LETTER
Letter by Kaela Plunkett CNP at      Author: Kaela Plunkett CNP Service: -- Author Type: --    Filed:  Encounter Date: 11/4/2020 Status: (Other)                   Office Hours: Monday - Friday 8:00 - 4:30PM    Jes Mistry  [address]  [facility] [address]           November 4, 2020      Dear Jes:    Our clinic records indicate we have attempted to contact you three (3) or more times to schedule a genetics consult appointment. Unfortunately, we have been unable to reach you. To prevent further delays in your care, please contact our office to schedule your appointment.         Sincerely,        Genetics Scheduling  [phone]

## 2021-06-21 NOTE — PROGRESS NOTES
1. Essential hypertension    - lisinopril-hydrochlorothiazide (PRINZIDE,ZESTORETIC) 10-12.5 mg per tablet; Take 1 tablet by mouth daily.  Dispense: 90 tablet; Refill: 3  - Basic Metabolic Panel     Plan: She is doing very well on this medication, and I did give her a refill with 90 days and 3 refills.  We also did a basic metabolic profile today to see that her potassium is doing well.  We did discuss the idea of decreasing her medication only giving her 1 of the 2 components of the medication but for now, we will keep her on this medication.  I told her to watch for orthostatic hypotension symptoms or other dizziness or other signs that her blood pressure is getting too low and she will let us know.    Subjective: 43-year-old female who is here today for medication refill.  She has been on lisinopril hydrochlorothiazide for many years, and is takes very good care of her blood pressure.  She denies any symptoms of side effects from the medications.  She is compliant with the medication and takes it every day.  She tries to eat right and exercise when she can.  She denies any chest pain or shortness of breath or swelling in her extremities.    Objective: Well-appearing female in no acute distress.  Vital signs as noted.  Blood pressure is excellent today.  Chest clear to auscultation.  Heart regular rate and rhythm.  Extremities without significant edema.

## 2021-06-21 NOTE — LETTER
Letter by Rosa Arreola, Genetic Counselor at      Author: Rosa Arreola, Genetic Counselor Service: -- Author Type: --    Filed:  Encounter Date: 2/26/2021 Status: (Other)         Elia Olsen MD  [address] [place] [address]                                  February 26, 2021    Patient: Jes Mistry   MR Number: [id number]   YOB: 1975   Date of Visit: 2/26/2021     Dear Dr. Pretty MD:    Thank you for referring Jes Mistry to me for evaluation. Below are the relevant portions of my assessment and plan of care.    If you have questions, please do not hesitate to call me. I look forward to following Jes along with you.    Sincerely,        Rosa Arreola, Genetic Counselor          HELEN Davidson, DEWEY Tello, DOROTHEA Plunkett, DEWEY Michael, Rosa Orantes, Genetic Counselor  2/26/2021  2:02 PM  Sign when Signing Visit  2/26/2021    Referring Provider: Kaela Plunkett CNP    Presenting Information:  I spoke with Alice over the phone today to discuss her genetic testing results. Her blood was drawn on 1/21/2021.  [facility] testing was ordered from [facility]. This testing was done because of her personal history of breast cancer and family history of gastrointestinal cancers.    Genetic Testing Result: Multiple Variants of Uncertain Significance (VUS)  Jes was found to have 2 variants of uncertain significance (VUS).      BMPR1A p.G32R (c.94G>C)     RAD51D p.P214Q (c.641C>A)    No other variants or mutations were found in these genes. Given the uncertain significance of this result, medical management decisions should NOT be made based on this test result alone.    Of note, Jes is negative for pathogenic (harmful) mutations in the APC, DAGO, AXIN2, BARD1, BMPR1A, BRCA1, BRCA2, BRIP1, CDH1, CDK4, CDKN2A, CHEK2, DICER1, MLH1, MSH2, MSH3, MSH6, MUTYH, NBN, NF1, NTHL1, PALB2, PMS2, PTEN, RAD51C, RAD51D, RECQL,  "SMAD4, SMARCA4, STK11 and TP53 (sequencing and deletion/duplication); HOXB13, POLD1 and POLE (sequencing only); EPCAM and GREM1 (deletion/duplication only)  genes. No pathogenic (harmful) mutations were found in any of the 36 genes analyzed.     Testing did not detect an identifiable pathogenic (harmful) mutation associated with Hereditary Breast and Ovarian Cancer syndrome (BRCA1, BRCA2), Duarte syndrome (MLH1, MSH2, MSH6, PMS2, EPCAM), Familial Adenomatous Polyposis (APC), Hereditary Diffuse Gastric Cancer (CDH1), Familial Atypical Multiple Mole Melanoma syndrome (CDK4, CDKN2A), Juvenile Polyposis syndrome (BMPR1A, SMAD4), Cowden syndrome (PTEN), Li Fraumeni syndrome (TP53), Peutz-Jeghers syndrome (STK11), MUTYH Associated Polyposis (MUTYH), or Neurofibromatosis type 1 (NF1).    A copy of the test report can be found in the Media tab and named \"Genetics Scan-[facility]\". The report is scanned in as a linked document.    Interpretation:  We discussed several different interpretations of this inconclusive test result. It is not clear if any of these variants are associated with increased cancer risk. These variants may be benign changes that do not increased cancer risk, or they may be harmful mutations that cause increased risk for certain cancers.    BMPR1A p.G32R (c.94G>C)  Pathogenic (harmful) mutations in the BMPR1A gene cause Juvenile Polyposis Syndrome (JPS). Individuals with JPS have an increased risk for juvenile type polyps to form in the stomach and/or colon. Adults and children alike can have these polyps, even though they are called juvenile. Individuals with JPS are also at increased risk for colon and stomach cancers and possibly pancreatic and small bowel cancers. We discussed that there are specific screening and management recommendations for those with mutations in the BMPR1A gene. I reiterated that, at this time, the variant found in the BMPR1A gene for Jes is considered a variant of uncertain " significance and her medical management will NOT change based on this result alone.     RAD51D p.P214Q (c.641C>A)  Pathogenic (harmul) mutations in the RAD51D gene are associated with increased risk for ovarian cancer and possibly breast and prostate cancer. We discussed that there are specific screening and management recommendations for those with mutations in the RAD51D gene. I reiterated that, at this time, the variant found in the RAD51D gene for Jes is considered a variant of uncertain significance and her medical management will NOT change based on this result alone.     The laboratory is working to determine if any of these variants are harmful or benign, and they will contact me if any of them are reclassified. If these variants are determined to be benign, there may be a different gene or combination of genes and environment that are associated with the cancers in Jes and/or her relatives.      It is also important to consider that her other relatives with cancer history may have had a mutation in one of the genes tested and she did not inherit it. There is also a small possibility that there is a mutation in one of these genes, and we could not find it with our current testing methods.       Inheritance:  We reviewed the autosomal dominant inheritance of the variants in the BMPR1A and RAD51D genes.  We discussed that Jes has a 50% chance to pass each of these variants to each of her children. Likewise, there is a 50% chance for each of these variants to be present in each of her siblings. Because it is unclear what, if any, risk is associated with these variants, clinical genetic testing for these two variants alone is not recommended for relatives.    Family Studies:  The laboratory may offer additional research based testing for specific relatives in order to help reclassify these variants.    Screening:  Based on this inconclusive test result, it is important for Jes and her  relatives to refer back to the family history for appropriate cancer screening.      Alice should continue to follow her oncology team's recommendations for the treatment and follow-up for her breast cancer history.     Jess close female relatives remain at increased risk for breast cancer given their family history. Breast cancer screening is generally recommended to begin approximately 10 years younger than the earliest age of breast cancer diagnosis in the family, or at age 40, whichever comes first. In this family, screening may begin at age 35. Breast screening options should be discussed with an individual's primary care provider and a genetic counselor, to determine at what age to begin screening, what screening is appropriate, and if additional screening (such as breast MRI) is necessary based on personal/family history factors.     We discussed that Jes likely has some increased risk for gastrointestinal cancers given her maternal grandparents' histories, but routine screening is typically not recommended.  Jes is encouraged to discuss this history with her care providers.      Other population cancer screening options, such as those recommended by the American Cancer Society and the National Comprehensive Cancer Network (NCCN), are also appropriate for Jes and her family. These screening recommendations may change if there are changes to Jes's personal and/or family history. Final screening recommendations should be made by each individual's managing physician.     Additional Testing Considerations:  Although Jes's genetic testing result is inconclusive, other relatives may still carry a harmful gene mutation associated with pancreatic cancer. Genetic counseling is recommended for her mother and her maternal uncles to discuss genetic testing options. If any of these relatives do pursue genetic testing, Jes is encouraged to contact me so that we may review the impact of  their test results on her    Summary:  We do not have an explanation for her personal and family history of cancer. Because of that, it is important that she continue with cancer screening based on her personal and family history as discussed above.    Genetic testing is rapidly advancing, and new cancer susceptibility genes will most likely be identified in the future. Therefore, I encouraged Jes to contact me annually or if there are changes in her personal or family history. This may change how we assess her cancer risk, screening, and the testing we would offer.    Plan:  1.  I provided Jes with a copy of her test results today.    2. She plans to follow-up with her oncology team for the treatment for her breast cancer and her primary care providers for routine care.  3. She should contact me annually, or sooner if her family history changes.  4. I will contact Jes if the laboratory informs me that these variants have been reclassified.  This may change screening and testing recommendations for Jes and her relatives.    If Jes has any further questions, I encouraged her to contact me at [phone].      Time spent over the phone: 57 minutes    Rosa Arreola MS, [facility]  Licensed Genetic Counselor  [facility]  [phone]

## 2021-06-21 NOTE — LETTER
Letter by Rosa Arreola, Genetic Counselor at      Author: Rosa Arreola, Genetic Counselor Service: -- Author Type: --    Filed:  Encounter Date: 2/26/2021 Status: (Other)       VUS Handout    Genetic Testing  You had a blood test that looked at the genetic information in one or more genes associated with increased cancer risk.  The testing looked for any harmful changes that would stop this particular gene from working like it should.  It is important to remember that everyone has variants in multiple genes in their bodies that do not affect how the gene works.  These changes are benign and do not cause disease or increase cancer risk.  The term often used to describe these variants is benign polymorphism.  If an individual is found to have a benign polymorphism, their genetic test result is reported as Negative.    Results  There are three possible results of any genetic test:    Positive--a harmful mutation was identified    Negative--no mutation was identified    Variant of unknown significance--a variation in one of the genes was identified, but it is unclear how this impacts cancer risk in the family    Variant of Unknown Significance (VUS)  A VUS was identified in your blood sample.  Scientists currently do not know if this variant is harmful or if it is a benign polymorphism not associated with any increased risk of cancer.   There are several reasons for this lack of knowledge, but likely your VUS has either never been seen before or has only been seen in a small number of individuals.  Until your VUS is studied in more detail and/or seen in more families, scientists are not able to predict if it is a harmful mutation or a benign polymorphism.  Therefore, the cause of the cancer in you and your relatives is still unknown.          Reclassification  Genetic testing laboratories and researchers are constantly working to determine the importance of variants of unknown significance.  Most laboratories will  notify the genetic counselor when a patients VUS has been reclassified as a harmful mutation or a benign polymorphism.      Some families may be candidates for participation in the [employer] variant research programs to help determine the importance of their VUS.  Only the testing laboratory can decide who is eligible for participation.  Participating in these programs does not guarantee that families will learn the significance of their VUS immediately.  It could be months or years before a VUS is reclassified.       Screening Recommendations  Cancer screening recommendations for patients with a VUS should be based on their personal and family history rather than the VUS itself.  This may include increased cancer screening for certain individuals in the family.  Your genetic counselor and health care provider can help make appropriate recommendations for you and your relatives.      It is usually not recommended that other relatives have genetic testing for the VUS unless it is done as part of the laboratorys variant research program because an individuals test results should not influence their cancer screening until we determine the importance of the VUS.  Your genetic counselor can help you and your relatives understand the risks and benefits of all genetic testing and cancer screening options.    Please call us if you have any questions or concerns.     [facility]    Rosa Arreola Ms, [facility]   [phone]

## 2021-06-21 NOTE — LETTER
Letter by Rosa Arreola, Genetic Counselor at      Author: Rosa Arreola, Genetic Counselor Service: -- Author Type: --    Filed:  Encounter Date: 2/1/2021 Status: (Other)         Jes Mistry  [address]  [facility] [address]      February 1, 2021      Dear Ms. Mistry,    It was a pleasure speaking with you on the phone on 2/1/2021.  Here is a copy of the progress note from our discussion.  If you have any additional questions, please feel free to call.    I called Jes with her BRCA1 and BRCA2 results today.  Jes is NEGATIVE for mutations in the genes on the BRCAPlus panel by sequencing and deletion/duplication analysis (DAGO, BRCA1, BRCA2, CDH1, CHEK2, PALB2, PTEN, and TP53).     We rushed these genes so that she can make surgical decisions. Results for the remaining genes (CancerNext panel) are still pending and should be completed within the next 1-2 weeks.  I will call Alice with full results as soon as they are available.     Plan:  1. She plans to follow-up with her surgical and oncology teams.  2. She will return to clinic to discuss the remainder of the CancerNext panel, once results are available    If Jes has any further questions, I encouraged her to contact me at [phone].    Rosa Arreola MS, [facility]  Licensed Genetic Counselor  [facility]  [phone]

## 2021-06-21 NOTE — LETTER
"Letter by Rosa Arreola, Genetic Counselor at      Author: Rosa Arreola, Genetic Counselor Service: -- Author Type: --    Filed:  Encounter Date: 1/13/2021 Status: (Other)         Kimberly Will MD  [address] [address]                                  January 13, 2021    Patient: Jes Mistry   MR Number: [id number]   YOB: 1975   Date of Visit: 1/13/2021     Dear Dr. Nicolette MD:    Thank you for referring Jes Mistry to me for evaluation. Below are the relevant portions of my assessment and plan of care.    If you have questions, please do not hesitate to call me. I look forward to following Jes along with you.    Sincerely,        Rosa Arreola, Genetic Counselor          CC  MD Veronica Sampson, CNP  Molly Tello, DOROTHEA Plunkett, Rosa Orantes, Genetic Counselor  1/13/2021  3:18 PM  Sign when Signing Visit  1/13/2021     Jes Mistry is a 45 y.o. female who is being evaluated via a billable telephone visit.      The patient has been notified of following:     \"This telephone visit will be conducted via a call between you and your physician/provider. We have found that certain health care needs can be provided without the need for a physical exam.  This service lets us provide the care you need with a short phone conversation.  If a prescription is necessary we can send it directly to your pharmacy.  If lab work is needed we can place an order for that and you can then stop by our lab to have the test done at a later time.    Telephone visits are billed at different rates depending on your insurance coverage. During this emergency period, for some insurers they may be billed the same as an in-person visit.  Please reach out to your insurance provider with any questions.    If during the course of the call the physician/provider feels a telephone visit is not appropriate, you will not be charged for " "this service.\"    Patient has given verbal consent to a Telephone visit? Yes    What phone number would you like to be contacted at? [phone]    Patient would like to receive their AVS by AVS Preference: Vielka.    Referring Provider: Cindy Michael,*    Present for Today's Visit: Jes    Presenting Information:   I spoke with Jes Mistry over the phone today for genetic counseling to discuss her personal history of breast cancer and family history of gastrointestinal cancer.  She is here today to review this history, cancer screening recommendations, and available genetic testing options.    Personal History:  Jes is a 45 y.o. female. She was diagnosed with a right breast cancer in September 2020 due to lump that she picked up during a self-breast exam. 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 She has undergone neoadjuvant chemotherapy and is meeting with Dr. Will to discuss surgical options on 2/5/2021. Alice stated that the results from genetic testing will help determine whether bilateral mastectomy is the right surgical option for her.    She also reports a history of high blood pressure, a benign thyroid nodule, and a lipoma resection from the back of the neck.      She had her first menstrual period at age 11, her first child at age 35, and reports that she is premenopausal.  Jes has her ovaries, fallopian tubes and uterus in place, and she has had no ovarian cancer screening to date.  She reports no history of oral contraceptive use and that she has never been on hormone replacement therapy.      Her most recent OB-GYN exam and Pap smear in 2016 were normal.  She has annual clinical breast exams and mammograms. Her breast density has been reported as extremely dense.  She has not yet had a colonoscopy. She does not regularly do any other cancer " screening at this time.  Jes reported no tobacco use, and no alcohol use.    Family History: Alice's family history is significant for the following (Please see scanned pedigree for detailed family history information)    Alice has one son, age 10, who she reports has frequent nosebleeds but is otherwise healthy.    Alice has two brothers, ages 40 and 43, both healthy with no cancer history.     Alice's mother, age 65, has no reported cancer history.     Alice has a maternal half-uncle (her mother's paternal half-sister), age 53, was diagnosed with non-Hodgkin's lymphoma diagnosed at age 32.     Alice's maternal grandmother passed away at age 74 from a gastrointestinal cancer diagnosed at age 73. Alice reports that she believes it may have been a colon cancer. Alice also reports that her grandmother may have been diagnosed with another type of cancer at age 63.     Alice's maternal grandfather passed away at age 70 from pancreatic cancer diagnosed shorly before he passed.     Alice reports that her maternal grandmother's mother had a history of lung cancer. She was a smoker.     No cancer history reported in her maternal uncle and her maternal first-cousins.     Alice's father, age 65, has no reported cancer history.    No cancer history reported in four paternal aunts, four paternal uncles, paternal first-cousins, and paternal grandparents.     Her maternal ethnicity is Nigerian, Pat[place], and Wallisian. Her paternal ethnicity is Nigerian, Kosovan, and English.  There is no known Ashkenazi Sikh ancestry on either side of her family. There is no reported consanguinity.    Discussion:    Jes's personal history of breast cancer and family history of pancreatic cancer is suggestive of a hereditary cancer syndrome.    We reviewed the features of sporadic, familial, and hereditary cancers. In looking at Jes's family history, it is possible that a cancer susceptibility gene is present due to her early-onset triple  negative breast cancer and her paternal grandfather's pancreatic cancer.    We discussed the natural history and genetics of hereditary cancer. A detailed handout regarding the information we discussed will be sent to Jes via [facility]. Topics included: inheritance pattern, cancer risks, cancer screening recommendations, and also risks, benefits and limitations of testing.    We reviewed that the most common cause of hereditary breast cancer is Hereditary Breast and Ovarian Cancer (HBOC) syndrome, which is caused by mutations in the genes BRCA1 and BRCA2. BRCA1 and BRCA2 are two genes that increase the risk for breast and ovarian cancers, among others. Women who inherit a BRCA mutation have a 50 to 85% lifetime risk of breast cancer and up to a 40-58% lifetime risk of ovarian cancer. This is higher than the general population lifetime risks of 12% for breast cancer and less than 2% for ovarian cancer. Men with BRCA gene mutations have up to a 7% risk of breast cancer and 20% risk of prostate cancer. Other cancers, such as pancreatic cancer and melanoma, have also been associated with BRCA mutations.    Based on her personal and family history, Jes meets current National Comprehensive Cancer Network (NCCN) criteria for genetic testing of high-penetrance breast and/or ovarian cancer susceptibility genes.      We discussed that there are additional genes that could cause increased risk for breast and/or gastrointestinal cancer. As many of these genes present with overlapping features in a family and accurate cancer risk cannot always be established based upon the pedigree analysis alone, it would be reasonable for Jes to consider panel genetic testing to analyze multiple genes at once.    We discussed genetic testing options for Alice given her personal and family history. We discussed including genes associated with breat cancer risk (BRCANext-Expanded) and adding genes associated with gastrointestinal  cancer risk given her maternal grandparents' histories (CancerNext). After our discussion, Alice opted to proceed with CancerNext genetic testing through [facility].  Genetic testing is available for 36 genes associated with hereditary cancer: CancerNext (APC, DAGO, AXIN2, BARD1, BMPR1A, BRCA1, BRCA2, BRIP1, CDH1, CDK4, CDKN2A, CHEK2, DICER1, EPCAM, GREM1, HOXB13, MLH1, MSH2, MSH3, MSH6, MUTYH, NBN, NF1, NTHL1, PALB2, PMS2, POLD1, POLE, PTEN, RAD51C, RAD51D, RECQL, SMAD4, SMARCA4, STK11, and TP53).  We discussed that many of the genes in the CancerNext panel are associated with specific hereditary cancer syndromes and published management guidelines: Hereditary Breast and Ovarian Cancer syndrome (BRCA1, BRCA2), Duarte syndrome (MLH1, MSH2, MSH6, PMS2, EPCAM), Familial Adenomatous Polyposis (APC), Hereditary Diffuse Gastric Cancer (CDH1), Familial Atypical Multiple Mole Melanoma syndrome (CDK4, CDKN2A), Juvenile Polyposis syndrome (BMPR1A, SMAD4), Cowden syndrome (PTEN), Li Fraumeni syndrome (TP53), Peutz-Jeghers syndrome (STK11), MUTYH Associated Polyposis (MUTYH), and Neurofibromatosis type 1 (NF1).   The DAGO, AXIN2, BRIP1, CHEK2, GREM1, MSH3, NBN, NTHL1, PALB2, POLD1, POLE, RAD51C, and RAD51D genes are associated with increased cancer risk and have published management guidelines for certain cancers.    The remaining genes (BARD1, DICER1, HOXB13, RECQL, and SMARCA4) are associated with increased cancer risk and may allow us to make medical recommendations when mutations are identified.      Consent was obtained over the phone with no witness required due to the current covid19 global pandemic.    Medical Management: For Jes, we reviewed that the information from genetic testing may determine:    surgery to treat Jes's active cancer diagnosis (i.e. lumpectomy versus bilateral mastectomy, partial versus total colectomy, etc.),    additional cancer screening for which Jes may qualify (i.e. mammogram  and breast MRI, more frequent colonoscopies, more frequent dermatologic exams, etc.),    options for risk reducing surgeries Jes could consider (i.e. bilateral mastectomy, surgery to remove her ovaries and/or uterus, etc.),      and targeted chemotherapies for Jes's active cancer, or if she were to develop certain cancers in the future (i.e. immunotherapy for individuals with Duarte syndrome, PARP inhibitors, etc.).     These recommendations and possible targeted chemotherapies will be discussed in detail once genetic testing is completed.     We discussed that Jes's surgical decisions are pending genetic testing results. She has a surgical consult scheduled for 2/05/2021 with Dr. Will. For that reason, I will place a STAT request on the BRCAPlus genes (DAGO, BRCA1, BRCA2, CDH1, CHEK2, PALB2, PTEN, and TP53) in order to get these results back as soon as possible.     We discussed options for getting her blood drawn. Alice was encouraged to consider coming in as soon as she could to get her blood drawn for the genetic testing. She opted to wait until her upcoming infusion on 1/21/2021.     Plan:  1) Today Jes  elected to proceed with CancerNext genetic testing (36 genes) through [facility]. Her blood will be drawn for this genetic testing at her upcoming infusion appointment on 1/21/2021.  2) A STAT request was placed on the BRCAPlus genes (including BRCA1 and BRCA2) and we will get these results back in 7-10 days. I will call Jes with these results as soon as they are available.     Time spent over the phone: 68 minutes    Rosa Arreola MS, [facility]  Licensed Genetic Counselor  [facility]  [phone]

## 2021-06-21 NOTE — LETTER
Letter by Rosa Arreola, Genetic Counselor at      Author: Rosa Arreola, Genetic Counselor Service: -- Author Type: --    Filed:  Encounter Date: 1/13/2021 Status: (Other)       [facility]  Hereditary Breast and Gynecologic Cancers  Assessing Cancer Risk  Only about 5-10% of cancers are thought to be due to an inherited cancer susceptibility gene.    These families often have:    Several people with the same or related types of cancer    Cancers diagnosed at a young age (before age 50)    Individuals with more than one primary cancer    Multiple generations of the family affected with cancer    Some people may be candidates for genetic testing of more than one gene.  For these families, genetic testing using a cancer panel may be offered.  These panels will test different genes known to increase the risk for breast, ovarian, uterine, and/or other cancers. All of the genes discussed below have published clinical management guidelines for individuals who are found to carry a mutation. The purpose of this handout is to serve as a brief summary of the genes analyzed by the panels used to inquire about hereditary breast and gynecologic cancer:  DAGO, BRCA1, BRCA2, BRIP1, CDH1, CHEK2, MLH1, MSH2, MSH6, PMS2, EPCAM, PTEN, PALB2, RAD51C, RAD51D, and TP53.  ______________________________________________________________________________  Hereditary Breast and Ovarian Cancer Syndrome   (BRCA1 and BRCA2)  A single mutation in one of the copies of BRCA1 or BRCA2 increases the risk for breast and ovarian cancer, among others.  The risk for pancreatic cancer and melanoma may also be slightly increased in some families.  The chart below shows the chance that someone with a BRCA mutation would develop cancer in his or her lifetime1,2,3,4.      Lifetime Cancer Risks    General Population BRCA   Breast 12% ~80%   Ovarian 1-2% 11-40%   Male Breast <1% 7-8%   Prostate 16% 20%       A persons ethnic background is  also important to consider, as individuals of Ashkenazi Baptism ancestry have a higher chance of having a BRCA gene mutation.  There are three BRCA mutations that occur more frequently in this population.    Duarte Syndrome   (MLH1, MSH2, MSH6, PMS2, and EPCAM)  Currently five genes are known to cause Duarte Syndrome: MLH1, MSH2, MSH6, PMS2, and EPCAM.  A single mutation in one of the Duarte Syndrome genes increases the risk for colon, endometrial, ovarian, and stomach cancers.  Other cancers that occur less commonly in Duarte Syndrome include urinary tract, skin, and brain cancers.  The chart below shows the chance that a person with Duarte syndrome would develop cancer in his or her lifetime5.      Lifetime Cancer Risks    General Population Duarte Syndrome   Colon 5.5% ~80%   Endometrial 2.7% 15-60%   Stomach <1% 1-13%   Ovarian 1-2% 4-24%       Cowden Syndrome   (PTEN)  Cowden syndrome is a hereditary condition that increases the risk for breast, thyroid, endometrial, colon, and kidney cancer.  Cowden syndrome is caused by a mutation in the PTEN gene.  A single mutation in one of the copies of PTEN causes Cowden syndrome and increases cancer risk.  The chart below shows the chance that someone with a PTEN mutation would develop cancer in their lifetime6,7.  Other benign features seen in some individuals with Cowden syndrome include benign skin lesions (facial papules, keratoses, lipomas), learning disability, autism, thyroid nodules, colon polyps, and larger head size.      Lifetime Cancer Risks    General Population Cowden Synrome   Breast 12% 25-50%   Thyroid 1% Up to 35%   Renal 1-2% Up to 35%   Endometrial 2.7% Up to 28%   Colon  5.5% 9%   Melanoma 2-3% 6%   ** One recent study found breast cancer risk to be increased to 85%     Li-Fraumeni Syndrome   (TP53)  Li-Fraumeni Syndrome (LFS) is a cancer predisposition syndrome caused by a mutation in the TP53 gene. A single mutation in one of the copies of TP53 increases  the risk for multiple cancers. Individuals with LFS are at an increased risk for developing cancer at a young age. The lifetime risk for development of a LFS-associated cancer is 50% by age 30 and 90% by age 60.   Core Cancers: Sarcomas, Breast, Brain, Lung, Leukemias/Lymphomas, Adrenocortical carcinomas  Other Cancers: Gastrointestinal, Thyroid, Skin, Genitourinary    Hereditary Diffuse Gastric Cancer   (CDH1)  Currently, one gene is known to cause hereditary diffuse gastric cancer (HDGC): CDH1.  Individuals with HDGC are at increased risk for diffuse gastric cancer and lobular breast cancer. Of people diagnosed with HDGC, 30-50% have a mutation in the CDH1 gene.  This suggests there are likely other genes that may cause HDGC that have not been identified yet.      Lifetime Cancer Risks    General Population HDGC    Diffuse Gastric  <1% ~80%   Breast 12% 39-52%         Additional Genes  DAGO  DAGO is a moderate-risk breast cancer gene. Women who have a mutation in DAGO can have between a 2-4 fold increased risk for breast cancer compared to the general population8. DAGO mutations have also been associated with increased risk for pancreatic cancer, however an estimate of this cancer risk is not well understood9. Individuals who inherit two DAGO mutations have a condition called ataxia-telangiectasia (AT).  This rare autosomal recessive condition affects the nervous system and immune system, and is associated with progressive cerebellar ataxia beginning in childhood.  Individuals with ataxia-telangiectasia often have a weakened immune system and have an increased risk for childhood cancers.    PALB2  Mutations in PALB2 have been shown to increase the risk of breast cancer up to 33-58% in some families; where individuals fall within this risk range is dependent upon family gexuclu93. PALB2 mutations have also been associated with increased risk for pancreatic cancer, although this risk has not been quantified yet.   Individuals who inherit two PALB2 mutations--one from their mother and one from their father--have a condition called Fanconi Anemia.  This rare autosomal recessive condition is associated with short stature, developmental delay, bone marrow failure, and increased risk for childhood cancers.    CHEK2   CHEK2 is a moderate-risk breast cancer gene.  Women who have a mutation in CHEK2 have around a 2-fold increased risk for breast cancer compared to the general population, and this risk may be higher depending upon family history.11,12,13 Mutations in CHEK2 have also been shown to increase the risk of a number of other cancers, including colon and prostate, however these cancer risks are currently not well understood.    BRIP1, RAD51C and RAD51D  Mutations in BRIP1, RAD51C, and RAD51D have been shown to increase the risk of ovarian cancer and possibly female breast cancer as well14,15 .       Lifetime Cancer Risk    General Population BRIP1 RAD51C RAD51D   Ovarian 1-2% ~5-8% ~5-9% ~7-15%         Inheritance  All of the cancer syndromes reviewed above are inherited in an autosomal dominant pattern.  This means that if a parent has a mutation, each of his or her children will have a 50% chance of inheriting that same mutation.  Therefore, each child--male or female--would have a 50% chance of being at increased risk for developing cancer.    Mutations in some genes can occur de edmundo, which means that a persons mutation occurred for the first time in them and was not inherited from a parent.  Now that they have the mutation, however, it can be passed on to future generations.    Genetic Testing  Genetic testing involves a blood test and will look at the genetic information in the DAGO, BRCA1, BRCA2, BRIP1, CDH1, CHEK2, MLH1, MSH2, MSH6, PMS2, EPCAM, PTEN, PALB2, RAD51C, RAD51D, and TP53 genes for any harmful mutations that are associated with increased cancer risk.  If possible, it is recommended that the person(s) who has  had cancer be tested before other family members.  That person will give us the most useful information about whether or not a specific gene is associated with the cancer in the family.    Results  There are three possible results of genetic testing:    Positive--a harmful mutation was identified in one or more of the genes    Negative--no mutation was identified in any of the genes on this panel    Variant of unknown significance--a variation in one of the genes was identified, but it is unclear how this impacts cancer risk in the family    Advantages and Disadvantages   There are advantages and disadvantages to genetic testing.    Advantages    May clarify your cancer risk    Can help you make medical decisions    May explain the cancers in your family    May give useful information to your family members (if you share your results)    Disadvantages    Possible negative emotional impact of learning about inherited cancer risk    Uncertainty in interpreting a negative test result in some situations    Possible genetic discrimination concerns (see below)    Genetic Information Nondiscrimination Act (JACKIE)  JACKIE is a federal law that protects individuals from health insurance or employment discrimination based on a genetic test result alone.  Although rare, there are currently no legal discrimination protections in terms of life insurance, long term care, or disability insurances.  Visit the National Human Genome Research [place] website to learn more.    Reducing Cancer Risk  All of the genes described above have nationally recognized cancer screening guidelines that would be recommended for individuals who test positive.  In addition to increased cancer screening, surgeries may be offered or recommended to reduce cancer risk.  Recommendations are based upon an individuals genetic test result as well as their personal and family history of cancer.    Questions to Think About Regarding Genetic Testing:    What  effect will the test result have on me and my relationship with my family members if I have an inherited gene mutation?  If I dont have a gene mutation?    Should I share my test results, and how will my family react to this news, which may also affect them?    Are my children ready to learn new information that may one day affect their own health?    Hereditary Cancer Resources    FORCE: Facing Our Risk of Cancer Empowered facingourrisk.org   Bright Pink bebrightpink.org   Li-Fraumeni Syndrome Association lfsassociation.org   PTEN World PTENworld.com   No stomach for cancer, Inc. nostomachforcancer.org   Stomach cancer relief network Scrnet.org   Collaborative Group of the Americas on Inherited Colorectal Cancer (CGA) cgaicc.com    Cancer Care cancercare.org   American Cancer Society (ACS) cancer.org   National Cancer [place] (NCI) cancer.gov     Please call us if you have any questions or concerns.   Cancer Risk Management Program  q Nba Daley, MS, [facility] [phone]  q Virgen Jackson, MS, [facility] [phone]  q Yvette Donohue, MS, [facility] [phone]  q Keira Begum, MS, [facility] [phone]  q Rosa Arreola, MS, [facility] [phone]  q Eamon Rosario, MS, [facility] [phone]  q Karen Camargo, MS, [facility] [phone]      References  1. Mohan Ely PDP, Zain S, Marilyn NAVARRO, Macrina JE, Milo JL, Felicitaman N, Augustine H, Kp O, Red A, Ahmet B, Ranjan P, Radha S, Lonny DM, Huber N, Jael E, Dimitri H, Wells E, Clarenceinski J, Gronumesh J, Nuno B, Kaylee H, Mickylaclaudia S, Saulo H, Justen H, Nasrin K, Rahul OP. Average risks of breast and ovarian cancer associated with BRCA1 or BRCA2 mutations detected in case series unselected for family history: a combined analysis of 222 studies. Am J Hum Mindi. 2003;72:1117-30.  2. Jaime Rajany M, Dharmesh RAUSCH.  BRCA1 and BRCA2 Hereditary Breast and Ovarian Cancer. Gene Reviews online. 2013.  3. Chu YC, Ethan S, Zahra G, Amanda S. Breast cancer risk among male BRCA1 and  BRCA2 mutation carriers. J Natl Cancer Inst. 2007;99:1811-4.  4. Gurwinder PALACIOS, Lázaro I, Jj J, Ericka E, Grayson ER, Dorie F. Risk of breast cancer in male BRCA2 carriers. J Med Mindi. 2010;47:710-1.  5. National Comprehensive Cancer Network. Clinical practice guidelines in oncology, colorectal cancer screening. Available online (registration required). 2015.  6. Mcknight MH, Chao J, Catalina J, Elsa BALTAZAR, Larissa MS, Indira C. Lifetime cancer risks in individuals with germline PTEN mutations. Clin Cancer Res. 2012;18:400-7.  7. Pilarski R. Cowden Syndrome: A Critical Review of the Clinical Literature. J Mindi . 2009:18:13-27.  8. Too DIAZ, Tai D, Jeaneth S, Vernell P, Yash T, Tevin M, Francisco Javier B, Mary H, Melissa R, Queenie K, Matthew L, Gurwinder PALACIOS, Lonny D, Bar DF, Daphnie MR, The Breast Cancer Susceptibility Collaboration (UK) & Theo GARLAND. DAGO mutations that cause ataxia-telangiectasia are breast cancer susceptibility alleles. Nature Genetics. 2006;38:873-875  9. Abbe N , Tim Y, Che J, Gin L, Delfin GM , Henri ML, Gallinger S, Zelaya AG, Syngal S, Jorge ML, Fernando J , Jus R, Jabier SZ, Eslinda JR, Aurea VE, Cait M, Vogelstein B, Rodrigue N, Ogn RH, Adrian KW, and Schrader AP. DAGO mutations in patients with hereditary pancreatic cancer. Cancer Discover. 2012;2:41-46  10. Alex PALM, et al. Breast-Cancer Risk in Families with Mutations in PALB2. NEJM. 2014; 371(6):497-506.  11. CHEK2 Breast Cancer Case-Control Consortium. CHEK2*1100delC and susceptibility to breast cancer: A collaborative analysis involving 10,860 breast cancer cases and 9,065 controls from 10 studies. Am J Hum Mindi, 74 (2004), pp. 4292-0296  12. Gia T, Joana S, Wicho K, et al. Spectrum of Mutations in BRCA1, BRCA2, CHEK2, and TP53 in Families at High Risk of Breast Cancer. GYPSY. 2006;295(12):3208-1128.   13. Lanette LINARES, Hilton ANTONIO, Rene DIAZ, et al. Risk of breast cancer in women with a  CHEK2 mutation with and without a family history of breast cancer. J Clin Oncol. 2011;29:5250-4834.  14. Yuriy H, Gilebrto E, Cyrus SJ, et al. Contribution of germline mutations in the RAD51B, RAD51C, and RAD51D genes to ovarian cancer in the population. J Clin Oncol. 2015;33(26):9921-5167. Doi:10.1200/JCO.2015.61.2408.  15. Ramya T, Jada MCGRAW, Kaci P, et al. Mutations in BRIP1 confer high risk of ovarian cancer. Lula Mindi. 2011;43(11):1598-8066. doi:10.1038/ng.955.

## 2021-06-25 NOTE — TELEPHONE ENCOUNTER
SURV care plan sent to [facility] for patient to review.   Will follow up with any questions and provide any resources needed.    Bee MOHAMUD RN 6/14/2021 2:56 PM

## 2021-06-26 ENCOUNTER — HEALTH MAINTENANCE LETTER (OUTPATIENT)
Age: 46
End: 2021-06-26

## 2021-06-26 NOTE — PROGRESS NOTES
[facility] Hematology and Oncology Progress Note    Patient: Jes Mistry  MRN: [id number]  Date of Service: 06/16/2021      Assessment and Plan:    Cancer Staging  Malignant neoplasm of lower-inner quadrant of right breast of female, estrogen receptor negative (H)  Staging form: Breast, AJCC 8th Edition  - Clinical: Stage IIB (cT2, cN0, cM0, G3, ER-, MO-, HER2-) - Signed by Kaela Plunkett CNP on 10/9/2020    1.  Invasive ductal carcinoma:   Alice has been on adjuvant Tamoxifen for a few months. Tolerating this well with mild hot flashes.  Labwork WNL.    Return to clinic in 4 months with labs, exam.    No mammograms needed, bilateral mastectomy.    2.  LLL lung nodule  0.7 cm non-avid nodule on staging PET scan 10/2020. Will order follow-up CT chest before next visit in 4 months.      ECOG Performance   0    Distress Assessment  Distress Assessment Score: 2    Pain  Currently in Pain: No/denies    Diagnosis:    1.  Carcinoma of the right breast: High-grade.  6 o'clock position.  Diagnosed September 2020.  Estrogen receptor positive in 1%.  MO and HER-2 negative.  Measured 24 x 13 x 19 mm on ultrasound.  Ultrasound of the axilla was negative.  2 sentinel nodes are negative.    Treatment:    Neoadjuvant chemotherapy with Adriamycin and Cytoxan started October 9, 2020.  Weekly Taxol started December 3, 2020.    Bilateral mastectomy performed March 21, 2021: Final pathology R breast shows residual invasive ductal carcinoma, grade 2, 6 x 6 x 4 mm.  Negative margins.  ER positive at 30%.  HER-2/emily negative by FISH.    Adjuvant chemo not recommended. Given mastectomy, adj RT not recommended.    Adjuvant tamoxifen started 4/2021. (premenopausal)    Interim History:    Alice returns today for 2 month follow-up visit.   No acute complaints today.  Tolerating tamoxifen well with mild occasional daytime hot flashes. No musculoskeletal side effects.   Has not had return of her menstrual cycle.   Some chest wall  "tenderness, no lumps. No headaches. No new pain.   Good appetite. Energy improving, she's resumed running.   Neuropathy resolved post-chemo.      Past History:    Past Medical History:   Diagnosis Date     History of anesthesia complications     Slow to wake up     HTN (hypertension)      Nonsmoker      Thyroid nodule 2011     Physical Exam:    Recent Vitals 6/16/2021   Height 5' 3\"   Weight 132 lbs 10 oz   BSA (m2) 1.63 m2   /73   Pulse 81   Temp -   Temp src -   SpO2 95   Some recent data might be hidden     General: patient appears stated age of 46 y.o.. Nontoxic and in no distress.   HEENT: Head: atraumatic, normocephalic. Sclerae anicteric.  Chest:  Normal respiratory effort  Cardiac:  Reg rate/rhythm  Lymph: No palpable cervical nor axillary adenopathy.  Abdomen: abdomen is non-distended, no organomegaly.  Extremities: normal tone and muscle bulk.  Skin: no lesions or rash. Warm and dry.   CNS: alert and oriented. Grossly non-focal.   Psychiatric: normal mood and affect.     Lab Results:    CBC, CMP WNL    Imaging:    No results found.     Total time: 30 min, including review of EMR, diagnostics, documentation and ordering/coordination of care    Signed by: Veronica Davidson CNP    "

## 2021-06-26 NOTE — TELEPHONE ENCOUNTER
Attempted to contact patient.   Writer left message with phone number to call writer back.  Bee MOHAMUD RN Survivorship Coordinator 6/18/2021 3:23 PM

## 2021-06-26 NOTE — TELEPHONE ENCOUNTER
Attempted to call patient. Left message to return call to writer. Return phone number left on message.   Bee MOHAMUD RN Survivorship Coordinator 6/25/2021 11:59 AM

## 2021-06-26 NOTE — PROGRESS NOTES
"Oncology Rooming Note    06/16/21 9:43 AM    Jes Mistry is a 46 y.o. female who presents for:    Chief Complaint   Patient presents with     HE Cancer     Malignant neoplasm of lower-inner quadrant of right female breast, unspecified estrogen receptor status (H)       Initial Vitals: /73   Pulse 81   Ht 5' 3\" (1.6 m)   Wt 132 lb 9.6 oz (60.1 kg)   SpO2 95%   BMI 23.49 kg/m       Estimated body mass index is 23.49 kg/m  as calculated from the following:    Height as of this encounter: 5' 3\" (1.6 m).    Weight as of this encounter: 132 lb 9.6 oz (60.1 kg).     Body surface area is 1.63 meters squared.      Allergies reviewed: Yes  Medications reviewed: Yes    Refills needed: No    Clinical concerns: concerns - mild hot flashes    Elinor Rodriguez, [facility]      "

## 2021-07-03 NOTE — ANESTHESIA PREPROCEDURE EVALUATION
Anesthesia Preprocedure Evaluation by Delia Rogers MD at 3/24/2021  8:00 AM     Author: Delia Rogers MD Service: -- Author Type: Physician    Filed: 3/24/2021  8:15 AM Date of Service: 3/24/2021  8:00 AM Status: Signed    : Delia Rogers MD (Physician)       Anesthesia Evaluation      Patient summary reviewed   History of anesthetic complications (slow emergence)     Airway   Mallampati: II  Neck ROM: full   Pulmonary - negative ROS and normal exam                          Cardiovascular - negative ROS and normal exam  (+) hypertension well controlled, ,      Neuro/Psych - negative ROS     Endo/Other - negative ROS      GI/Hepatic/Renal - negative ROS      Other findings: Breast cancer        Dental    (+) bridge and caps                             Anesthesia Plan  Planned anesthetic: general LMA      ASA 2   Induction: intravenous   Anesthetic plan and risks discussed with: patient  Anesthesia plan special considerations: antiemetics,   Post-op plan: routine recovery        Echo: 10/6/2020    1. Normal left ventricular size and systolic performance with a visually estimated ejection fraction of 60-65%.   2. No significant valvular heart disease is identified on this study.   3. Normal right ventricular size and systolic performance.     Results for orders placed or performed in visit on 03/21/21   COVID-19 Virus PCR MRF    Specimen: Respiratory   Result Value Ref Range    COVID-19 VIRUS SPECIMEN SOURCE Nasopharyngeal     2019-nCOV       Test received-See reflex to IDDL test SARS CoV2 (COVID-19) Virus RT-PCR   SARS-CoV-2 (COVID-19)-PCR    Specimen: Respiratory   Result Value Ref Range    SARS-CoV-2 Virus Specimen Source Nasopharyngeal     SARS-CoV-2 PCR Result NEGATIVE     SARS-COV-2 PCR COMMENT       Testing was performed using the Aptima SARS-CoV-2 Assay on the "[employer]       Chemistry        Component Value Date/Time     03/03/2021 0953    K 3.8 03/03/2021 0953     03/03/2021  0953    CO2 28 03/03/2021 0953    BUN 12 03/03/2021 0953    CREATININE 0.72 03/03/2021 0953     03/03/2021 0953        Component Value Date/Time    CALCIUM 9.8 03/03/2021 0953    ALKPHOS 56 03/03/2021 0953    AST 26 03/03/2021 0953    ALT 46 (H) 03/03/2021 0953    BILITOT 0.4 03/03/2021 0953        Lab Results   Component Value Date    WBC 5.4 03/03/2021    HGB 13.3 03/03/2021    HCT 38.1 03/03/2021    MCV 95 03/03/2021     03/03/2021

## 2021-07-03 NOTE — ANESTHESIA PREPROCEDURE EVALUATION
Anesthesia Preprocedure Evaluation by Kip Orantes MD at 10/8/2020 10:31 AM     Author: Kip Orantes MD Service: -- Author Type: Physician    Filed: 10/8/2020 10:33 AM Date of Service: 10/8/2020 10:31 AM Status: Signed    : Kip Orantes MD (Physician)       Anesthesia Evaluation      Patient summary reviewed   History of anesthetic complications (slow emergence)     Airway   Mallampati: II  Neck ROM: full   Pulmonary - negative ROS and normal exam                          Cardiovascular - negative ROS and normal exam  (+) hypertension well controlled, ,      Neuro/Psych - negative ROS     Endo/Other - negative ROS      GI/Hepatic/Renal - negative ROS      Other findings: Breast cancer        Dental    (+) bridge and caps                           Anesthesia Plan  Planned anesthetic: MAC  MAC - propofol gtt, versed/fentanyl/ketamine bolus PRN for pain  Decadron/Zofran for antiemesis   Convert to General with LMA if unable to maintain adequate SpO2 on reduced FiO2 (<30%)  Fire precautions     ASA 2   Induction: intravenous   Anesthetic plan and risks discussed with: patient  Anesthesia plan special considerations: antiemetics,   Post-op plan: routine recovery      covid pcr negative 10/6/2020    Echo: 10/6/2020    1. Normal left ventricular size and systolic performance with a visually estimated ejection fraction of 60-65%.   2. No significant valvular heart disease is identified on this study.   3. Normal right ventricular size and systolic performance.     Results for orders placed or performed during the hospital encounter of 10/08/20   POCT Pregnancy (Beta-hCG, Qual), Urine (Point of Care) on DOS   Result Value Ref Range    POC Preg, Urine Negative Negative    POCT Kit Lot Number [id number]     POCT Kit Expiration Date 1/22     Pos Control Valid Control Valid Control    Neg Control Valid Control Valid Control    Dipstick Lot Number      Dipstick Expiration Date      POC Specific Gravity, Urine          Chemistry        Component Value Date/Time     01/23/2020 0929    K 4.2 01/23/2020 0929     01/23/2020 0929    CO2 28 01/23/2020 0929    BUN 14 01/23/2020 0929    CREATININE 0.69 01/23/2020 0929    GLU 84 01/23/2020 0929        Component Value Date/Time    CALCIUM 10.0 01/23/2020 0929    AST 21 08/01/2010 0501    ALT 20 08/01/2010 0501        Lab Results   Component Value Date    WBC 6.8 01/23/2020    HGB 13.9 01/23/2020    HCT 42.3 01/23/2020    MCV 85 01/23/2020     01/23/2020

## 2021-07-03 NOTE — ADDENDUM NOTE
Addendum Note by Elinor Dyson PharmD at 12/3/2020  9:30 AM     Author: Elinor Dyson PharmD Service: -- Author Type: Pharmacist    Filed: 12/3/2020  2:51 PM Encounter Date: 12/3/2020 Status: Signed    : Elinor Dyson PharmD (Pharmacist)    Addended by: ELINOR DYSON on: 12/3/2020 02:51 PM        Modules accepted: Orders

## 2021-07-04 NOTE — ADDENDUM NOTE
Addendum Note by Lombardi, Susan L, RN at 2/5/2021  9:40 AM     Author: Lombardi, Susan L, RN Service: -- Author Type: RN, Care Manager    Filed: 2/11/2021  4:04 PM Date of Service: 2/5/2021  9:40 AM Status: Signed    : Lombardi, Susan L, RN (RN, Care Manager)    Encounter addended by: Lombardi, Susan L, RN on: 2/11/2021  4:04 PM      Actions taken: Charge Capture section accepted

## 2021-07-04 NOTE — ADDENDUM NOTE
Addendum Note by Lombardi, Susan L, RN at 4/29/2021  9:20 AM     Author: Lombardi, Susan L, RN Service: -- Author Type: RN, Care Manager    Filed: 4/29/2021 11:58 AM Date of Service: 4/29/2021  9:20 AM Status: Signed    : Lombardi, Susan L, RN (RN, Care Manager)    Encounter addended by: Lombardi, Susan L, RN on: 4/29/2021 11:58 AM      Actions taken: Clinical Note Signed, Charge Capture section accepted

## 2021-07-06 VITALS
BODY MASS INDEX: 23.5 KG/M2 | SYSTOLIC BLOOD PRESSURE: 128 MMHG | OXYGEN SATURATION: 95 % | HEART RATE: 81 BPM | HEIGHT: 63 IN | WEIGHT: 132.6 LBS | DIASTOLIC BLOOD PRESSURE: 73 MMHG

## 2021-07-07 NOTE — TELEPHONE ENCOUNTER
"Patient returned phone call.   Reviewed treatment summary and purpose of summary.   Discussed resources available.  Explained to patient to call or send me a [employer] message if need of any resources.   Patient stated she is getting back in \"mom mode\" and things are going good. No needs at this time.   We discussed MyChart access and issues with MyChart.   Answered to the best of my knowledge about MyChart.   All questions answered.  Bee MOHAMUD RN Survivorship Coordinator 6/25/2021 12:59 PM      "

## 2021-07-22 NOTE — PROGRESS NOTES
[facility] Hematology and Oncology Progress Note    Patient: Jes Mistry  MRN: [id number]  Date of Service: 04/07/2021      Assessment and Plan:    Cancer Staging  Malignant neoplasm of lower-inner quadrant of right breast of female, estrogen receptor negative (H)  Staging form: Breast, AJCC 8th Edition  - Clinical: Stage IIB (cT2, cN0, cM0, G3, ER-, SD-, HER2-) - Signed by Kaela Plunkett CNP on 10/9/2020    1.  Invasive ductal carcinoma: She has completed adjuvant chemotherapy and has recently had surgery.  Final pathology shows a small component of residual tumor.  Subsequent hormonal staining shows ER positivity at 30% and HER-2/emily negativity.  As such, I do not think she needs to have adjuvant Xeloda as she is not triple negative.  We will initiate endocrine therapy.  She had a mastectomy so I do not think she will need any radiation.  We will start tamoxifen as she is premenopausal.  We again reviewed some of the more common side effects including, but not limited to, hot flashes, sweats, edema and weight gain.  Questions were answered.  We will see her back in clinic in about 2 months to see how she is doing.    ECOG Performance   ECOG Performance Status: 1    Distress Assessment  Distress Assessment Score: 1    Pain  Currently in Pain: No/denies    Diagnosis:    1.  Carcinoma of the right breast: High-grade.  6 o'clock position.  Diagnosed September 2020.  Estrogen receptor positive in 1%.  SD and HER-2 negative.  Measured 24 x 13 x 19 mm on ultrasound.  Ultrasound of the axilla was negative.  2 sentinel nodes are negative.    Treatment:    Neoadjuvant chemotherapy with Adriamycin and Cytoxan started October 9, 2020.  Weekly Taxol started December 3, 2020.    Right-sided mastectomy performed March 21, 2021: Final pathology shows residual invasive ductal carcinoma, grade 2, 6 x 6 x 4 mm.  Negative margins.  ER positive at 30%.  HER-2/emily negative by FISH.    Interim History:    Alice hernandez  today for follow-up visit.  She had surgery about 2 weeks ago.  She is recovering well.  No significant pain.  No acute complaints today.    Review of Systems:    Constitutional  Constitutional (WDL): Exceptions to WDL  Fatigue: Fatigue relieved by rest  Neurosensory  Neurosensory (WDL): All neurosensory elements are within defined limits  Cardiovascular  Cardiovascular (WDL): All cardiovascular elements are within defined limits  Pulmonary  Respiratory (WDL): Within Defined Limits  Gastrointestinal  Gastrointestinal (WDL): All gastrointestinal elements are within defined limits  Genitourinary  Genitourinary (WDL): All genitourinary elements are within defined limits  Integumentary  Integumentary (WDL): Exceptions to WDL(healing incisions)  Patient Coping  Patient Coping: Accepting  Accompanied by  Accompanied by: Alone    Past History:    Past Medical History:   Diagnosis Date     History of anesthesia complications     Slow to wake up     HTN (hypertension)      Nonsmoker      Thyroid nodule 2011     Physical Exam:    Recent Vitals 4/7/2021   Height -   Weight 129 lbs   BSA (m2) 1.62 m2   /77   Pulse 85   Temp 97.8   Temp src 1   SpO2 97   Some recent data might be hidden     General: patient appears stated age of 46 y.o.. Nontoxic and in no distress.   HEENT: Head: atraumatic, normocephalic. Sclerae anicteric.  Chest:  Normal respiratory effort  Cardiac:  No edema.   Abdomen: abdomen is non-distended  Extremities: normal tone and muscle bulk.  Skin: no lesions or rash. Warm and dry.   CNS: alert and oriented. Grossly non-focal.   Psychiatric: normal mood and affect.     Lab Results:    No labs    Imaging:    [place] Lymph Node Injection    Result Date: 3/24/2021  INDICATION: Breast Carcinoma. Pre-operative identification of the right sentinel lymph node. PROCEDURE: Informed consent was obtained from the patient. The skin was prepped with ChloraPrep. 462uCi microcuries of technetium-99m Lymphoseek was  injected subdermally along the upper outer aspect of the areolar margin. The patient tolerated this well.     [place] lymph node injection.      Signed by: Elia Olsen MD

## 2021-09-30 ENCOUNTER — TELEPHONE (OUTPATIENT)
Dept: ONCOLOGY | Facility: HOSPITAL | Age: 46
End: 2021-09-30

## 2021-09-30 NOTE — TELEPHONE ENCOUNTER
I call Alice to address some MyChart and CT scan concerns. Billing info is not coming up in her MyChart, I gave her the Billing department's number, [phone], Option 1. She was appreciative of this. In regards to her CT she has some questions about contrast. I was able to address her concerns.     I advised her to call us back, should she have any further questions/concerns.     Linda Brantley RN, Oncology Clinic   [facility]

## 2021-09-30 NOTE — TELEPHONE ENCOUNTER
Patient calls into the clinic today stating that she does have some general questions regarding upcoming appointments as well as some other miscellaneous items.  She is asking if Dr. Olsen nurse can give her a call back.  It appears that the patient does not yet have a care coordinator so I will send this to the collaborative team so that someone can get back to the patient.    Georgie Mejia RN

## 2021-10-08 ENCOUNTER — HOSPITAL ENCOUNTER (OUTPATIENT)
Dept: CT IMAGING | Facility: HOSPITAL | Age: 46
Setting detail: RADIATION/ONCOLOGY SERIES
End: 2021-10-08
Attending: NURSE PRACTITIONER
Payer: COMMERCIAL

## 2021-10-08 DIAGNOSIS — Z17.1 MALIGNANT NEOPLASM OF LOWER-INNER QUADRANT OF RIGHT BREAST OF FEMALE, ESTROGEN RECEPTOR NEGATIVE (H): ICD-10-CM

## 2021-10-08 DIAGNOSIS — R91.8 PULMONARY NODULES: ICD-10-CM

## 2021-10-08 DIAGNOSIS — C50.311 MALIGNANT NEOPLASM OF LOWER-INNER QUADRANT OF RIGHT BREAST OF FEMALE, ESTROGEN RECEPTOR NEGATIVE (H): ICD-10-CM

## 2021-10-08 PROCEDURE — 71250 CT THORAX DX C-: CPT

## 2021-10-12 ENCOUNTER — TELEPHONE (OUTPATIENT)
Dept: INTERNAL MEDICINE | Facility: CLINIC | Age: 46
End: 2021-10-12

## 2021-10-12 DIAGNOSIS — R73.9 ELEVATED BLOOD SUGAR: ICD-10-CM

## 2021-10-12 DIAGNOSIS — R79.89 LOW TSH LEVEL: ICD-10-CM

## 2021-10-12 DIAGNOSIS — I10 ESSENTIAL HYPERTENSION: Primary | ICD-10-CM

## 2021-10-12 NOTE — TELEPHONE ENCOUNTER
Pt called to schedule annual PHY with PCP    Scheduled for 11/10/2021    Pt will be having lab work done this Friday 10/15/21 at [facility].    Pt is wondering if Cindy Michael would like to place lab orders so that she will have results for her annual physical appt in November.

## 2021-10-15 ENCOUNTER — ONCOLOGY VISIT (OUTPATIENT)
Dept: ONCOLOGY | Facility: HOSPITAL | Age: 46
End: 2021-10-15
Attending: INTERNAL MEDICINE
Payer: COMMERCIAL

## 2021-10-15 ENCOUNTER — LAB (OUTPATIENT)
Dept: INFUSION THERAPY | Facility: HOSPITAL | Age: 46
End: 2021-10-15
Attending: INTERNAL MEDICINE
Payer: COMMERCIAL

## 2021-10-15 VITALS
SYSTOLIC BLOOD PRESSURE: 128 MMHG | WEIGHT: 131 LBS | HEART RATE: 81 BPM | OXYGEN SATURATION: 100 % | DIASTOLIC BLOOD PRESSURE: 64 MMHG | RESPIRATION RATE: 18 BRPM | TEMPERATURE: 98.2 F | BODY MASS INDEX: 23.21 KG/M2

## 2021-10-15 DIAGNOSIS — Z17.1 MALIGNANT NEOPLASM OF LOWER-INNER QUADRANT OF RIGHT BREAST OF FEMALE, ESTROGEN RECEPTOR NEGATIVE (H): ICD-10-CM

## 2021-10-15 DIAGNOSIS — C50.311 MALIGNANT NEOPLASM OF LOWER-INNER QUADRANT OF RIGHT BREAST OF FEMALE, ESTROGEN RECEPTOR NEGATIVE (H): ICD-10-CM

## 2021-10-15 DIAGNOSIS — R79.89 LOW TSH LEVEL: ICD-10-CM

## 2021-10-15 DIAGNOSIS — I10 ESSENTIAL HYPERTENSION: ICD-10-CM

## 2021-10-15 LAB
ALBUMIN SERPL-MCNC: 4.2 G/DL (ref 3.5–5)
ALP SERPL-CCNC: 87 U/L (ref 45–120)
ALT SERPL W P-5'-P-CCNC: 54 U/L (ref 0–45)
ANION GAP SERPL CALCULATED.3IONS-SCNC: 12 MMOL/L (ref 5–18)
AST SERPL W P-5'-P-CCNC: 40 U/L (ref 0–40)
BILIRUB SERPL-MCNC: 0.4 MG/DL (ref 0–1)
BUN SERPL-MCNC: 13 MG/DL (ref 8–22)
CALCIUM SERPL-MCNC: 10.6 MG/DL (ref 8.5–10.5)
CHLORIDE BLD-SCNC: 100 MMOL/L (ref 98–107)
CHOLEST SERPL-MCNC: 311 MG/DL
CO2 SERPL-SCNC: 29 MMOL/L (ref 22–31)
CREAT SERPL-MCNC: 0.72 MG/DL (ref 0.6–1.1)
GFR SERPL CREATININE-BSD FRML MDRD: >90 ML/MIN/1.73M2
GLUCOSE BLD-MCNC: 100 MG/DL (ref 70–125)
HDLC SERPL-MCNC: ABNORMAL MG/DL
LDLC SERPL CALC-MCNC: ABNORMAL MG/DL
POTASSIUM BLD-SCNC: 3.5 MMOL/L (ref 3.5–5)
PROT SERPL-MCNC: 8.2 G/DL (ref 6–8)
SODIUM SERPL-SCNC: 141 MMOL/L (ref 136–145)
T4 FREE SERPL-MCNC: 0.89 NG/DL (ref 0.7–1.8)
TRIGL SERPL-MCNC: 1937 MG/DL
TSH SERPL DL<=0.005 MIU/L-ACNC: 5.44 UIU/ML (ref 0.3–5)

## 2021-10-15 PROCEDURE — 84439 ASSAY OF FREE THYROXINE: CPT

## 2021-10-15 PROCEDURE — 82040 ASSAY OF SERUM ALBUMIN: CPT

## 2021-10-15 PROCEDURE — 99214 OFFICE O/P EST MOD 30 MIN: CPT | Performed by: INTERNAL MEDICINE

## 2021-10-15 PROCEDURE — 36415 COLL VENOUS BLD VENIPUNCTURE: CPT

## 2021-10-15 PROCEDURE — G0463 HOSPITAL OUTPT CLINIC VISIT: HCPCS

## 2021-10-15 PROCEDURE — 84478 ASSAY OF TRIGLYCERIDES: CPT

## 2021-10-15 PROCEDURE — 84443 ASSAY THYROID STIM HORMONE: CPT

## 2021-10-15 RX ORDER — TAMOXIFEN CITRATE 20 MG/1
20 TABLET ORAL DAILY
Qty: 90 TABLET | Refills: 3 | Status: SHIPPED | OUTPATIENT
Start: 2021-10-15 | End: 2022-10-06

## 2021-10-15 ASSESSMENT — PAIN SCALES - GENERAL: PAINLEVEL: NO PAIN (0)

## 2021-10-15 NOTE — LETTER
"    10/15/2021         RE: Jes Mistry  [id number] [facility] [address]        Dear Colleague,    Thank you for referring your patient, Jes Mistry, to [facility]. Please see a copy of my visit note below.    Oncology Rooming Note    October 15, 2021 9:26 AM   Jes Mistry is a 46 year old female who presents for:    Chief Complaint   Patient presents with     Oncology Clinic Visit     Malignant neoplasm of lower-inner quadrant of right breast of female, estrogen receptor negative (H)     Initial Vitals: /64   Pulse 81   Temp 98.2  F (36.8  C)   Resp 18   Wt 59.4 kg (131 lb)   SpO2 100%   BMI 23.21 kg/m   Estimated body mass index is 23.21 kg/m  as calculated from the following:    Height as of 6/16/21: 1.6 m (5' 3\").    Weight as of this encounter: 59.4 kg (131 lb). Body surface area is 1.62 meters squared.  No Pain (0) Comment: Data Unavailable   No LMP recorded.  Allergies reviewed: Yes  Medications reviewed: Yes    Medications: MEDICATION REFILLS NEEDED TODAY. Provider was notified.  Pharmacy name entered into [employer]: [id number] - [address]    Clinical concerns: None       Marina Paul              [facility] Hematology and Oncology Progress Note    Patient: Jes Mistry  MRN: [id number]  Date of Service: Oct 15, 2021        Assessment and Plan:    Cancer Staging  Malignant neoplasm of lower-inner quadrant of right breast of female, estrogen receptor negative (H)  Staging form: Breast, AJCC 8th Edition  - Clinical: Stage IIB (cT2, cN0, cM0, G3, ER-, IL-, HER2-) - Signed by Kaela Plunkett CNP on 10/9/2020     1.  Invasive ductal carcinoma: Overall tolerating tamoxifen well.  We will continue for at least 5 years.  She will let us know in the interim if she develops any symptoms.  We spent some time today reviewing her pathology pre and post chemotherapy.  We also reviewed " the rationale for clinical decision making regarding her treatment.  Questions were answered.    I spent 30 minutes in the care of this patient today, which included time necessary for preparation for the visit, face to face time with the patient, communication of recommendations to the care team, and documentation time.    ECOG Performance  0    Diagnosis:    1.  Carcinoma of the right breast: High-grade.  6 o'clock position.  Diagnosed September 2020. Estrogen receptor positive in 1%.  SD and HER-2 negative.  Measured 24 x 13 x 19 mm on ultrasound. Ultrasound of the axilla was negative.  2 sentinel nodes are negative.    Treatment:    Neoadjuvant chemotherapy with Adriamycin and Cytoxan started October 9, 2020.  Weekly Taxol started December 3, 2020.     Right-sided mastectomy performed March 21, 2021: Final pathology shows residual invasive ductal carcinoma, grade 2, 6 x 6 x 4 mm.  Negative margins.  ER positive at 30%.  HER-2/emily negative by FISH.    Tamoxifen initiated April 2021.    Interim History:    Alice is here today for a follow-up visit.  Overall she is doing okay.  Having some hot flashes on the tamoxifen.  These are generally stable.  No acute complaints today.    Review of Systems:    As above in the history.     Review of Systems otherwise Negative for:  General: chills, fever or night sweats  Psychological: anxiety or depression  Ophthalmic: blurry vision, double vision or loss of vision, vision change  ENT: epistaxis, oral lesions, hearing changes  Hematological and Lymphatic: bleeding, bruising, jaundice, swollen lymph nodes  Endocrine: unexpected weight changes  Respiratory: cough, hemoptysis, orthopnea or shortness of breath/ACHARYA  Cardiovascular: chest pain, edema, palpitations or PND  Gastrointestinal: abdominal pain, blood in stools, change in bowel habits, constipation, diarrhea or nausea/vomiting  Genito-Urinary: change in urinary stream, incontinence, frequency/urgency  Musculoskeletal:  joint pain, stiffness, swelling, muscle pain  Neurological: dizziness, headaches, numbness/tingling  Dermatological: lumps and rash    Past History:    Past Medical History:   Diagnosis Date     History of anesthesia complications     Slow to wake up     HTN (hypertension)      Nonsmoker      Thyroid nodule 2011     Physical Exam:    /64   Pulse 81   Temp 98.2  F (36.8  C)   Resp 18   Wt 59.4 kg (131 lb)   SpO2 100%   BMI 23.21 kg/m      General: patient appears stated age of 46 year old. Nontoxic and in no distress.   HEENT: Head: atraumatic, normocephalic. Sclerae anicteric.  Chest:  Normal respiratory effort  Cardiac:  No edema.   Abdomen: abdomen is non-distended  Extremities: normal tone and muscle bulk.  Skin: no lesions or rash on visible skin. Warm and dry.   CNS: alert and oriented. Grossly non-focal.   Psychiatric: normal mood and affect.     Lab Results:    No results found for this or any previous visit (from the past 168 hour(s)).     Imaging:    No results found.      Signed by: Elia Olsen MD        Again, thank you for allowing me to participate in the care of your patient.        Sincerely,        Elia Olsen MD

## 2021-10-15 NOTE — PROGRESS NOTES
"Oncology Rooming Note    October 15, 2021 9:26 AM   Jes Mistry is a 46 year old female who presents for:    Chief Complaint   Patient presents with     Oncology Clinic Visit     Malignant neoplasm of lower-inner quadrant of right breast of female, estrogen receptor negative (H)     Initial Vitals: /64   Pulse 81   Temp 98.2  F (36.8  C)   Resp 18   Wt 59.4 kg (131 lb)   SpO2 100%   BMI 23.21 kg/m   Estimated body mass index is 23.21 kg/m  as calculated from the following:    Height as of 6/16/21: 1.6 m (5' 3\").    Weight as of this encounter: 59.4 kg (131 lb). Body surface area is 1.62 meters squared.  No Pain (0) Comment: Data Unavailable   No LMP recorded.  Allergies reviewed: Yes  Medications reviewed: Yes    Medications: MEDICATION REFILLS NEEDED TODAY. Provider was notified.  Pharmacy name entered into [employer]: [id number] - [address]    Clinical concerns: None       Marina Paul            "

## 2021-10-16 ENCOUNTER — HEALTH MAINTENANCE LETTER (OUTPATIENT)
Age: 46
End: 2021-10-16

## 2021-11-04 DIAGNOSIS — I10 ESSENTIAL HYPERTENSION: ICD-10-CM

## 2021-11-04 NOTE — TELEPHONE ENCOUNTER
Pending Prescriptions:                       Disp   Refills    lisinopril-hydrochlorothiazide (ZESTORETI*90 tab*1

## 2021-11-07 NOTE — TELEPHONE ENCOUNTER
"Last Written Prescription Date:  4/29/21  Last Fill Quantity: 90,  # refills: 1   Last office visit provider:  1/23/20    Routing refill request to provider for review/approval because:  Patient needs to be seen because it has been more than 1 year since last office visit.        Requested Prescriptions   Pending Prescriptions Disp Refills     lisinopril-hydrochlorothiazide (ZESTORETIC) 10-12.5 MG tablet 90 tablet 1       Diuretics (Including Combos) Protocol Passed - 11/4/2021  6:27 PM        Passed - Blood pressure under 140/90 in past 12 months     BP Readings from Last 3 Encounters:   10/15/21 128/64   06/16/21 128/73   04/07/21 118/77                 Passed - Recent (12 mo) or future (30 days) visit within the authorizing provider's specialty     Patient has had an office visit with the authorizing provider or a provider within the authorizing providers department within the previous 12 mos or has a future within next 30 days. See \"Patient Info\" tab in inbasket, or \"Choose Columns\" in Meds & Orders section of the refill encounter.              Passed - Medication is active on med list        Passed - Patient is age 18 or older        Passed - No active pregancy on record        Passed - Normal serum creatinine on file in past 12 months     Recent Labs   Lab Test 10/15/21  0858   CR 0.72              Passed - Normal serum potassium on file in past 12 months     Recent Labs   Lab Test 10/15/21  0858   POTASSIUM 3.5                    Passed - Normal serum sodium on file in past 12 months     Recent Labs   Lab Test 10/15/21  0858                 Passed - No positive pregnancy test in past 12 months       ACE Inhibitors (Including Combos) Protocol Passed - 11/4/2021  6:27 PM        Passed - Blood pressure under 140/90 in past 12 months     BP Readings from Last 3 Encounters:   10/15/21 128/64   06/16/21 128/73   04/07/21 118/77                 Passed - Recent (12 mo) or future (30 days) visit within the " "authorizing provider's specialty     Patient has had an office visit with the authorizing provider or a provider within the authorizing providers department within the previous 12 mos or has a future within next 30 days. See \"Patient Info\" tab in inbasket, or \"Choose Columns\" in Meds & Orders section of the refill encounter.              Passed - Medication is active on med list        Passed - Patient is age 18 or older        Passed - No active pregnancy on record        Passed - Normal serum creatinine on file in past 12 months     Recent Labs   Lab Test 10/15/21  0858   CR 0.72       Ok to refill medication if creatinine is low          Passed - Normal serum potassium on file in past 12 months     Recent Labs   Lab Test 10/15/21  0858   POTASSIUM 3.5             Passed - No positive pregnancy test within past 12 months             Jossy Miller RN 11/07/21 12:22 PM  "

## 2021-11-09 RX ORDER — LISINOPRIL/HYDROCHLOROTHIAZIDE 10-12.5 MG
TABLET ORAL
Qty: 90 TABLET | Refills: 0 | Status: SHIPPED | OUTPATIENT
Start: 2021-11-09 | End: 2021-11-10

## 2021-11-10 ENCOUNTER — OFFICE VISIT (OUTPATIENT)
Dept: INTERNAL MEDICINE | Facility: CLINIC | Age: 46
End: 2021-11-10
Payer: COMMERCIAL

## 2021-11-10 VITALS
HEIGHT: 64 IN | SYSTOLIC BLOOD PRESSURE: 118 MMHG | HEART RATE: 72 BPM | BODY MASS INDEX: 22.11 KG/M2 | TEMPERATURE: 98.1 F | OXYGEN SATURATION: 98 % | DIASTOLIC BLOOD PRESSURE: 76 MMHG | WEIGHT: 129.5 LBS

## 2021-11-10 DIAGNOSIS — I10 ESSENTIAL HYPERTENSION: ICD-10-CM

## 2021-11-10 DIAGNOSIS — Z01.84 IMMUNITY STATUS TESTING: ICD-10-CM

## 2021-11-10 DIAGNOSIS — E78.1 HYPERTRIGLYCERIDEMIA: ICD-10-CM

## 2021-11-10 DIAGNOSIS — R73.9 ELEVATED BLOOD SUGAR: ICD-10-CM

## 2021-11-10 DIAGNOSIS — Z00.00 ENCOUNTER FOR ANNUAL PHYSICAL EXAM: ICD-10-CM

## 2021-11-10 DIAGNOSIS — D17.30 LIPOMA OF SKIN AND SUBCUTANEOUS TISSUE: ICD-10-CM

## 2021-11-10 DIAGNOSIS — Z12.4 SCREENING FOR CERVICAL CANCER: Primary | ICD-10-CM

## 2021-11-10 LAB
CHOLEST SERPL-MCNC: 305 MG/DL
FASTING STATUS PATIENT QL REPORTED: NO
HBA1C MFR BLD: 5.3 % (ref 0–5.6)
HDLC SERPL-MCNC: ABNORMAL MG/DL
LDLC SERPL CALC-MCNC: ABNORMAL MG/DL
TRIGL SERPL-MCNC: 2029 MG/DL

## 2021-11-10 PROCEDURE — 99396 PREV VISIT EST AGE 40-64: CPT | Performed by: NURSE PRACTITIONER

## 2021-11-10 PROCEDURE — 86769 SARS-COV-2 COVID-19 ANTIBODY: CPT | Mod: 90 | Performed by: NURSE PRACTITIONER

## 2021-11-10 PROCEDURE — 82465 ASSAY BLD/SERUM CHOLESTEROL: CPT | Performed by: NURSE PRACTITIONER

## 2021-11-10 PROCEDURE — 99000 SPECIMEN HANDLING OFFICE-LAB: CPT | Performed by: NURSE PRACTITIONER

## 2021-11-10 PROCEDURE — 84478 ASSAY OF TRIGLYCERIDES: CPT | Performed by: NURSE PRACTITIONER

## 2021-11-10 PROCEDURE — 87624 HPV HI-RISK TYP POOLED RSLT: CPT | Performed by: NURSE PRACTITIONER

## 2021-11-10 PROCEDURE — 36415 COLL VENOUS BLD VENIPUNCTURE: CPT | Performed by: NURSE PRACTITIONER

## 2021-11-10 PROCEDURE — G0123 SCREEN CERV/VAG THIN LAYER: HCPCS | Performed by: NURSE PRACTITIONER

## 2021-11-10 PROCEDURE — 83036 HEMOGLOBIN GLYCOSYLATED A1C: CPT | Performed by: NURSE PRACTITIONER

## 2021-11-10 RX ORDER — LISINOPRIL/HYDROCHLOROTHIAZIDE 10-12.5 MG
TABLET ORAL
Qty: 90 TABLET | Refills: 3 | Status: SHIPPED | OUTPATIENT
Start: 2021-11-10 | End: 2022-11-09

## 2021-11-10 SDOH — ECONOMIC STABILITY: INCOME INSECURITY: HOW HARD IS IT FOR YOU TO PAY FOR THE VERY BASICS LIKE FOOD, HOUSING, MEDICAL CARE, AND HEATING?: NOT VERY HARD

## 2021-11-10 SDOH — ECONOMIC STABILITY: TRANSPORTATION INSECURITY
IN THE PAST 12 MONTHS, HAS LACK OF TRANSPORTATION KEPT YOU FROM MEETINGS, WORK, OR FROM GETTING THINGS NEEDED FOR DAILY LIVING?: NO

## 2021-11-10 SDOH — ECONOMIC STABILITY: INCOME INSECURITY: IN THE LAST 12 MONTHS, WAS THERE A TIME WHEN YOU WERE NOT ABLE TO PAY THE MORTGAGE OR RENT ON TIME?: NO

## 2021-11-10 SDOH — ECONOMIC STABILITY: TRANSPORTATION INSECURITY
IN THE PAST 12 MONTHS, HAS THE LACK OF TRANSPORTATION KEPT YOU FROM MEDICAL APPOINTMENTS OR FROM GETTING MEDICATIONS?: NO

## 2021-11-10 SDOH — ECONOMIC STABILITY: FOOD INSECURITY: WITHIN THE PAST 12 MONTHS, YOU WORRIED THAT YOUR FOOD WOULD RUN OUT BEFORE YOU GOT MONEY TO BUY MORE.: SOMETIMES TRUE

## 2021-11-10 SDOH — HEALTH STABILITY: PHYSICAL HEALTH: ON AVERAGE, HOW MANY MINUTES DO YOU ENGAGE IN EXERCISE AT THIS LEVEL?: 10 MIN

## 2021-11-10 SDOH — HEALTH STABILITY: PHYSICAL HEALTH: ON AVERAGE, HOW MANY DAYS PER WEEK DO YOU ENGAGE IN MODERATE TO STRENUOUS EXERCISE (LIKE A BRISK WALK)?: 3 DAYS

## 2021-11-10 SDOH — ECONOMIC STABILITY: FOOD INSECURITY: WITHIN THE PAST 12 MONTHS, THE FOOD YOU BOUGHT JUST DIDN'T LAST AND YOU DIDN'T HAVE MONEY TO GET MORE.: NEVER TRUE

## 2021-11-10 ASSESSMENT — PATIENT HEALTH QUESTIONNAIRE - PHQ9
SUM OF ALL RESPONSES TO PHQ QUESTIONS 1-9: 1
10. IF YOU CHECKED OFF ANY PROBLEMS, HOW DIFFICULT HAVE THESE PROBLEMS MADE IT FOR YOU TO DO YOUR WORK, TAKE CARE OF THINGS AT HOME, OR GET ALONG WITH OTHER PEOPLE: NOT DIFFICULT AT ALL
SUM OF ALL RESPONSES TO PHQ QUESTIONS 1-9: 1
5. POOR APPETITE OR OVEREATING: NOT AT ALL

## 2021-11-10 ASSESSMENT — SOCIAL DETERMINANTS OF HEALTH (SDOH)
IN A TYPICAL WEEK, HOW MANY TIMES DO YOU TALK ON THE PHONE WITH FAMILY, FRIENDS, OR NEIGHBORS?: ONCE A WEEK
HOW OFTEN DO YOU ATTEND CHURCH OR RELIGIOUS SERVICES?: MORE THAN 4 TIMES PER YEAR
HOW OFTEN DO YOU GET TOGETHER WITH FRIENDS OR RELATIVES?: ONCE A WEEK
DO YOU BELONG TO ANY CLUBS OR ORGANIZATIONS SUCH AS CHURCH GROUPS UNIONS, FRATERNAL OR ATHLETIC GROUPS, OR SCHOOL GROUPS?: YES

## 2021-11-10 ASSESSMENT — ANXIETY QUESTIONNAIRES
2. NOT BEING ABLE TO STOP OR CONTROL WORRYING: NOT AT ALL
1. FEELING NERVOUS, ANXIOUS, OR ON EDGE: NOT AT ALL
5. BEING SO RESTLESS THAT IT IS HARD TO SIT STILL: NOT AT ALL
GAD7 TOTAL SCORE: 0
7. FEELING AFRAID AS IF SOMETHING AWFUL MIGHT HAPPEN: NOT AT ALL
GAD7 TOTAL SCORE: 0
7. FEELING AFRAID AS IF SOMETHING AWFUL MIGHT HAPPEN: NOT AT ALL
5. BEING SO RESTLESS THAT IT IS HARD TO SIT STILL: NOT AT ALL
7. FEELING AFRAID AS IF SOMETHING AWFUL MIGHT HAPPEN: NOT AT ALL
6. BECOMING EASILY ANNOYED OR IRRITABLE: NOT AT ALL
IF YOU CHECKED OFF ANY PROBLEMS ON THIS QUESTIONNAIRE, HOW DIFFICULT HAVE THESE PROBLEMS MADE IT FOR YOU TO DO YOUR WORK, TAKE CARE OF THINGS AT HOME, OR GET ALONG WITH OTHER PEOPLE: NOT DIFFICULT AT ALL
2. NOT BEING ABLE TO STOP OR CONTROL WORRYING: NOT AT ALL
3. WORRYING TOO MUCH ABOUT DIFFERENT THINGS: NOT AT ALL
1. FEELING NERVOUS, ANXIOUS, OR ON EDGE: NOT AT ALL
4. TROUBLE RELAXING: NOT AT ALL
8. IF YOU CHECKED OFF ANY PROBLEMS, HOW DIFFICULT HAVE THESE MADE IT FOR YOU TO DO YOUR WORK, TAKE CARE OF THINGS AT HOME, OR GET ALONG WITH OTHER PEOPLE?: NOT DIFFICULT AT ALL
6. BECOMING EASILY ANNOYED OR IRRITABLE: NOT AT ALL
3. WORRYING TOO MUCH ABOUT DIFFERENT THINGS: NOT AT ALL

## 2021-11-10 ASSESSMENT — LIFESTYLE VARIABLES
HOW OFTEN DO YOU HAVE SIX OR MORE DRINKS ON ONE OCCASION: NEVER
HOW MANY STANDARD DRINKS CONTAINING ALCOHOL DO YOU HAVE ON A TYPICAL DAY: PATIENT DECLINED
HOW OFTEN DO YOU HAVE A DRINK CONTAINING ALCOHOL: NEVER

## 2021-11-10 ASSESSMENT — MIFFLIN-ST. JEOR: SCORE: 1208.44

## 2021-11-10 NOTE — PROGRESS NOTES
SUBJECTIVE:   CC: Jes Mistry is an 46 year old woman who presents for preventive health visit.     Answers for HPI/ROS submitted by the patient on 11/10/2021  If you checked off any problems, how difficult have these problems made it for you to do your work, take care of things at home, or get along with other people?: Not difficult at all  PHQ9 TOTAL SCORE: 1  JAYDEN 7 TOTAL SCORE: 0      Patient has been advised of split billing requirements and indicates understanding: Yes  Mass            Lump on back of neck for many years, was removed 2012 and has grown back and is now irritating the past year or so, would like to discuss this being removed     Today's PHQ-2 Score: No flowsheet data found.    Abuse: Current or Past (Physical, Sexual or Emotional) - No  Do you feel safe in your environment? Yes    Have you ever done Advance Care Planning? (For example, a Health Directive, POLST, or a discussion with a medical provider or your loved ones about your wishes): No, advance care planning information given to patient to review.  Patient declined advance care planning discussion at this time.    Social History     Tobacco Use     Smoking status: Never Smoker     Smokeless tobacco: Never Used   Substance Use Topics     Alcohol use: No     If you drink alcohol do you typically have >3 drinks per day or >7 drinks per week? No    No flowsheet data found.    Reviewed orders with patient.  Reviewed health maintenance and updated orders accordingly -   BP Readings from Last 3 Encounters:   11/10/21 118/76   10/15/21 128/64   06/16/21 128/73    Wt Readings from Last 3 Encounters:   11/10/21 58.7 kg (129 lb 8 oz)   10/15/21 59.4 kg (131 lb)   06/16/21 60.1 kg (132 lb 9.6 oz)                  Patient Active Problem List   Diagnosis     HTN (hypertension)     Nonsmoker     Malignant neoplasm of lower-inner quadrant of right female breast, unspecified estrogen receptor status (H)     Chemotherapy-induced neutropenia (H)      Malignant neoplasm of lower-inner quadrant of right breast of female, estrogen receptor negative (H)     Past Surgical History:   Procedure Laterality Date     ABDOMEN SURGERY        SECTION       INSERT INTRACORONARY STENT       SC INSJ PRPH CTR VAD W/SUBQ PORT AGE 5 YR/> Left 10/8/2020    Procedure: Port Placement;  Surgeon: Kimberly Will MD;  Location: [facility];  Service: General     SC MASTECTOMY, SIMPLE, COMPLETE Bilateral 3/24/2021    Procedure: Bilateral Mastectomies; Right [place] Lymph Node Biopsy; Port Removal;  Surgeon: Kimberly Will MD;  Location: [facility];  Service: General     US BREAST CLIP PLACEMENT RIGHT Right 10/8/2020      SENTINEL NODE INJECTION  3/24/2021       Social History     Tobacco Use     Smoking status: Never Smoker     Smokeless tobacco: Never Used   Substance Use Topics     Alcohol use: No     Family History   Problem Relation Age of Onset     Hypertension Mother      Hypertension Father      Cancer Maternal Grandmother 70.00        unknown-abdominal?     Cancer Maternal Grandfather 70.00        liver?         Current Outpatient Medications   Medication Sig Dispense Refill     lisinopril-hydrochlorothiazide (ZESTORETIC) 10-12.5 MG tablet [LISINOPRIL-HYDROCHLOROTHIAZIDE (PRINZIDE,ZESTORETIC) 10-12.5 MG PER TABLET] TAKE 1 TABLET BY MOUTH EVERY DAY 90 tablet 3     tamoxifen (NOLVADEX) 20 MG tablet Take 1 tablet (20 mg) by mouth daily 90 tablet 3     Allergies   Allergen Reactions     Sulfa (Sulfonamide Antibiotics) [Sulfa Drugs] Unknown     Latex Itching       Breast Cancer Screening:  Any new diagnosis of family breast, ovarian, or bowel cancer? No    FHS-7: No flowsheet data found.    Mammogram Screening - Alternate mammogram schedule due to breast cancer history  Pertinent mammograms are reviewed under the imaging tab.    History of abnormal Pap smear: Last 3 Pap and HPV Results:       Reviewed and updated as needed this visit by clinical  "staff  Tobacco  Allergies  Meds             Reviewed and updated as needed this visit by Provider               Past Medical History:   Diagnosis Date     History of anesthesia complications     Slow to wake up     HTN (hypertension)      Nonsmoker      Thyroid nodule       Past Surgical History:   Procedure Laterality Date     ABDOMEN SURGERY        SECTION       INSERT INTRACORONARY STENT       [place] INSJ PRPH CTR VAD W/SUBQ PORT AGE 5 YR/> Left 10/8/2020    Procedure: Port Placement;  Surgeon: Kimberly Will MD;  Location: [facility];  Service: General     MO MASTECTOMY, SIMPLE, COMPLETE Bilateral 3/24/2021    Procedure: Bilateral Mastectomies; Right [place] Lymph Node Biopsy; Port Removal;  Surgeon: Kimberly Will MD;  Location: [facility];  Service: General     US BREAST CLIP PLACEMENT RIGHT Right 10/8/2020     US SENTINEL NODE INJECTION  3/24/2021     OB History   No obstetric history on file.       Review of Systems  Unremarkable as listed above and below     OBJECTIVE:   /76 (BP Location: Right arm, Patient Position: Sitting, Cuff Size: Adult Regular)   Pulse 72   Temp 98.1  F (36.7  C) (Oral)   Ht 1.619 m (5' 3.75\")   Wt 58.7 kg (129 lb 8 oz)   LMP 2020 (Approximate)   SpO2 98%   Breastfeeding No   BMI 22.40 kg/m    Physical Exam  GENERAL: healthy, alert and no distress  EYES: Eyes grossly normal to inspection, PERRL and conjunctivae and sclerae normal  HENT: ear canals and TM's normal, nose and mouth without ulcers or lesions  NECK: no adenopathy, lipoma posterior neck   RESP: lungs clear to auscultation - no rales, rhonchi or wheezes  BREAST: s/p mastectomy   CV: regular rate and rhythm, normal S1 S2, no murmur, click or rub, no peripheral edema and peripheral pulses strong  ABDOMEN: soft, nontender, umbilical hernia noted no hepatosplenomegaly, no masses and bowel sounds normal   (female): normal female external genitalia, normal urethral meatus, " vaginal mucosa pink, moist, well rugated, and normal cervix/adnexa/uterus without masses or discharge, Pap obtained  MS: no gross musculoskeletal defects noted, no edema  SKIN: no suspicious lesions or rashes  NEURO: Normal strength and tone, mentation intact and speech normal  PSYCH: mentation appears normal, affect normal/bright  LYMPH: no cervical, supraclavicular, axillary, or inguinal adenopathy    Recent Results (from the past 720 hour(s))   Comprehensive metabolic panel    Collection Time: 10/15/21  8:58 AM   Result Value Ref Range    Sodium 141 136 - 145 mmol/L    Potassium 3.5 3.5 - 5.0 mmol/L    Chloride 100 98 - 107 mmol/L    Carbon Dioxide (CO2) 29 22 - 31 mmol/L    Anion Gap 12 5 - 18 mmol/L    Urea Nitrogen 13 8 - 22 mg/dL    Creatinine 0.72 0.60 - 1.10 mg/dL    Calcium 10.6 (H) 8.5 - 10.5 mg/dL    Glucose 100 70 - 125 mg/dL    Alkaline Phosphatase 87 45 - 120 U/L    AST 40 0 - 40 U/L    ALT 54 (H) 0 - 45 U/L    Protein Total 8.2 (H) 6.0 - 8.0 g/dL    Albumin 4.2 3.5 - 5.0 g/dL    Bilirubin Total 0.4 0.0 - 1.0 mg/dL    GFR Estimate >90 >60 mL/min/1.73m2   TSH with free T4 reflex    Collection Time: 10/15/21  8:58 AM   Result Value Ref Range    TSH 5.44 (H) 0.30 - 5.00 uIU/mL   Lipid Profile (Chol, Trig, HDL, LDL calc)    Collection Time: 10/15/21  8:58 AM   Result Value Ref Range    Cholesterol 311 (H) <=199 mg/dL    Triglycerides 1,937 (H) <=149 mg/dL    Direct Measure HDL      LDL Cholesterol Calculated     T4 free    Collection Time: 10/15/21  8:58 AM   Result Value Ref Range    Free T4 0.89 0.70 - 1.80 ng/dL       ASSESSMENT/PLAN:     Problem List Items Addressed This Visit     None      Visit Diagnoses     Screening for cervical cancer    -  Primary    Relevant Orders    Pap Screen with HPV - recommended age 30 - 65 years    Hypertriglyceridemia        Relevant Orders    Lipid Profile (Chol, Trig, HDL, LDL calc)    Essential hypertension        Relevant Medications     "lisinopril-hydrochlorothiazide (ZESTORETIC) 10-12.5 MG tablet    Lipoma of skin and subcutaneous tissue        Relevant Orders    Adult General Surg Referral    Elevated blood sugar        Encounter for annual physical exam            Patient did have elevated triglycerides she reports that she had eaten poorly prior to her labs being drawn she is provided with education on hypertriglyceridemia lowering triglycerides through dietary changes with recommendation to repeat fasting lipids in 4 to 8 weeks.  Patient has been advised of split billing requirements and indicates understanding:   COUNSELING:  Reviewed preventive health counseling, as reflected in patient instructions       Healthy diet/nutrition       Vision screening    Estimated body mass index is 22.4 kg/m  as calculated from the following:    Height as of this encounter: 1.619 m (5' 3.75\").    Weight as of this encounter: 58.7 kg (129 lb 8 oz).        She reports that she has never smoked. She has never used smokeless tobacco.      Counseling Resources:  ATP IV Guidelines  Pooled Cohorts Equation Calculator  Breast Cancer Risk Calculator  BRCA-Related Cancer Risk Assessment: FHS-7 Tool  FRAX Risk Assessment  ICSI Preventive Guidelines  Dietary Guidelines for Americans, 2010  USDA's MyPlate  ASA Prophylaxis  Lung CA Screening    Cindy Michael NP  [facility]  "

## 2021-11-10 NOTE — PATIENT INSTRUCTIONS
"  Patient Education     Triglycerides  Does this test have other names?  Lipid panel, fasting lipoprotein panel  What is this test?  This test measures the amount of triglycerides in your blood.  Triglycerides are one of several types of fats in your blood. Other kinds are LDL (\"bad\") cholesterol and HDL (\"good\") cholesterol.   Knowing your triglyceride level is important, especially if you have diabetes, are overweight or a smoker, or are mostly inactive. High triglyceride levels may put you at greater risk for a heart attack or stroke.     This test is part of a group of cholesterol and blood fat tests called a fasting lipoprotein panel, or lipid panel. This panel is recommended for all adults at least once every 5 years, or as recommended by your healthcare provider.   Knowing your triglyceride level helps your healthcare provider suggest healthy changes to your diet or lifestyle. If you have triglycerides that are high to very high, your provider is more likely to prescribe medicines to lower your triglycerides or your LDL cholesterol.   Why do I need this test?  You may need this test as part of a routine checkup. You may also need this test if you're overweight, drink too much alcohol, rarely exercise, or have other conditions like high blood pressure or diabetes.   If you are on cholesterol-lowering medicines, you may have this test to see how well your treatment is working.   What other tests might I have along with this test?  Your healthcare provider will order screening tests for LDL, HDL, and total cholesterol.  What do my test results mean?  Test results may vary depending on your age, gender, health history, the method used for the test, and other things. Your test results may not mean you have a problem. Ask your healthcare provider what your test results mean for you.    Results are given in milligrams per deciliter (mg/dL). Normal levels of triglycerides are less than 150 mg/dL.   Here are how " higher numbers are classified:    150 to 199 mg/dL: Borderline high    200 to 499 mg/dL: High    500 mg/dL and above: Very high  If you have a high triglyceride level, you have a greater risk for heart attack and stroke.   A triglyceride level above 150 mg/dL also means that you may have an increased risk for metabolic syndrome. This is a cluster of symptoms including high blood pressure, high blood sugar, and high body fat around the waist. These symptoms have been linked to increased risk for diabetes, heart disease, and stroke.   High triglycerides levels can also be caused by certain diseases or inherited conditions.  If your triglyceride level is above 200 mg/dL, your healthcare provider may recommend that you:     Lose weight    Limit high-fat foods containing saturated fats. These are animal fats found in meat, butter, and whole milk.    Limit trans fats, which are found in many processed foods like chips and store-bought cookies    Cut back on drinks with added sugars, such as soda    Limit your alcohol intake    Stop smoking    Control your blood pressure    Exercise for at least 30 minutes a day, 5 days a week    Limit the calories from fat in your diet to 25% to 35% of your total intake  If your triglycerides are extremely high--above 500 mg/dL--you may have an added risk for problems with your pancreas. You will likely need medicine to lower your levels along with recommended changes in diet and lifestyle.   How is this test done?  The test is done with a blood sample. A needle is used to draw blood from a vein in your arm or hand.    Does this test pose any risks?  Having a blood test with a needle carries some risks. These include bleeding, infection, bruising, and feeling lightheaded. When the needle pricks your arm or hand, you may feel a slight sting or pain. Afterward, the site may be sore.    What might affect my test results?  Not fasting for the required length of time before the test can affect  your results. Certain medicines can affect your results, as can drinking alcohol.   How do I prepare for the test?  If you have this test as part of a cholesterol screening, you will need to not eat or drink anything but water for 9 to 12 hours before the test. Tell your healthcare provider about all medicines, herbs, vitamins, and supplements you are taking. This includes medicines that don't need a prescription and any illegal drugs you may use.   [employer] last reviewed this educational content on 8/1/2020 2000-2021 [employer]. All rights reserved. This information is not intended as a substitute for professional medical care. Always follow your healthcare professional's instructions.

## 2021-11-11 LAB
SARS-COV-2 AB SERPL IA-ACNC: <0.4 U/ML
SARS-COV-2 AB SERPL QL IA: NEGATIVE

## 2021-11-11 ASSESSMENT — ANXIETY QUESTIONNAIRES: GAD7 TOTAL SCORE: 0

## 2021-11-11 ASSESSMENT — PATIENT HEALTH QUESTIONNAIRE - PHQ9: SUM OF ALL RESPONSES TO PHQ QUESTIONS 1-9: 1

## 2021-11-15 LAB
HUMAN PAPILLOMA VIRUS 16 DNA: NEGATIVE
HUMAN PAPILLOMA VIRUS 18 DNA: NEGATIVE
HUMAN PAPILLOMA VIRUS FINAL DIAGNOSIS: NORMAL
HUMAN PAPILLOMA VIRUS OTHER HR: NEGATIVE

## 2021-11-19 LAB
BKR LAB AP GYN ADEQUACY: NORMAL
BKR LAB AP GYN INTERPRETATION: NORMAL
BKR LAB AP HPV REFLEX: NORMAL
BKR LAB AP LMP: NORMAL
BKR LAB AP PREVIOUS ABNORMAL: NORMAL
PATH REPORT.COMMENTS IMP SPEC: NORMAL
PATH REPORT.COMMENTS IMP SPEC: NORMAL
PATH REPORT.RELEVANT HX SPEC: NORMAL

## 2021-11-22 PROBLEM — Z12.4 SCREENING FOR CERVICAL CANCER: Status: ACTIVE | Noted: 2021-11-22

## 2021-12-01 ENCOUNTER — OFFICE VISIT (OUTPATIENT)
Dept: SURGERY | Facility: CLINIC | Age: 46
End: 2021-12-01
Attending: NURSE PRACTITIONER
Payer: COMMERCIAL

## 2021-12-01 VITALS — BODY MASS INDEX: 22.49 KG/M2 | WEIGHT: 130 LBS | SYSTOLIC BLOOD PRESSURE: 116 MMHG | DIASTOLIC BLOOD PRESSURE: 68 MMHG

## 2021-12-01 DIAGNOSIS — R22.1 NECK MASS: Primary | ICD-10-CM

## 2021-12-01 DIAGNOSIS — D17.30 LIPOMA OF SKIN AND SUBCUTANEOUS TISSUE: ICD-10-CM

## 2021-12-01 PROBLEM — T45.1X5A CHEMOTHERAPY-INDUCED NEUTROPENIA (H): Status: RESOLVED | Noted: 2020-10-05 | Resolved: 2021-12-01

## 2021-12-01 PROBLEM — D70.1 CHEMOTHERAPY-INDUCED NEUTROPENIA (H): Status: RESOLVED | Noted: 2020-10-05 | Resolved: 2021-12-01

## 2021-12-01 PROCEDURE — 99213 OFFICE O/P EST LOW 20 MIN: CPT | Performed by: SURGERY

## 2021-12-01 NOTE — PROGRESS NOTES
GENERAL SURGICAL CONSULTATION    I was requested by Cindy Michael to consult on this pt to evaluate them for a neck mass.    HPI:  This is a 46 year old female here today with mass on the back Left side of the neck.  It is soft to palpation.  She had a Lipoma removed from this area once before ().       Allergies:Sulfa (sulfonamide antibiotics) [sulfa drugs] and Latex    Past Medical History:   Diagnosis Date     History of anesthesia complications     Slow to wake up     HTN (hypertension)      Nonsmoker      Thyroid nodule        Past Surgical History:   Procedure Laterality Date     ABDOMEN SURGERY        SECTION       INSERT INTRACORONARY STENT       [place] INSJ PRPH CTR VAD W/SUBQ PORT AGE 5 YR/> Left 10/8/2020    Procedure: Port Placement;  Surgeon: Kimberly Will MD;  Location: [facility];  Service: General     [place] MASTECTOMY, SIMPLE, COMPLETE Bilateral 3/24/2021    Procedure: Bilateral Mastectomies; Right [place] Lymph Node Biopsy; Port Removal;  Surgeon: Kimberly Will MD;  Location: [facility];  Service: General     US BREAST CLIP PLACEMENT RIGHT Right 10/8/2020      SENTINEL NODE INJECTION  3/24/2021       CURRENT MEDS:  Current Outpatient Medications   Medication Sig Dispense Refill     lisinopril-hydrochlorothiazide (ZESTORETIC) 10-12.5 MG tablet [LISINOPRIL-HYDROCHLOROTHIAZIDE (PRINZIDE,ZESTORETIC) 10-12.5 MG PER TABLET] TAKE 1 TABLET BY MOUTH EVERY DAY 90 tablet 3     tamoxifen (NOLVADEX) 20 MG tablet Take 1 tablet (20 mg) by mouth daily 90 tablet 3       Family History   Problem Relation Age of Onset     Hypertension Mother      Hypertension Father      Cancer Maternal Grandmother 70.00        unknown-abdominal?     Cancer Maternal Grandfather 70.00        liver?     Family history is not pertinent to this patients Chief Complaint.     reports that she has never smoked. She has never used smokeless tobacco. She reports that she does not drink alcohol and does not  use drugs.    Review of Systems -   10 point Review of systems is negative except for; as mentioned above in HPI and PMHx    There were no vitals taken for this visit.  There is no height or weight on file to calculate BMI.    EXAM:  GENERAL: Well developed female  HEENT: 3-5 cm soft tissue mass in the Sub Cut space on the back of the neck.  It is tender to palpation.  EOMI, Anicteric Sclera, Moist Mucous Membranes,  In Mouth the pt does not have redness or bleeding gums  CARDIOVASCULAR: RRR w/out murmur   CHEST/LUNG: Clear to Auscultation  ABDOMEN:  Non tender to palpation, +BS  MUSCULOSKELETAL:  No deformities with good range of motion in all extremities  NEURO: She is ambulatory with good strength in both legs.  HEME/LYMPH: No Cervical Adenopathy or tenderness.     IMAGES:  I evaluated these images myself and None    Assessment/Plan:  Lipoma on the back of the neck Left Side.  It is tender to palpation but soft without signs of infection.    Excisional Biopsy in the surgical center.      Elia Parson MD  [facility]  [phone]

## 2021-12-01 NOTE — LETTER
2021         RE: Jes Mistry  [id number] [facility] [address]        Dear Colleague,    Thank you for referring your patient, Jes Mistry, to the [facility] [place]. Please see a copy of my visit note below.    GENERAL SURGICAL CONSULTATION    I was requested by Cindy Michael to consult on this pt to evaluate them for a neck mass.    HPI:  This is a 46 year old female here today with mass on the back Left side of the neck.  It is soft to palpation.  She had a Lipoma removed from this area once before ().       Allergies:Sulfa (sulfonamide antibiotics) [sulfa drugs] and Latex    Past Medical History:   Diagnosis Date     History of anesthesia complications     Slow to wake up     HTN (hypertension)      Nonsmoker      Thyroid nodule        Past Surgical History:   Procedure Laterality Date     ABDOMEN SURGERY        SECTION       INSERT INTRACORONARY STENT       [place] INSJ PRPH CTR VAD W/SUBQ PORT AGE 5 YR/> Left 10/8/2020    Procedure: Port Placement;  Surgeon: Kimberly Will MD;  Location: [facility];  Service: General     [place] MASTECTOMY, SIMPLE, COMPLETE Bilateral 3/24/2021    Procedure: Bilateral Mastectomies; Right [place] Lymph Node Biopsy; Port Removal;  Surgeon: Kimberly Will MD;  Location: [facility];  Service: General     US BREAST CLIP PLACEMENT RIGHT Right 10/8/2020      SENTINEL NODE INJECTION  3/24/2021       CURRENT MEDS:  Current Outpatient Medications   Medication Sig Dispense Refill     lisinopril-hydrochlorothiazide (ZESTORETIC) 10-12.5 MG tablet [LISINOPRIL-HYDROCHLOROTHIAZIDE (PRINZIDE,ZESTORETIC) 10-12.5 MG PER TABLET] TAKE 1 TABLET BY MOUTH EVERY DAY 90 tablet 3     tamoxifen (NOLVADEX) 20 MG tablet Take 1 tablet (20 mg) by mouth daily 90 tablet 3       Family History   Problem Relation Age of Onset     Hypertension Mother      Hypertension Father      Cancer Maternal Grandmother  70.00        unknown-abdominal?     Cancer Maternal Grandfather 70.00        liver?     Family history is not pertinent to this patients Chief Complaint.     reports that she has never smoked. She has never used smokeless tobacco. She reports that she does not drink alcohol and does not use drugs.    Review of Systems -   10 point Review of systems is negative except for; as mentioned above in HPI and PMHx    There were no vitals taken for this visit.  There is no height or weight on file to calculate BMI.    EXAM:  GENERAL: Well developed female  HEENT: 3-5 cm soft tissue mass in the Sub Cut space on the back of the neck.  It is tender to palpation.  EOMI, Anicteric Sclera, Moist Mucous Membranes,  In Mouth the pt does not have redness or bleeding gums  CARDIOVASCULAR: RRR w/out murmur   CHEST/LUNG: Clear to Auscultation  ABDOMEN:  Non tender to palpation, +BS  MUSCULOSKELETAL:  No deformities with good range of motion in all extremities  NEURO: She is ambulatory with good strength in both legs.  HEME/LYMPH: No Cervical Adenopathy or tenderness.     IMAGES:  I evaluated these images myself and None    Assessment/Plan:  Lipoma on the back of the neck Left Side.  It is tender to palpation but soft without signs of infection.    Excisional Biopsy in the surgical center.      Elia Parson MD  [facility]  [phone]      Again, thank you for allowing me to participate in the care of your patient.        Sincerely,        Elia Parson MD

## 2021-12-02 NOTE — PROGRESS NOTES
[facility] Hematology and Oncology Progress Note    Patient: Jes Mistry  MRN: [id number]  Date of Service: Oct 15, 2021        Assessment and Plan:    Cancer Staging  Malignant neoplasm of lower-inner quadrant of right breast of female, estrogen receptor negative (H)  Staging form: Breast, AJCC 8th Edition  - Clinical: Stage IIB (cT2, cN0, cM0, G3, ER-, MO-, HER2-) - Signed by Kaela Plunkett CNP on 10/9/2020     1.  Invasive ductal carcinoma: Overall tolerating tamoxifen well.  We will continue for at least 5 years.  She will let us know in the interim if she develops any symptoms.  We spent some time today reviewing her pathology pre and post chemotherapy.  We also reviewed the rationale for clinical decision making regarding her treatment.  Questions were answered.    I spent 30 minutes in the care of this patient today, which included time necessary for preparation for the visit, face to face time with the patient, communication of recommendations to the care team, and documentation time.    ECOG Performance  0    Diagnosis:    1.  Carcinoma of the right breast: High-grade.  6 o'clock position.  Diagnosed September 2020. Estrogen receptor positive in 1%.  MO and HER-2 negative.  Measured 24 x 13 x 19 mm on ultrasound. Ultrasound of the axilla was negative.  2 sentinel nodes are negative.    Treatment:    Neoadjuvant chemotherapy with Adriamycin and Cytoxan started October 9, 2020.  Weekly Taxol started December 3, 2020.     Right-sided mastectomy performed March 21, 2021: Final pathology shows residual invasive ductal carcinoma, grade 2, 6 x 6 x 4 mm.  Negative margins.  ER positive at 30%.  HER-2/emily negative by FISH.    Tamoxifen initiated April 2021.    Interim History:    Alice is here today for a follow-up visit.  Overall she is doing okay.  Having some hot flashes on the tamoxifen.  These are generally stable.  No acute complaints today.    Review of Systems:    As above in the  history.     Review of Systems otherwise Negative for:  General: chills, fever or night sweats  Psychological: anxiety or depression  Ophthalmic: blurry vision, double vision or loss of vision, vision change  ENT: epistaxis, oral lesions, hearing changes  Hematological and Lymphatic: bleeding, bruising, jaundice, swollen lymph nodes  Endocrine: unexpected weight changes  Respiratory: cough, hemoptysis, orthopnea or shortness of breath/ACHARYA  Cardiovascular: chest pain, edema, palpitations or PND  Gastrointestinal: abdominal pain, blood in stools, change in bowel habits, constipation, diarrhea or nausea/vomiting  Genito-Urinary: change in urinary stream, incontinence, frequency/urgency  Musculoskeletal: joint pain, stiffness, swelling, muscle pain  Neurological: dizziness, headaches, numbness/tingling  Dermatological: lumps and rash    Past History:    Past Medical History:   Diagnosis Date     History of anesthesia complications     Slow to wake up     HTN (hypertension)      Nonsmoker      Thyroid nodule 2011     Physical Exam:    /64   Pulse 81   Temp 98.2  F (36.8  C)   Resp 18   Wt 59.4 kg (131 lb)   SpO2 100%   BMI 23.21 kg/m      General: patient appears stated age of 46 year old. Nontoxic and in no distress.   HEENT: Head: atraumatic, normocephalic. Sclerae anicteric.  Chest:  Normal respiratory effort  Cardiac:  No edema.   Abdomen: abdomen is non-distended  Extremities: normal tone and muscle bulk.  Skin: no lesions or rash on visible skin. Warm and dry.   CNS: alert and oriented. Grossly non-focal.   Psychiatric: normal mood and affect.     Lab Results:    No results found for this or any previous visit (from the past 168 hour(s)).     Imaging:    No results found.      Signed by: Elia Olsen MD

## 2021-12-29 DIAGNOSIS — Z11.59 ENCOUNTER FOR SCREENING FOR OTHER VIRAL DISEASES: ICD-10-CM

## 2022-01-13 ENCOUNTER — OFFICE VISIT (OUTPATIENT)
Dept: INTERNAL MEDICINE | Facility: CLINIC | Age: 47
End: 2022-01-13
Payer: COMMERCIAL

## 2022-01-13 VITALS
WEIGHT: 132 LBS | BODY MASS INDEX: 22.53 KG/M2 | RESPIRATION RATE: 16 BRPM | OXYGEN SATURATION: 100 % | SYSTOLIC BLOOD PRESSURE: 114 MMHG | HEART RATE: 73 BPM | HEIGHT: 64 IN | TEMPERATURE: 97.8 F | DIASTOLIC BLOOD PRESSURE: 62 MMHG

## 2022-01-13 DIAGNOSIS — I10 ESSENTIAL HYPERTENSION: ICD-10-CM

## 2022-01-13 DIAGNOSIS — E78.1 HYPERTRIGLYCERIDEMIA: ICD-10-CM

## 2022-01-13 DIAGNOSIS — C50.311 MALIGNANT NEOPLASM OF LOWER-INNER QUADRANT OF RIGHT BREAST OF FEMALE, ESTROGEN RECEPTOR NEGATIVE (H): ICD-10-CM

## 2022-01-13 DIAGNOSIS — Z01.818 PREOPERATIVE EXAMINATION: Primary | ICD-10-CM

## 2022-01-13 DIAGNOSIS — Z17.1 MALIGNANT NEOPLASM OF LOWER-INNER QUADRANT OF RIGHT BREAST OF FEMALE, ESTROGEN RECEPTOR NEGATIVE (H): ICD-10-CM

## 2022-01-13 DIAGNOSIS — D17.30 LIPOMA OF SKIN AND SUBCUTANEOUS TISSUE: ICD-10-CM

## 2022-01-13 LAB
ANION GAP SERPL CALCULATED.3IONS-SCNC: 10 MMOL/L (ref 5–18)
BASOPHILS # BLD AUTO: 0 10E3/UL (ref 0–0.2)
BASOPHILS NFR BLD AUTO: 1 %
BUN SERPL-MCNC: 14 MG/DL (ref 8–22)
CALCIUM SERPL-MCNC: 10.1 MG/DL (ref 8.5–10.5)
CHLORIDE BLD-SCNC: 101 MMOL/L (ref 98–107)
CO2 SERPL-SCNC: 28 MMOL/L (ref 22–31)
CREAT SERPL-MCNC: 0.75 MG/DL (ref 0.6–1.1)
EOSINOPHIL # BLD AUTO: 0.1 10E3/UL (ref 0–0.7)
EOSINOPHIL NFR BLD AUTO: 3 %
ERYTHROCYTE [DISTWIDTH] IN BLOOD BY AUTOMATED COUNT: 13 % (ref 10–15)
GFR SERPL CREATININE-BSD FRML MDRD: >90 ML/MIN/1.73M2
GLUCOSE BLD-MCNC: 91 MG/DL (ref 70–125)
HCT VFR BLD AUTO: 42.1 % (ref 35–47)
HGB BLD-MCNC: 14.4 G/DL (ref 11.7–15.7)
IMM GRANULOCYTES # BLD: 0 10E3/UL
IMM GRANULOCYTES NFR BLD: 0 %
LYMPHOCYTES # BLD AUTO: 1.5 10E3/UL (ref 0.8–5.3)
LYMPHOCYTES NFR BLD AUTO: 30 %
MCH RBC QN AUTO: 30.6 PG (ref 26.5–33)
MCHC RBC AUTO-ENTMCNC: 34.2 G/DL (ref 31.5–36.5)
MCV RBC AUTO: 89 FL (ref 78–100)
MONOCYTES # BLD AUTO: 0.4 10E3/UL (ref 0–1.3)
MONOCYTES NFR BLD AUTO: 8 %
NEUTROPHILS # BLD AUTO: 3 10E3/UL (ref 1.6–8.3)
NEUTROPHILS NFR BLD AUTO: 58 %
PLATELET # BLD AUTO: 215 10E3/UL (ref 150–450)
POTASSIUM BLD-SCNC: 3.8 MMOL/L (ref 3.5–5)
RBC # BLD AUTO: 4.71 10E6/UL (ref 3.8–5.2)
SODIUM SERPL-SCNC: 139 MMOL/L (ref 136–145)
WBC # BLD AUTO: 5.1 10E3/UL (ref 4–11)

## 2022-01-13 PROCEDURE — 36415 COLL VENOUS BLD VENIPUNCTURE: CPT | Performed by: NURSE PRACTITIONER

## 2022-01-13 PROCEDURE — 85025 COMPLETE CBC W/AUTO DIFF WBC: CPT | Performed by: NURSE PRACTITIONER

## 2022-01-13 PROCEDURE — 80048 BASIC METABOLIC PNL TOTAL CA: CPT | Performed by: NURSE PRACTITIONER

## 2022-01-13 PROCEDURE — 99214 OFFICE O/P EST MOD 30 MIN: CPT | Performed by: NURSE PRACTITIONER

## 2022-01-13 ASSESSMENT — MIFFLIN-ST. JEOR: SCORE: 1219.78

## 2022-01-13 NOTE — PROGRESS NOTES
[address] [address]  Phone: [phone]  Fax: [phone]  Primary Provider: Sj Michael  Pre-op Performing Provider: SJ MICHAEL      PREOPERATIVE EVALUATION:  Today's date: 1/13/2022    Jes Mistry is a 46 year old female who presents for a preoperative evaluation.    Surgical Information:  Surgery/Procedure: Left EXCISION, MASS, NECK  Surgery Location: [facility]  Surgeon: Elia Parson MD  Surgery Date: 1/27/22  Time of Surgery: 12:00  Where patient plans to recover: At home with family  Fax number for surgical facility: Note does not need to be faxed, will be available electronically in Epic.    Type of Anesthesia Anticipated: Choice    Assessment & Plan     The proposed surgical procedure is considered LOW risk.    Problem List Items Addressed This Visit        Other    Malignant neoplasm of lower-inner quadrant of right breast of female, estrogen receptor negative (H)      Other Visit Diagnoses     Preoperative examination    -  Primary    Relevant Orders    Basic metabolic panel    CBC with platelets and differential (Completed)    Lipoma of skin and subcutaneous tissue        Essential hypertension        Hypertriglyceridemia                   Risks and Recommendations:  The patient has the following additional risks and recommendations for perioperative complications:   - No identified additional risk factors other than previously addressed    Medication Instructions:   - ACE/ARB: HOLD due to exceptional risk of hypotension during surgery.     RECOMMENDATION:  APPROVAL GIVEN to proceed with proposed procedure, without further diagnostic evaluation.    Review of external notes as documented above                   Subjective     HPI related to upcoming procedure: Patient has been referred to general surgery due to mass on the left side of her posterior neck palpable did have a previous lipoma  removed from this area in 2012.  Is tender to the touch and will be undergoing excision.    Preop Questions 1/13/2022   1. Have you ever had a heart attack or stroke? No   2. Have you ever had surgery on your heart or blood vessels, such as a stent placement, a coronary artery bypass, or surgery on an artery in your head, neck, heart, or legs? No   3. Do you have chest pain with activity? No   4. Do you have a history of  heart failure? No   5. Do you currently have a cold, bronchitis or symptoms of other infection? No   6. Do you have a cough, shortness of breath, or wheezing? No   7. Do you or anyone in your family have previous history of blood clots? YES -    8. Do you or does anyone in your family have a serious bleeding problem such as prolonged bleeding following surgeries or cuts? No   9. Have you ever had problems with anemia or been told to take iron pills? YES -    10. Have you had any abnormal blood loss such as black, tarry or bloody stools, or abnormal vaginal bleeding? No   11. Have you ever had a blood transfusion? No   12. Are you willing to have a blood transfusion if it is medically needed before, during, or after your surgery? Yes   13. Have you or any of your relatives ever had problems with anesthesia? No   14. Do you have sleep apnea, excessive snoring or daytime drowsiness? No   15. Do you have any artifical heart valves or other implanted medical devices like a pacemaker, defibrillator, or continuous glucose monitor? No   16. Do you have artificial joints? No   17. Are you allergic to latex? YES:    18. Is there any chance that you may be pregnant? No       Health Care Directive:  Patient does not have a Health Care Directive or Living Will: Discussed advance care planning with patient; however, patient declined at this time.    Preoperative Review of :   reviewed - controlled substances reflected in medication list.      Status of Chronic Conditions:  See problem list for active  medical problems.  Problems all longstanding and stable, except as noted/documented.  See ROS for pertinent symptoms related to these conditions.      Review of Systems  Unremarkable other than listed above and below       Patient Active Problem List    Diagnosis Date Noted     Neck mass 2021     Priority: Medium     Added automatically from request for surgery [id number]       Screening for cervical cancer 2021     Priority: Medium      negative per pt report  11/10. NIL pap, neg HPV. On Tamoxifen. Cotest in 3 years       Malignant neoplasm of lower-inner quadrant of right breast of female, estrogen receptor negative (H) 10/06/2020     Priority: Medium     Added automatically from request for surgery [id number]         HTN (hypertension)      Priority: Medium     Nonsmoker      Priority: Medium      Past Medical History:   Diagnosis Date     History of anesthesia complications     Slow to wake up     HTN (hypertension)      Nonsmoker      Thyroid nodule      Past Surgical History:   Procedure Laterality Date     ABDOMEN SURGERY        SECTION       INSERT INTRACORONARY STENT       AL INSJ PRPH CTR VAD W/SUBQ PORT AGE 5 YR/> Left 10/8/2020    Procedure: Port Placement;  Surgeon: Kimberly Will MD;  Location: [facility];  Service: General     AL MASTECTOMY, SIMPLE, COMPLETE Bilateral 3/24/2021    Procedure: Bilateral Mastectomies; Right [place] Lymph Node Biopsy; Port Removal;  Surgeon: Kimberly Will MD;  Location: [facility];  Service: General     US BREAST CLIP PLACEMENT RIGHT Right 10/8/2020      SENTINEL NODE INJECTION  3/24/2021     Current Outpatient Medications   Medication Sig Dispense Refill     lisinopril-hydrochlorothiazide (ZESTORETIC) 10-12.5 MG tablet [LISINOPRIL-HYDROCHLOROTHIAZIDE (PRINZIDE,ZESTORETIC) 10-12.5 MG PER TABLET] TAKE 1 TABLET BY MOUTH EVERY DAY 90 tablet 3     tamoxifen (NOLVADEX) 20 MG tablet Take 1 tablet (20 mg) by mouth daily 90 tablet 3        Allergies   Allergen Reactions     Sulfa (Sulfonamide Antibiotics) [Sulfa Drugs] Unknown     Latex Itching        Social History     Tobacco Use     Smoking status: Never Smoker     Smokeless tobacco: Never Used   Substance Use Topics     Alcohol use: No     Family History   Problem Relation Age of Onset     Hypertension Mother      Hypertension Father      Cancer Maternal Grandmother 70.00        unknown-abdominal?     Cancer Maternal Grandfather 70.00        liver?     History   Drug Use No         Objective     There were no vitals taken for this visit.    Physical Exam  GENERAL APPEARANCE: healthy, alert and no distress  HENT: ear canals and TM's normal and nose and mouth without ulcers or lesions  RESP: lungs clear to auscultation - no rales, rhonchi or wheezes  CV: regular rate and rhythm, normal S1 S2  NEURO: Normal strength and tone, sensory exam grossly normal, mentation intact and speech normal  PSYCH: mentation appears normal and affect normal/bright    Recent Labs   Lab Test 11/10/21  0937 10/15/21  0858 06/16/21  0917 03/03/21  0954   HGB  --   --  14.8 13.3   PLT  --   --  232 300   NA  --  141 141  --    POTASSIUM  --  3.5 3.9  --    CR  --  0.72 0.74  --    A1C 5.3  --   --   --         Diagnostics:  Recent Results (from the past 168 hour(s))   Basic metabolic panel    Collection Time: 01/13/22  9:44 AM   Result Value Ref Range    Sodium 139 136 - 145 mmol/L    Potassium 3.8 3.5 - 5.0 mmol/L    Chloride 101 98 - 107 mmol/L    Carbon Dioxide (CO2) 28 22 - 31 mmol/L    Anion Gap 10 5 - 18 mmol/L    Urea Nitrogen 14 8 - 22 mg/dL    Creatinine 0.75 0.60 - 1.10 mg/dL    Calcium 10.1 8.5 - 10.5 mg/dL    Glucose 91 70 - 125 mg/dL    GFR Estimate >90 >60 mL/min/1.73m2   CBC with platelets and differential    Collection Time: 01/13/22  9:44 AM   Result Value Ref Range    WBC Count 5.1 4.0 - 11.0 10e3/uL    RBC Count 4.71 3.80 - 5.20 10e6/uL    Hemoglobin 14.4 11.7 - 15.7 g/dL    Hematocrit 42.1 35.0 -  47.0 %    MCV 89 78 - 100 fL    MCH 30.6 26.5 - 33.0 pg    MCHC 34.2 31.5 - 36.5 g/dL    RDW 13.0 10.0 - 15.0 %    Platelet Count 215 150 - 450 10e3/uL    % Neutrophils 58 %    % Lymphocytes 30 %    % Monocytes 8 %    % Eosinophils 3 %    % Basophils 1 %    % Immature Granulocytes 0 %    Absolute Neutrophils 3.0 1.6 - 8.3 10e3/uL    Absolute Lymphocytes 1.5 0.8 - 5.3 10e3/uL    Absolute Monocytes 0.4 0.0 - 1.3 10e3/uL    Absolute Eosinophils 0.1 0.0 - 0.7 10e3/uL    Absolute Basophils 0.0 0.0 - 0.2 10e3/uL    Absolute Immature Granulocytes 0.0 <=0.4 10e3/uL          Revised Cardiac Risk Index (RCRI):  The patient has the following serious cardiovascular risks for perioperative complications:   - No serious cardiac risks = 0 points     RCRI Interpretation: 1 point: Class II (low risk - 0.9% complication rate)           Signed Electronically by: Cindy Michael NP  Copy of this evaluation report is provided to requesting physician.

## 2022-01-23 ENCOUNTER — LAB (OUTPATIENT)
Dept: FAMILY MEDICINE | Facility: CLINIC | Age: 47
End: 2022-01-23
Payer: COMMERCIAL

## 2022-01-23 DIAGNOSIS — Z11.59 ENCOUNTER FOR SCREENING FOR OTHER VIRAL DISEASES: ICD-10-CM

## 2022-01-23 PROCEDURE — U0005 INFEC AGEN DETEC AMPLI PROBE: HCPCS

## 2022-01-23 PROCEDURE — U0003 INFECTIOUS AGENT DETECTION BY NUCLEIC ACID (DNA OR RNA); SEVERE ACUTE RESPIRATORY SYNDROME CORONAVIRUS 2 (SARS-COV-2) (CORONAVIRUS DISEASE [COVID-19]), AMPLIFIED PROBE TECHNIQUE, MAKING USE OF HIGH THROUGHPUT TECHNOLOGIES AS DESCRIBED BY CMS-2020-01-R: HCPCS

## 2022-01-24 LAB — SARS-COV-2 RNA RESP QL NAA+PROBE: NEGATIVE

## 2022-01-24 ASSESSMENT — MIFFLIN-ST. JEOR: SCORE: 1192.56

## 2022-01-26 ENCOUNTER — ANESTHESIA EVENT (OUTPATIENT)
Dept: SURGERY | Facility: AMBULATORY SURGERY CENTER | Age: 47
End: 2022-01-26
Payer: COMMERCIAL

## 2022-01-27 ENCOUNTER — HOSPITAL ENCOUNTER (OUTPATIENT)
Facility: AMBULATORY SURGERY CENTER | Age: 47
End: 2022-01-27
Attending: SURGERY
Payer: COMMERCIAL

## 2022-01-27 ENCOUNTER — ANESTHESIA (OUTPATIENT)
Dept: SURGERY | Facility: AMBULATORY SURGERY CENTER | Age: 47
End: 2022-01-27
Payer: COMMERCIAL

## 2022-01-27 VITALS
DIASTOLIC BLOOD PRESSURE: 78 MMHG | BODY MASS INDEX: 21.51 KG/M2 | HEART RATE: 69 BPM | OXYGEN SATURATION: 99 % | SYSTOLIC BLOOD PRESSURE: 123 MMHG | HEIGHT: 64 IN | TEMPERATURE: 96.9 F | WEIGHT: 126 LBS | RESPIRATION RATE: 16 BRPM

## 2022-01-27 DIAGNOSIS — R22.1 NECK MASS: ICD-10-CM

## 2022-01-27 PROCEDURE — 21552 EXC NECK LES SC 3 CM/>: CPT | Performed by: SURGERY

## 2022-01-27 RX ORDER — HYDROMORPHONE HCL IN WATER/PF 6 MG/30 ML
0.2 PATIENT CONTROLLED ANALGESIA SYRINGE INTRAVENOUS EVERY 5 MIN PRN
Status: DISCONTINUED | OUTPATIENT
Start: 2022-01-27 | End: 2022-01-28 | Stop reason: HOSPADM

## 2022-01-27 RX ORDER — FENTANYL CITRATE 50 UG/ML
25 INJECTION, SOLUTION INTRAMUSCULAR; INTRAVENOUS EVERY 5 MIN PRN
Status: DISCONTINUED | OUTPATIENT
Start: 2022-01-27 | End: 2022-01-28 | Stop reason: HOSPADM

## 2022-01-27 RX ORDER — CEFAZOLIN SODIUM 2 G/100ML
2 INJECTION, SOLUTION INTRAVENOUS
Status: COMPLETED | OUTPATIENT
Start: 2022-01-27 | End: 2022-01-27

## 2022-01-27 RX ORDER — NEOSTIGMINE METHYLSULFATE 1 MG/ML
VIAL (ML) INJECTION PRN
Status: DISCONTINUED | OUTPATIENT
Start: 2022-01-27 | End: 2022-01-27

## 2022-01-27 RX ORDER — HYDROCODONE BITARTRATE AND ACETAMINOPHEN 5; 325 MG/1; MG/1
1-2 TABLET ORAL EVERY 4 HOURS PRN
Qty: 10 TABLET | Refills: 0 | Status: SHIPPED | OUTPATIENT
Start: 2022-01-27 | End: 2022-02-21

## 2022-01-27 RX ORDER — LIDOCAINE HYDROCHLORIDE 20 MG/ML
INJECTION, SOLUTION INFILTRATION; PERINEURAL PRN
Status: DISCONTINUED | OUTPATIENT
Start: 2022-01-27 | End: 2022-01-27

## 2022-01-27 RX ORDER — OXYCODONE HYDROCHLORIDE 5 MG/1
5 TABLET ORAL EVERY 4 HOURS PRN
Status: DISCONTINUED | OUTPATIENT
Start: 2022-01-27 | End: 2022-01-28 | Stop reason: HOSPADM

## 2022-01-27 RX ORDER — FENTANYL CITRATE 50 UG/ML
INJECTION, SOLUTION INTRAMUSCULAR; INTRAVENOUS PRN
Status: DISCONTINUED | OUTPATIENT
Start: 2022-01-27 | End: 2022-01-27

## 2022-01-27 RX ORDER — ACETAMINOPHEN 325 MG/1
975 TABLET ORAL ONCE
Status: COMPLETED | OUTPATIENT
Start: 2022-01-27 | End: 2022-01-27

## 2022-01-27 RX ORDER — SODIUM CHLORIDE, SODIUM LACTATE, POTASSIUM CHLORIDE, CALCIUM CHLORIDE 600; 310; 30; 20 MG/100ML; MG/100ML; MG/100ML; MG/100ML
INJECTION, SOLUTION INTRAVENOUS CONTINUOUS
Status: DISCONTINUED | OUTPATIENT
Start: 2022-01-27 | End: 2022-01-28 | Stop reason: HOSPADM

## 2022-01-27 RX ORDER — ASCORBIC ACID 100 MG
TABLET,CHEWABLE ORAL
COMMUNITY

## 2022-01-27 RX ORDER — ONDANSETRON 2 MG/ML
4 INJECTION INTRAMUSCULAR; INTRAVENOUS EVERY 30 MIN PRN
Status: DISCONTINUED | OUTPATIENT
Start: 2022-01-27 | End: 2022-01-28 | Stop reason: HOSPADM

## 2022-01-27 RX ORDER — IBUPROFEN 600 MG/1
600 TABLET, FILM COATED ORAL EVERY 6 HOURS PRN
Qty: 30 TABLET | Refills: 0 | Status: SHIPPED | OUTPATIENT
Start: 2022-01-27 | End: 2022-11-09

## 2022-01-27 RX ORDER — PROPOFOL 10 MG/ML
INJECTION, EMULSION INTRAVENOUS CONTINUOUS PRN
Status: DISCONTINUED | OUTPATIENT
Start: 2022-01-27 | End: 2022-01-27

## 2022-01-27 RX ORDER — FENTANYL CITRATE 50 UG/ML
25 INJECTION, SOLUTION INTRAMUSCULAR; INTRAVENOUS
Status: DISCONTINUED | OUTPATIENT
Start: 2022-01-27 | End: 2022-01-28 | Stop reason: HOSPADM

## 2022-01-27 RX ORDER — LIDOCAINE 40 MG/G
CREAM TOPICAL
Status: DISCONTINUED | OUTPATIENT
Start: 2022-01-27 | End: 2022-01-28 | Stop reason: HOSPADM

## 2022-01-27 RX ORDER — CEFAZOLIN SODIUM 2 G/100ML
2 INJECTION, SOLUTION INTRAVENOUS SEE ADMIN INSTRUCTIONS
Status: DISCONTINUED | OUTPATIENT
Start: 2022-01-27 | End: 2022-01-28 | Stop reason: HOSPADM

## 2022-01-27 RX ORDER — DEXAMETHASONE SODIUM PHOSPHATE 4 MG/ML
INJECTION, SOLUTION INTRA-ARTICULAR; INTRALESIONAL; INTRAMUSCULAR; INTRAVENOUS; SOFT TISSUE PRN
Status: DISCONTINUED | OUTPATIENT
Start: 2022-01-27 | End: 2022-01-27

## 2022-01-27 RX ORDER — ONDANSETRON 2 MG/ML
INJECTION INTRAMUSCULAR; INTRAVENOUS PRN
Status: DISCONTINUED | OUTPATIENT
Start: 2022-01-27 | End: 2022-01-27

## 2022-01-27 RX ORDER — GLYCOPYRROLATE 0.2 MG/ML
INJECTION, SOLUTION INTRAMUSCULAR; INTRAVENOUS PRN
Status: DISCONTINUED | OUTPATIENT
Start: 2022-01-27 | End: 2022-01-27

## 2022-01-27 RX ORDER — PROPOFOL 10 MG/ML
INJECTION, EMULSION INTRAVENOUS PRN
Status: DISCONTINUED | OUTPATIENT
Start: 2022-01-27 | End: 2022-01-27

## 2022-01-27 RX ORDER — ONDANSETRON 4 MG/1
4 TABLET, ORALLY DISINTEGRATING ORAL EVERY 30 MIN PRN
Status: DISCONTINUED | OUTPATIENT
Start: 2022-01-27 | End: 2022-01-28 | Stop reason: HOSPADM

## 2022-01-27 RX ORDER — BUPIVACAINE HYDROCHLORIDE AND EPINEPHRINE 2.5; 5 MG/ML; UG/ML
INJECTION, SOLUTION INFILTRATION; PERINEURAL PRN
Status: DISCONTINUED | OUTPATIENT
Start: 2022-01-27 | End: 2022-01-27 | Stop reason: HOSPADM

## 2022-01-27 RX ADMIN — PROPOFOL 30 MG: 10 INJECTION, EMULSION INTRAVENOUS at 13:37

## 2022-01-27 RX ADMIN — Medication 3 MG: at 13:56

## 2022-01-27 RX ADMIN — SODIUM CHLORIDE, SODIUM LACTATE, POTASSIUM CHLORIDE, CALCIUM CHLORIDE: 600; 310; 30; 20 INJECTION, SOLUTION INTRAVENOUS at 13:14

## 2022-01-27 RX ADMIN — ONDANSETRON 4 MG: 2 INJECTION INTRAMUSCULAR; INTRAVENOUS at 13:55

## 2022-01-27 RX ADMIN — CEFAZOLIN SODIUM 2 G: 2 INJECTION, SOLUTION INTRAVENOUS at 13:30

## 2022-01-27 RX ADMIN — Medication 20 MG: at 13:25

## 2022-01-27 RX ADMIN — FENTANYL CITRATE 50 MCG: 50 INJECTION, SOLUTION INTRAMUSCULAR; INTRAVENOUS at 13:25

## 2022-01-27 RX ADMIN — PROPOFOL 20 MG: 10 INJECTION, EMULSION INTRAVENOUS at 13:52

## 2022-01-27 RX ADMIN — PROPOFOL 200 MG: 10 INJECTION, EMULSION INTRAVENOUS at 13:25

## 2022-01-27 RX ADMIN — LIDOCAINE HYDROCHLORIDE 60 MG: 20 INJECTION, SOLUTION INFILTRATION; PERINEURAL at 13:25

## 2022-01-27 RX ADMIN — DEXAMETHASONE SODIUM PHOSPHATE 10 MG: 4 INJECTION, SOLUTION INTRA-ARTICULAR; INTRALESIONAL; INTRAMUSCULAR; INTRAVENOUS; SOFT TISSUE at 13:25

## 2022-01-27 RX ADMIN — GLYCOPYRROLATE 0.4 MG: 0.2 INJECTION, SOLUTION INTRAMUSCULAR; INTRAVENOUS at 13:56

## 2022-01-27 RX ADMIN — ACETAMINOPHEN 975 MG: 325 TABLET ORAL at 13:15

## 2022-01-27 RX ADMIN — PROPOFOL 160 MCG/KG/MIN: 10 INJECTION, EMULSION INTRAVENOUS at 13:25

## 2022-01-27 ASSESSMENT — COPD QUESTIONNAIRES: COPD: 0

## 2022-01-27 ASSESSMENT — LIFESTYLE VARIABLES: TOBACCO_USE: 0

## 2022-01-27 NOTE — ANESTHESIA CARE TRANSFER NOTE
Patient: Jes Mistry    Procedure: Procedure(s):  EXCISION, MASS, NECK       Diagnosis: Neck mass [R22.1]  Diagnosis Additional Information: No value filed.    Anesthesia Type:   General     Note:    Oropharynx: oropharynx clear of all foreign objects and spontaneously breathing  Level of Consciousness: drowsy  Oxygen Supplementation: face mask  Level of Supplemental Oxygen (L/min / FiO2): 6  Independent Airway: airway patency satisfactory and stable  Dentition: dentition unchanged  Vital Signs Stable: post-procedure vital signs reviewed and stable  Report to RN Given: handoff report given  Patient transferred to: PACU    Handoff Report: Identifed the Patient, Identified the Reponsible Provider, Reviewed the pertinent medical history, Discussed the surgical course, Reviewed Intra-OP anesthesia mangement and issues during anesthesia, Set expectations for post-procedure period and Allowed opportunity for questions and acknowledgement of understanding      Vitals:  Vitals Value Taken Time   /60 01/27/22 1410   Temp 97.9  F (36.6  C) 01/27/22 1410   Pulse 87 01/27/22 1412   Resp 16 01/27/22 1410   SpO2 99 % 01/27/22 1412   Vitals shown include unvalidated device data.    Electronically Signed By: NARA Magaña CRNA  January 27, 2022  2:15 PM

## 2022-01-27 NOTE — ANESTHESIA PROCEDURE NOTES
Airway       Patient location during procedure: OR       Procedure Start/Stop Times: 1/27/2022 1:29 PM  Staff -        CRNA: Molly Mead APRN CRNA       Performed By: CRNA  Consent for Airway        Urgency: elective  Indications and Patient Condition       Indications for airway management: walter-procedural       Induction type:intravenous       Mask difficulty assessment: 1 - vent by mask    Final Airway Details       Final airway type: endotracheal airway       Successful airway: ETT - single  Endotracheal Airway Details        ETT size (mm): 7.0       Cuffed: yes       Successful intubation technique: direct laryngoscopy       DL Blade Type: Ng 2       Grade View of Cords: 1       Adjucts: stylet and tooth guard       Position: Right       Measured from: lips       Secured at (cm): 20    Post intubation assessment        Placement verified by: capnometry, equal breath sounds and chest rise        Number of attempts at approach: 1       Number of other approaches attempted: 0       Secured with: silk tape       Ease of procedure: easy       Dentition: Intact and Unchanged

## 2022-01-27 NOTE — OP NOTE
Operative Note    Name:  Jes Mistry  Location: [facility]  Procedure Date:  1/27/2022  PCP:  Cindy Michael    Surgery Performed:  Procedure/Surgery Information   Procedure: Procedure(s):  EXCISION, MASS, NECK   Surgeon(s): Surgeon(s) and Role:     * Elia Parson MD - Primary   Specimens: ID Type Source Tests Collected by Time Destination   1 : Neck Mass Tissue Neck SURGICAL PATHOLOGY EXAM Elia Parson MD 1/27/2022  1:49 PM                Pre-Procedure Diagnosis:  Neck mass [R22.1]    Post-Procedure Diagnosis:    Neck mass [R22.1]    Anesthesia:  General     Past Medical History:   Diagnosis Date     Complication of anesthesia     slow to wake     History of anesthesia complications     Slow to wake up     HTN (hypertension)      Nonsmoker      Thyroid nodule 2011       Patient Active Problem List    Diagnosis Date Noted     Neck mass 12/01/2021     Priority: Medium     Added automatically from request for surgery [id number]       Screening for cervical cancer 11/22/2021     Priority: Medium     2016 negative per pt report  11/10.21 NIL pap, neg HPV. On Tamoxifen. Cotest in 3 years       Malignant neoplasm of lower-inner quadrant of right breast of female, estrogen receptor negative (H) 10/06/2020     Priority: Medium     Added automatically from request for surgery [id number]         HTN (hypertension)      Priority: Medium     Nonsmoker      Priority: Medium       Findings:  6 x 3 cm lipomatous mass on the left posterior base of the neck    Operative Report:    Patient is brought to the operating room given general endotracheal anesthesia turned in the lateral position and sterilely prepped and draped in the usual surgical fashion the timeout process is undertaken.    A 4 cm incision was made over the lesion in a transverse fashion subcutaneous tissues were dissected through with sharp dissection of electrocautery and the subcutaneous mass was identified and dissected around  circumferentially.  I mobilized it laterally and then worked back towards the medial extent of the subcutaneous mass.  It was  from surrounding tissues and excised en bloc.  The operative field was evaluated for hemostasis and electrocautery was used to achieve complete hemostasis.  The subcutaneous tissues were drawn together with interrupted 3-0 Vicryl suture and the skin was closed with a running 4-0 subcuticular Monocryl stitch.  The wound was dressed with Telfa and a Tegaderm.  The patient was extubated and taken to recovery without complication.    Estimated Blood Loss:   10 cc       Drains:        Implants:  * No implants in log *    Complications:    None    Elia Parson MD     Date: 1/27/2022  Time: 2:33 PM

## 2022-01-27 NOTE — ANESTHESIA POSTPROCEDURE EVALUATION
Patient: Jes Mistry    Procedure: Procedure(s):  EXCISION, MASS, NECK       Diagnosis:Neck mass [R22.1]  Diagnosis Additional Information: No value filed.    Anesthesia Type:  General    Note:  Disposition: Outpatient   Postop Pain Control: Uneventful            Sign Out: Well controlled pain   PONV: No   Neuro/Psych: Uneventful            Sign Out: Acceptable/Baseline neuro status   Airway/Respiratory: Uneventful            Sign Out: Acceptable/Baseline resp. status   CV/Hemodynamics: Uneventful            Sign Out: Acceptable CV status; No obvious hypovolemia; No obvious fluid overload   Other NRE: NONE   DID A NON-ROUTINE EVENT OCCUR? No           Last vitals:  Vitals Value Taken Time   /78 01/27/22 1445   Temp 96.9  F (36.1  C) 01/27/22 1431   Pulse 69 01/27/22 1500   Resp 16 01/27/22 1431   SpO2 99 % 01/27/22 1500       Electronically Signed By: Jd Roberts MD  January 27, 2022  3:31 PM

## 2022-01-27 NOTE — H&P
HISTORY AND PHYSICAL UPDATE:    I have assessed the patient and evaluated the chart and and verify the patient's clinical status has not changed since our last documentation.  The patient is ready to move forward with the planned surgery.  Lungs: Clear to auscultation  Heart: Regular rate and rhythm    HPI:  This is a 46 year old female here today with mass on the back Left side of the neck.  It is soft to palpation.  She had a Lipoma removed from this area once before ().        Allergies:Sulfa (sulfonamide antibiotics) [sulfa drugs] and Latex          Past Medical History:   Diagnosis Date     History of anesthesia complications       Slow to wake up     HTN (hypertension)       Nonsmoker       Thyroid nodule          Past Surgical History:   Procedure Laterality Date     ABDOMEN SURGERY          SECTION         INSERT INTRACORONARY STENT         AL INSJ PRPH CTR VAD W/SUBQ PORT AGE 5 YR/> Left 10/8/2020     Procedure: Port Placement;  Surgeon: Kimberly Will MD;  Location: [facility];  Service: General     AL MASTECTOMY, SIMPLE, COMPLETE Bilateral 3/24/2021     Procedure: Bilateral Mastectomies; Right [place] Lymph Node Biopsy; Port Removal;  Surgeon: Kimberly Will MD;  Location: [facility];  Service: General     US BREAST CLIP PLACEMENT RIGHT Right 10/8/2020      SENTINEL NODE INJECTION   3/24/2021         CURRENT MEDS:         Current Outpatient Medications   Medication Sig Dispense Refill     lisinopril-hydrochlorothiazide (ZESTORETIC) 10-12.5 MG tablet [LISINOPRIL-HYDROCHLOROTHIAZIDE (PRINZIDE,ZESTORETIC) 10-12.5 MG PER TABLET] TAKE 1 TABLET BY MOUTH EVERY DAY 90 tablet 3     tamoxifen (NOLVADEX) 20 MG tablet Take 1 tablet (20 mg) by mouth daily 90 tablet 3               Family History   Problem Relation Age of Onset     Hypertension Mother       Hypertension Father       Cancer Maternal Grandmother 70.00         unknown-abdominal?     Cancer Maternal Grandfather 70.00          liver?      Family history is not pertinent to this patients Chief Complaint.      reports that she has never smoked. She has never used smokeless tobacco. She reports that she does not drink alcohol and does not use drugs.     Review of Systems -   10 point Review of systems is negative except for; as mentioned above in HPI and PMHx     There were no vitals taken for this visit.  There is no height or weight on file to calculate BMI.     EXAM:  GENERAL: Well developed female  HEENT: 3-5 cm soft tissue mass in the Sub Cut space on the back of the neck.  It is tender to palpation.  EOMI, Anicteric Sclera, Moist Mucous Membranes,  In Mouth the pt does not have redness or bleeding gums  CARDIOVASCULAR: RRR w/out murmur   CHEST/LUNG: Clear to Auscultation  ABDOMEN:  Non tender to palpation, +BS  MUSCULOSKELETAL:  No deformities with good range of motion in all extremities  NEURO: She is ambulatory with good strength in both legs.  HEME/LYMPH: No Cervical Adenopathy or tenderness.      IMAGES:  I evaluated these images myself and None     Assessment/Plan:  Lipoma on the back of the neck Left Side.  It is tender to palpation but soft without signs of infection.     Excisional Biopsy in the surgical center.      Elia Parson  [facility], [facility]; surgeons  [phone]

## 2022-01-27 NOTE — ANESTHESIA POSTPROCEDURE EVALUATION
Patient: Jes Mistry    Procedure: Procedure(s):  EXCISION, MASS, NECK       Diagnosis:Neck mass [R22.1]  Diagnosis Additional Information: No value filed.    Anesthesia Type:  General    Note:  Disposition: Outpatient   Postop Pain Control: Uneventful            Sign Out: Well controlled pain   PONV: No   Neuro/Psych: Uneventful            Sign Out: Acceptable/Baseline neuro status   Airway/Respiratory: Uneventful            Sign Out: Acceptable/Baseline resp. status   CV/Hemodynamics: Uneventful            Sign Out: Acceptable CV status; No obvious hypovolemia; No obvious fluid overload   Other NRE: NONE   DID A NON-ROUTINE EVENT OCCUR? No           Last vitals:  Vitals Value Taken Time   /78 01/27/22 1445   Temp 96.9  F (36.1  C) 01/27/22 1431   Pulse 69 01/27/22 1500   Resp 16 01/27/22 1431   SpO2 99 % 01/27/22 1500       Electronically Signed By: Jd Roberts MD  January 27, 2022  4:15 PM

## 2022-02-07 ENCOUNTER — LAB (OUTPATIENT)
Dept: INFUSION THERAPY | Facility: HOSPITAL | Age: 47
End: 2022-02-07
Attending: INTERNAL MEDICINE
Payer: COMMERCIAL

## 2022-02-07 ENCOUNTER — ONCOLOGY VISIT (OUTPATIENT)
Dept: ONCOLOGY | Facility: HOSPITAL | Age: 47
End: 2022-02-07
Attending: INTERNAL MEDICINE
Payer: COMMERCIAL

## 2022-02-07 VITALS
WEIGHT: 126 LBS | TEMPERATURE: 97.6 F | HEART RATE: 72 BPM | BODY MASS INDEX: 21.8 KG/M2 | DIASTOLIC BLOOD PRESSURE: 70 MMHG | RESPIRATION RATE: 16 BRPM | SYSTOLIC BLOOD PRESSURE: 117 MMHG | OXYGEN SATURATION: 98 %

## 2022-02-07 DIAGNOSIS — C50.311 MALIGNANT NEOPLASM OF LOWER-INNER QUADRANT OF RIGHT BREAST OF FEMALE, ESTROGEN RECEPTOR NEGATIVE (H): Primary | ICD-10-CM

## 2022-02-07 DIAGNOSIS — C50.311 MALIGNANT NEOPLASM OF LOWER-INNER QUADRANT OF RIGHT BREAST OF FEMALE, ESTROGEN RECEPTOR NEGATIVE (H): ICD-10-CM

## 2022-02-07 DIAGNOSIS — Z17.1 MALIGNANT NEOPLASM OF LOWER-INNER QUADRANT OF RIGHT BREAST OF FEMALE, ESTROGEN RECEPTOR NEGATIVE (H): ICD-10-CM

## 2022-02-07 DIAGNOSIS — Z17.1 MALIGNANT NEOPLASM OF LOWER-INNER QUADRANT OF RIGHT BREAST OF FEMALE, ESTROGEN RECEPTOR NEGATIVE (H): Primary | ICD-10-CM

## 2022-02-07 LAB
ANION GAP SERPL CALCULATED.3IONS-SCNC: 8 MMOL/L (ref 5–18)
BUN SERPL-MCNC: 14 MG/DL (ref 8–22)
CALCIUM SERPL-MCNC: 9.5 MG/DL (ref 8.5–10.5)
CHLORIDE BLD-SCNC: 103 MMOL/L (ref 98–107)
CO2 SERPL-SCNC: 30 MMOL/L (ref 22–31)
CREAT SERPL-MCNC: 0.71 MG/DL (ref 0.6–1.1)
ERYTHROCYTE [DISTWIDTH] IN BLOOD BY AUTOMATED COUNT: 13.1 % (ref 10–15)
GFR SERPL CREATININE-BSD FRML MDRD: >90 ML/MIN/1.73M2
GLUCOSE BLD-MCNC: 121 MG/DL (ref 70–125)
HCT VFR BLD AUTO: 43.2 % (ref 35–47)
HGB BLD-MCNC: 14.4 G/DL (ref 11.7–15.7)
MCH RBC QN AUTO: 30.3 PG (ref 26.5–33)
MCHC RBC AUTO-ENTMCNC: 33.3 G/DL (ref 31.5–36.5)
MCV RBC AUTO: 91 FL (ref 78–100)
PLATELET # BLD AUTO: 260 10E3/UL (ref 150–450)
POTASSIUM BLD-SCNC: 3.7 MMOL/L (ref 3.5–5)
RBC # BLD AUTO: 4.75 10E6/UL (ref 3.8–5.2)
SODIUM SERPL-SCNC: 141 MMOL/L (ref 136–145)
WBC # BLD AUTO: 5 10E3/UL (ref 4–11)

## 2022-02-07 PROCEDURE — 99213 OFFICE O/P EST LOW 20 MIN: CPT | Performed by: NURSE PRACTITIONER

## 2022-02-07 PROCEDURE — 36415 COLL VENOUS BLD VENIPUNCTURE: CPT

## 2022-02-07 PROCEDURE — 80048 BASIC METABOLIC PNL TOTAL CA: CPT

## 2022-02-07 PROCEDURE — G0463 HOSPITAL OUTPT CLINIC VISIT: HCPCS

## 2022-02-07 PROCEDURE — 85027 COMPLETE CBC AUTOMATED: CPT

## 2022-02-07 PROCEDURE — G0463 HOSPITAL OUTPT CLINIC VISIT: HCPCS | Mod: 25

## 2022-02-07 ASSESSMENT — PAIN SCALES - GENERAL: PAINLEVEL: NO PAIN (0)

## 2022-02-07 NOTE — PROGRESS NOTES
[facility] Hematology and Oncology Progress Note    Patient: Jes Mistry  MRN: [id number]  Date of Service: Feb 7, 2022          Reason for Visit    Chief Complaint   Patient presents with     Oncology Clinic Visit       Assessment and Plan    Cancer Staging  Malignant neoplasm of lower-inner quadrant of right breast of female, estrogen receptor negative (H)  Staging form: Breast, AJCC 8th Edition  - Clinical stage from 10/7/2020: Stage IIB (cT2, cN0, cM0, G3, ER-, TN-, HER2-) - Signed by Kaela Plunkett APRN CNP on 2/7/2022  - Pathologic stage from 3/22/2021: No Stage Recommended (ypT1b, pN0, cM0, G2, ER+, TN-, HER2-) - Signed by Kaela Plunkett APRN CNP on 2/7/2022    1. Breast cancer, Stage IIB, biopsy was triple negative, mastectomy did show ER+ at 30%: pt had neoadjuvant chemo with residual disease (6mm). Now on Tamoxifen. Doing well. Tolerating. Will continue current regimen. No evidence of local recurrence. Will return in 4 months.     2. Amenorrhea: Has not had a period since starting chemotherapy.  She did just have a vaginal exam with Pap smear this fall.  She states every once while she does feel like she has a little pink discharge but it is not spotting.  No blood.  We will check estradiol levels at her next appointment if they indicate postmenopausal status we may switch to an aromatase inhibitor.  Courage her to call us if she has any vaginal spotting or bleeding.    ECOG Performance    0 - Independent    Distress Screening (within last 30 days)    No data recorded     Pain  Pain Score: No Pain (0)    Problem List    Patient Active Problem List   Diagnosis     HTN (hypertension)     Nonsmoker     Malignant neoplasm of lower-inner quadrant of right breast of female, estrogen receptor negative (H)     Screening for cervical cancer     Neck mass        ______________________________________________________________________________    History of Present Illness    Diagnosis:     1.   Carcinoma of the right breast: High-grade.  6 o'clock position.  Diagnosed September 2020. Estrogen receptor positive in 1%.  LA and HER-2 negative.  Measured 24 x 13 x 19 mm on ultrasound. Ultrasound of the axilla was negative.  2 sentinel nodes are negative.     Treatment:     Neoadjuvant chemotherapy with Adriamycin and Cytoxan started October 9, 2020.  Weekly Taxol started December 3, 2020.     Right-sided mastectomy performed March 21, 2021: Final pathology shows residual invasive ductal carcinoma, grade 2, 6 x 6 x 4 mm.  Negative margins.  ER positive at 30%.  HER-2/emily negative by FISH.     Tamoxifen initiated April 2021.     Interim History:  She is here today for a follow-up visit.  She was last seen 4 months ago.  Overall she says she is feeling really quite well.  She says she feels totally back to normal.  Her energy levels are good.  She is working full-time.  She denies any new signs or symptoms.  She denies any weight loss.  Denies any new bone or back pain.  She did have a lipoma removed about 2 weeks ago on the back of her neck.  Denies any significant pain.  Denies any fevers or infectious complaints.        Review of Systems    Pertinent items are noted in HPI.    Past History    Past Medical History:   Diagnosis Date     Complication of anesthesia     slow to wake     History of anesthesia complications     Slow to wake up     HTN (hypertension)      Nonsmoker      Thyroid nodule 2011       PHYSICAL EXAM  /70   Pulse 72   Temp 97.6  F (36.4  C)   Resp 16   Wt 57.2 kg (126 lb)   SpO2 98%   BMI 21.80 kg/m      GENERAL: no acute distress. Cooperative in conversation. Here alone. Mask on  RESP: Regular respiratory rate. No expiratory wheezes   MUSCULOSKELETAL: no bilateral leg swelling  NEURO: non focal. Alert and oriented x3.   PSYCH: within normal limits. No depression or anxiety.  SKIN: exposed skin is dry intact.  The incision for her lipoma removal on the back of her neck looks  good.  No redness or warmth.  There still is some scabbing over the incision on the left side and in the center.  BREAST: Chest wall exam done.  She status post mastectomies.  There is no abnormal rashes lesions noted.  LYMPH: No axillary lymphadenopathy bilaterally.    Lab Results    Recent Results (from the past 168 hour(s))   CBC with platelets   Result Value Ref Range    WBC Count 5.0 4.0 - 11.0 10e3/uL    RBC Count 4.75 3.80 - 5.20 10e6/uL    Hemoglobin 14.4 11.7 - 15.7 g/dL    Hematocrit 43.2 35.0 - 47.0 %    MCV 91 78 - 100 fL    MCH 30.3 26.5 - 33.0 pg    MCHC 33.3 31.5 - 36.5 g/dL    RDW 13.1 10.0 - 15.0 %    Platelet Count 260 150 - 450 10e3/uL   Basic metabolic panel   Result Value Ref Range    Sodium 141 136 - 145 mmol/L    Potassium 3.7 3.5 - 5.0 mmol/L    Chloride 103 98 - 107 mmol/L    Carbon Dioxide (CO2) 30 22 - 31 mmol/L    Anion Gap 8 5 - 18 mmol/L    Urea Nitrogen 14 8 - 22 mg/dL    Creatinine 0.71 0.60 - 1.10 mg/dL    Calcium 9.5 8.5 - 10.5 mg/dL    Glucose 121 70 - 125 mg/dL    GFR Estimate >90 >60 mL/min/1.73m2       Imaging    No results found.      Signed by: NARA Chan CNP

## 2022-02-07 NOTE — PROGRESS NOTES
"Oncology Rooming Note    February 7, 2022 9:30 AM   Jes Mistry is a 46 year old female who presents for:    Chief Complaint   Patient presents with     Oncology Clinic Visit     Initial Vitals: /70   Pulse 72   Temp 97.6  F (36.4  C)   Resp 16   Wt 57.2 kg (126 lb)   SpO2 98%   BMI 21.80 kg/m   Estimated body mass index is 21.8 kg/m  as calculated from the following:    Height as of 1/24/22: 1.619 m (5' 3.75\").    Weight as of this encounter: 57.2 kg (126 lb). Body surface area is 1.6 meters squared.  No Pain (0) Comment: Data Unavailable   No LMP recorded. (Menstrual status: Chemotherapy).  Allergies reviewed: Yes  Medications reviewed: Yes    Medications: Medication refills not needed today.  Pharmacy name entered into [employer]:    [facility]/PH[facility]ACY #[id number] - [place], [place] - [address] [place] [address] E  [facility] [place], [place] - [address] [place] [address]    Clinical concerns: Spot on cheek.       Miriam Luis RN            "

## 2022-02-07 NOTE — LETTER
2/7/2022         RE: Jes Mistry  [address] [facility] [address]        Dear Colleague,    Thank you for referring your patient, Jes Mistry, to the [facility] [place]. Please see a copy of my visit note below.    [facility] Hematology and Oncology Progress Note    Patient: Jes Mistry  MRN: [id number]  Date of Service: Feb 7, 2022          Reason for Visit    Chief Complaint   Patient presents with     Oncology Clinic Visit       Assessment and Plan    Cancer Staging  Malignant neoplasm of lower-inner quadrant of right breast of female, estrogen receptor negative (H)  Staging form: Breast, AJCC 8th Edition  - Clinical stage from 10/7/2020: Stage IIB (cT2, cN0, cM0, G3, ER-, TN-, HER2-) - Signed by Kaela Plunkett, NARA CNP on 2/7/2022  - Pathologic stage from 3/22/2021: No Stage Recommended (ypT1b, pN0, cM0, G2, ER+, TN-, HER2-) - Signed by Kaela Plunkett, NARA CNP on 2/7/2022    1. Breast cancer, Stage IIB, biopsy was triple negative, mastectomy did show ER+ at 30%: pt had neoadjuvant chemo with residual disease (6mm). Now on Tamoxifen. Doing well. Tolerating. Will continue current regimen. No evidence of local recurrence. Will return in 4 months.     2. Amenorrhea: Has not had a period since starting chemotherapy.  She did just have a vaginal exam with Pap smear this fall.  She states every once while she does feel like she has a little pink discharge but it is not spotting.  No blood.  We will check estradiol levels at her next appointment if they indicate postmenopausal status we may switch to an aromatase inhibitor.  Courage her to call us if she has any vaginal spotting or bleeding.    ECOG Performance    0 - Independent    Distress Screening (within last 30 days)    No data recorded     Pain  Pain Score: No Pain (0)    Problem List    Patient Active Problem List   Diagnosis     HTN (hypertension)     Nonsmoker     Malignant neoplasm of  lower-inner quadrant of right breast of female, estrogen receptor negative (H)     Screening for cervical cancer     Neck mass        ______________________________________________________________________________    History of Present Illness    Diagnosis:     1.  Carcinoma of the right breast: High-grade.  6 o'clock position.  Diagnosed September 2020. Estrogen receptor positive in 1%.  LA and HER-2 negative.  Measured 24 x 13 x 19 mm on ultrasound. Ultrasound of the axilla was negative.  2 sentinel nodes are negative.     Treatment:     Neoadjuvant chemotherapy with Adriamycin and Cytoxan started October 9, 2020.  Weekly Taxol started December 3, 2020.     Right-sided mastectomy performed March 21, 2021: Final pathology shows residual invasive ductal carcinoma, grade 2, 6 x 6 x 4 mm.  Negative margins.  ER positive at 30%.  HER-2/emily negative by FISH.     Tamoxifen initiated April 2021.     Interim History:  She is here today for a follow-up visit.  She was last seen 4 months ago.  Overall she says she is feeling really quite well.  She says she feels totally back to normal.  Her energy levels are good.  She is working full-time.  She denies any new signs or symptoms.  She denies any weight loss.  Denies any new bone or back pain.  She did have a lipoma removed about 2 weeks ago on the back of her neck.  Denies any significant pain.  Denies any fevers or infectious complaints.        Review of Systems    Pertinent items are noted in HPI.    Past History    Past Medical History:   Diagnosis Date     Complication of anesthesia     slow to wake     History of anesthesia complications     Slow to wake up     HTN (hypertension)      Nonsmoker      Thyroid nodule 2011       PHYSICAL EXAM  /70   Pulse 72   Temp 97.6  F (36.4  C)   Resp 16   Wt 57.2 kg (126 lb)   SpO2 98%   BMI 21.80 kg/m      GENERAL: no acute distress. Cooperative in conversation. Here alone. Mask on  RESP: Regular respiratory rate. No  "expiratory wheezes   MUSCULOSKELETAL: no bilateral leg swelling  NEURO: non focal. Alert and oriented x3.   PSYCH: within normal limits. No depression or anxiety.  SKIN: exposed skin is dry intact.  The incision for her lipoma removal on the back of her neck looks good.  No redness or warmth.  There still is some scabbing over the incision on the left side and in the center.  BREAST: Chest wall exam done.  She status post mastectomies.  There is no abnormal rashes lesions noted.  LYMPH: No axillary lymphadenopathy bilaterally.    Lab Results    Recent Results (from the past 168 hour(s))   CBC with platelets   Result Value Ref Range    WBC Count 5.0 4.0 - 11.0 10e3/uL    RBC Count 4.75 3.80 - 5.20 10e6/uL    Hemoglobin 14.4 11.7 - 15.7 g/dL    Hematocrit 43.2 35.0 - 47.0 %    MCV 91 78 - 100 fL    MCH 30.3 26.5 - 33.0 pg    MCHC 33.3 31.5 - 36.5 g/dL    RDW 13.1 10.0 - 15.0 %    Platelet Count 260 150 - 450 10e3/uL   Basic metabolic panel   Result Value Ref Range    Sodium 141 136 - 145 mmol/L    Potassium 3.7 3.5 - 5.0 mmol/L    Chloride 103 98 - 107 mmol/L    Carbon Dioxide (CO2) 30 22 - 31 mmol/L    Anion Gap 8 5 - 18 mmol/L    Urea Nitrogen 14 8 - 22 mg/dL    Creatinine 0.71 0.60 - 1.10 mg/dL    Calcium 9.5 8.5 - 10.5 mg/dL    Glucose 121 70 - 125 mg/dL    GFR Estimate >90 >60 mL/min/1.73m2       Imaging    No results found.      Signed by: NARA Chan CNP    Oncology Rooming Note    February 7, 2022 9:30 AM   Jes Mistry is a 46 year old female who presents for:    Chief Complaint   Patient presents with     Oncology Clinic Visit     Initial Vitals: /70   Pulse 72   Temp 97.6  F (36.4  C)   Resp 16   Wt 57.2 kg (126 lb)   SpO2 98%   BMI 21.80 kg/m   Estimated body mass index is 21.8 kg/m  as calculated from the following:    Height as of 1/24/22: 1.619 m (5' 3.75\").    Weight as of this encounter: 57.2 kg (126 lb). Body surface area is 1.6 meters squared.  No Pain (0) Comment: " Data Unavailable   No LMP recorded. (Menstrual status: Chemotherapy).  Allergies reviewed: Yes  Medications reviewed: Yes    Medications: Medication refills not needed today.  Pharmacy name entered into [employer]:    [facility]ACY #[id number] - [address]  [facility] [place], [place] - [address] [place] [address]    Clinical concerns: Spot on cheek.       Miriam Luis RN                Again, thank you for allowing me to participate in the care of your patient.        Sincerely,        NARA Chan CNP

## 2022-02-10 ENCOUNTER — VIRTUAL VISIT (OUTPATIENT)
Dept: SURGERY | Facility: CLINIC | Age: 47
End: 2022-02-10
Payer: COMMERCIAL

## 2022-02-10 DIAGNOSIS — R22.1 NECK MASS: Primary | ICD-10-CM

## 2022-02-10 PROCEDURE — 99024 POSTOP FOLLOW-UP VISIT: CPT | Performed by: PHYSICIAN ASSISTANT

## 2022-02-10 NOTE — LETTER
2/10/2022         RE: Jes Mistry  [address] [facility] [address]        Dear Colleague,    Thank you for referring your patient, Jes Mistry, to the [facility] SURGERY CLINIC AND BARIATRICS CARE [place]. Please see a copy of my visit note below.    Telemedicine Visit: The patient's condition can be safely assessed and treated via telephone telemedicine encounter.      Reason for Telemedicine Visit: Patient has requested telehealth visit    Originating Site (Patient Location): Patient's home    Distant Site (Provider Location): [facility]    Consent:  The patient/guardian has verbally consented to: the potential risks and benefits of telemedicine (video visit) versus in person care; bill my insurance or make self-payment for services provided; and responsibility for payment of non-covered services.     Mode of Communication:  telephone    As the provider I attest to compliance with applicable laws and regulations related to telemedicine.       HPI: Pt is s/p lipoma excision of neck with Dr. Parson on 1/27.   she is doing well.  Pain is well controlled:  Yes. No difficulties with the surgical wound/wounds, no report of erythema or drainage.  she is eating well and denies fever and chills. Bowel function has returned to normal.    Pathology results reviewed with her.     Assessment/Plan: Doing well after surgery and should follow up as needed.    August Russell PA-C  [phone]  General Surgery          Again, thank you for allowing me to participate in the care of your patient.        Sincerely,        August Russell PA-C

## 2022-02-10 NOTE — PROGRESS NOTES
Telemedicine Visit: The patient's condition can be safely assessed and treated via telephone telemedicine encounter.      Reason for Telemedicine Visit: Patient has requested telehealth visit    Originating Site (Patient Location): Patient's home    Distant Site (Provider Location): [facility]    Consent:  The patient/guardian has verbally consented to: the potential risks and benefits of telemedicine (video visit) versus in person care; bill my insurance or make self-payment for services provided; and responsibility for payment of non-covered services.     Mode of Communication:  telephone    As the provider I attest to compliance with applicable laws and regulations related to telemedicine.       HPI: Pt is s/p lipoma excision of neck with Dr. Parson on 1/27.   she is doing well.  Pain is well controlled:  Yes. No difficulties with the surgical wound/wounds, no report of erythema or drainage.  she is eating well and denies fever and chills. Bowel function has returned to normal.    Pathology results reviewed with her.     Assessment/Plan: Doing well after surgery and should follow up as needed.    August Russell PA-C  [phone]  General Surgery

## 2022-02-21 ENCOUNTER — OFFICE VISIT (OUTPATIENT)
Dept: INTERNAL MEDICINE | Facility: CLINIC | Age: 47
End: 2022-02-21
Payer: COMMERCIAL

## 2022-02-21 ENCOUNTER — NURSE TRIAGE (OUTPATIENT)
Dept: NURSING | Facility: CLINIC | Age: 47
End: 2022-02-21

## 2022-02-21 VITALS
OXYGEN SATURATION: 97 % | WEIGHT: 128 LBS | SYSTOLIC BLOOD PRESSURE: 116 MMHG | BODY MASS INDEX: 21.85 KG/M2 | DIASTOLIC BLOOD PRESSURE: 70 MMHG | HEIGHT: 64 IN | HEART RATE: 82 BPM

## 2022-02-21 DIAGNOSIS — Z17.1 MALIGNANT NEOPLASM OF LOWER-INNER QUADRANT OF RIGHT BREAST OF FEMALE, ESTROGEN RECEPTOR NEGATIVE (H): ICD-10-CM

## 2022-02-21 DIAGNOSIS — C50.311 MALIGNANT NEOPLASM OF LOWER-INNER QUADRANT OF RIGHT BREAST OF FEMALE, ESTROGEN RECEPTOR NEGATIVE (H): ICD-10-CM

## 2022-02-21 DIAGNOSIS — K11.20 SIALADENITIS: Primary | ICD-10-CM

## 2022-02-21 LAB
ALBUMIN SERPL-MCNC: 4 G/DL (ref 3.5–5)
ALP SERPL-CCNC: 72 U/L (ref 45–120)
ALT SERPL W P-5'-P-CCNC: 31 U/L (ref 0–45)
AMYLASE SERPL-CCNC: 49 U/L (ref 5–120)
ANION GAP SERPL CALCULATED.3IONS-SCNC: 11 MMOL/L (ref 5–18)
AST SERPL W P-5'-P-CCNC: 26 U/L (ref 0–40)
BASOPHILS # BLD AUTO: 0 10E3/UL (ref 0–0.2)
BASOPHILS NFR BLD AUTO: 1 %
BILIRUB SERPL-MCNC: 0.3 MG/DL (ref 0–1)
BUN SERPL-MCNC: 12 MG/DL (ref 8–22)
CALCIUM SERPL-MCNC: 9.6 MG/DL (ref 8.5–10.5)
CHLORIDE BLD-SCNC: 102 MMOL/L (ref 98–107)
CO2 SERPL-SCNC: 26 MMOL/L (ref 22–31)
CREAT SERPL-MCNC: 0.68 MG/DL (ref 0.6–1.1)
EOSINOPHIL # BLD AUTO: 0.2 10E3/UL (ref 0–0.7)
EOSINOPHIL NFR BLD AUTO: 3 %
ERYTHROCYTE [DISTWIDTH] IN BLOOD BY AUTOMATED COUNT: 12.9 % (ref 10–15)
GFR SERPL CREATININE-BSD FRML MDRD: >90 ML/MIN/1.73M2
GLUCOSE BLD-MCNC: 87 MG/DL (ref 70–125)
HCT VFR BLD AUTO: 39.7 % (ref 35–47)
HGB BLD-MCNC: 13.5 G/DL (ref 11.7–15.7)
IMM GRANULOCYTES # BLD: 0 10E3/UL
IMM GRANULOCYTES NFR BLD: 0 %
LYMPHOCYTES # BLD AUTO: 1.4 10E3/UL (ref 0.8–5.3)
LYMPHOCYTES NFR BLD AUTO: 23 %
MCH RBC QN AUTO: 30.2 PG (ref 26.5–33)
MCHC RBC AUTO-ENTMCNC: 34 G/DL (ref 31.5–36.5)
MCV RBC AUTO: 89 FL (ref 78–100)
MONOCYTES # BLD AUTO: 0.5 10E3/UL (ref 0–1.3)
MONOCYTES NFR BLD AUTO: 7 %
NEUTROPHILS # BLD AUTO: 4.2 10E3/UL (ref 1.6–8.3)
NEUTROPHILS NFR BLD AUTO: 67 %
PLATELET # BLD AUTO: 223 10E3/UL (ref 150–450)
POTASSIUM BLD-SCNC: 3.9 MMOL/L (ref 3.5–5)
PROT SERPL-MCNC: 6.9 G/DL (ref 6–8)
RBC # BLD AUTO: 4.47 10E6/UL (ref 3.8–5.2)
SODIUM SERPL-SCNC: 139 MMOL/L (ref 136–145)
WBC # BLD AUTO: 6.2 10E3/UL (ref 4–11)

## 2022-02-21 PROCEDURE — 80053 COMPREHEN METABOLIC PANEL: CPT | Performed by: INTERNAL MEDICINE

## 2022-02-21 PROCEDURE — 85025 COMPLETE CBC W/AUTO DIFF WBC: CPT | Performed by: INTERNAL MEDICINE

## 2022-02-21 PROCEDURE — 36415 COLL VENOUS BLD VENIPUNCTURE: CPT | Performed by: INTERNAL MEDICINE

## 2022-02-21 PROCEDURE — 99214 OFFICE O/P EST MOD 30 MIN: CPT | Performed by: INTERNAL MEDICINE

## 2022-02-21 PROCEDURE — 82150 ASSAY OF AMYLASE: CPT | Performed by: INTERNAL MEDICINE

## 2022-02-21 PROCEDURE — 82670 ASSAY OF TOTAL ESTRADIOL: CPT | Performed by: INTERNAL MEDICINE

## 2022-02-21 RX ORDER — MULTIPLE VITAMINS W/ MINERALS TAB 9MG-400MCG
1 TAB ORAL DAILY
COMMUNITY

## 2022-02-21 RX ORDER — CHLORAL HYDRATE 500 MG
2 CAPSULE ORAL DAILY
COMMUNITY
End: 2022-11-09

## 2022-02-21 NOTE — PROGRESS NOTES
"Assessment/Plan:    Sialadenitis.  Augmentin 875 twice daily for 7 days push fluids rest discussed check hemogram serum amylase.    History of breast cancer with bilateral mastectomy triple negative initially and history of chemotherapy    Urticaria uncertain etiology.  Added stress working for .  Tax season    Check with PCP 1 week regarding status.    25 minutes spent on the date of the encounter doing chart review, patient visit and documentation     Subjective:  Jes Mistry is a 47 year old female presents for the following health issues swelling right neck submandibular gland?.  3 days duration also some discharge from the right medial canthus and lacrimal duct area.  Prior history of breast cancer right side status post bilateral mastectomy left side prophylactic..  Of hives none today previously right eye was puffy.  Breast cancer status in terms of negative nodes and triple negative.  Chemotherapy has been completed now on tamoxifen because after chemotherapy triple negative went to 30% estrogen receptor positive.  Added emotional stress working for .  Tax season.  Allergies sulfa and sensitivity to latex.    ROS:  No blood in stool or urine denies chest pain shortness of breath.    Objective:  /70 (BP Location: Right arm, Patient Position: Sitting)   Pulse 82   Ht 1.619 m (5' 3.75\")   Wt 58.1 kg (128 lb)   SpO2 97%   BMI 22.14 kg/m    Limited exam there is some swelling in the right submandibular gland area the right eye looks normal grossly.  She was nonacute distress she was nontoxic all of her vital signs are stable.    Rodrigo Bravo MD  Internal Medicine    "

## 2022-02-21 NOTE — TELEPHONE ENCOUNTER
Triage call    Patient calling she has a red patch on the back of her neck that is tender to the touch and is bright red like a sun burn  She had hives on her face the other day  But that had cleared she did try benadryl cream but ut did not help.  She denies itching.    Per protocol see in office today.  Care advice given.  Verbalizes understanding and agrees with plan.  Transferred to scheduling.    Laura Schwarz RN   [facility] Nurse Advisor  10:30 AM 2/21/2022    COVID 19 Nurse Triage Plan/Patient Instructions    Please be aware that novel coronavirus (COVID-19) may be circulating in the community. If you develop symptoms such as fever, cough, or SOB or if you have concerns about the presence of another infection including coronavirus (COVID-19), please contact your health care provider or visit [email or web address].     Disposition/Instructions    In-Person Visit with provider recommended. Reference Visit Selection Guide.    Thank you for taking steps to prevent the spread of this virus.  o Limit your contact with others.  o Wear a simple mask to cover your cough.  o Wash your hands well and often.    Resources    [facility]: About COVID-19: [email or web address]    CDC: What to Do If You're Sick: www.cdc.gov/coronavirus/2019-ncov/about/steps-when-sick.html    CDC: Ending Home Isolation: www.cdc.gov/coronavirus/2019-ncov/hcp/disposition-in-home-patients.html     CDC: Caring for Someone: www.cdc.gov/coronavirus/2019-ncov/if-you-are-sick/care-for-someone.html     [facility]: Interim Guidance for Hospital Discharge to Home: www.health.[facility].mn.us/diseases/coronavirus/hcp/hospdischarge.pdf    [facility] clinical trials (COVID-19 research studies): clinicalaffairs.[facility]/umn-clinical-trials     Below are the COVID-19 hotlines at the [place] Department of Health ([facility]). Interpreters are available.   o For health questions: Call 703-826-6808 or 1-409.362.3808 (7 a.m. to 7  p.m.)  o For questions about schools and childcare: Call 832-734-4643 or 1-762.529.6858 (7 a.m. to 7 p.m.)     Reason for Disposition    Patient wants to be seen    Protocols used: RASH OR REDNESS - [id number]

## 2022-02-21 NOTE — LETTER
February 22, 2022      Alice VAN Fede  [address]  [facility] [address]        Dear ,    We are writing to inform you of your test results.    All very good       Resulted Orders   Amylase   Result Value Ref Range    Amylase 49 5 - 120 U/L   CBC with platelets and differential   Result Value Ref Range    WBC Count 6.2 4.0 - 11.0 10e3/uL    RBC Count 4.47 3.80 - 5.20 10e6/uL    Hemoglobin 13.5 11.7 - 15.7 g/dL    Hematocrit 39.7 35.0 - 47.0 %    MCV 89 78 - 100 fL    MCH 30.2 26.5 - 33.0 pg    MCHC 34.0 31.5 - 36.5 g/dL    RDW 12.9 10.0 - 15.0 %    Platelet Count 223 150 - 450 10e3/uL    % Neutrophils 67 %    % Lymphocytes 23 %    % Monocytes 7 %    % Eosinophils 3 %    % Basophils 1 %    % Immature Granulocytes 0 %    Absolute Neutrophils 4.2 1.6 - 8.3 10e3/uL    Absolute Lymphocytes 1.4 0.8 - 5.3 10e3/uL    Absolute Monocytes 0.5 0.0 - 1.3 10e3/uL    Absolute Eosinophils 0.2 0.0 - 0.7 10e3/uL    Absolute Basophils 0.0 0.0 - 0.2 10e3/uL    Absolute Immature Granulocytes 0.0 <=0.4 10e3/uL       If you have any questions or concerns, please call the clinic at the number listed above.       Sincerely,      NARA Carwford CNP

## 2022-02-22 LAB — ESTRADIOL SERPL-MCNC: <10 PG/ML

## 2022-03-02 ENCOUNTER — NURSE TRIAGE (OUTPATIENT)
Dept: NURSING | Facility: CLINIC | Age: 47
End: 2022-03-02
Payer: COMMERCIAL

## 2022-03-02 NOTE — TELEPHONE ENCOUNTER
Finished Augmentin 875mg BID x 7 days  For a Sialadenitis.  She is calling today to report she has a rash all over her body.after finishing this antibiotic.Slightly itchy.    Her  Sialadenitis       Patient advised ok to take Benadryl for itching. Patient advised to call back if rash worsens or other symptoms occur.    Patient advised she may have allergy to Augmentin.   Does Dr. Bravo want to see patient for this., or should she go to  today.    [facility] for Dr. Bravo..    Kathy Abdalla RN   [facility] Nurse Advisor      Additional Information    Rash while taking a medication or within 3 days of stopping it    Hives or itching    Negative: Pregnant    Negative: Rash beginning within 4 hours of a new prescription medication    Negative: Fever    Negative: Face or lip swelling    Negative: Purple or blood-colored spots or dots (no fever and sounds well to triager)    Negative: Joint pain or swelling    Negative: Bloody crusts on lips or in mouth    Negative: Large or small blisters on skin (i.e., fluid filled bubbles or sacs)    Negative: Widespread hives and onset < 2 hours of exposure to 1st dose of drug    Negative: Patient sounds very sick or weak to the triager    Taking new prescription antibiotic (Exception: finished taking new prescription antibiotic)    Protocols used: MEDICATION QUESTION CALL-A-OH, RASH - WIDESPREAD WHILE ON DRUGS-A-OH

## 2022-03-02 NOTE — TELEPHONE ENCOUNTER
Left detailed voice mail relaying Cindy Michael's note.     If pt does end up calling back, please advise pt of Cindy Michael's note again    Jesus Bella Jr., CMA on 3/2/2022 at 3:35 PM

## 2022-03-04 ENCOUNTER — TELEPHONE (OUTPATIENT)
Dept: ONCOLOGY | Facility: HOSPITAL | Age: 47
End: 2022-03-04
Payer: COMMERCIAL

## 2022-03-04 NOTE — TELEPHONE ENCOUNTER
Patient calls in today stating that 2 days ago she found a pea-sized lump in her left axilla.  She states that she has not had chemotherapy for about a year and she most recently saw Kaela Plunkett CNP on 2/7/2022.  She states that she has still been kind of leery to be doing self exams in the areas of her bilateral mastectomies.  It never really occurred to her until she had a primary care appointment last week that she should also be checking her armpits.  She states that 2 days ago she found a small pea-sized lump and is not sure if she should be concerned about it.  She does tell me that she had some swollen lymph nodes in her neck which she saw a provider at her PCP office.  She was put on an antibiotic for this and did react to the antibiotic a day before she completed it.  She ended up having a rash so she is not sure if this is related to that at all.  I did tell her to monitor this over the weekend and give us a call back early next week if it does not seem to be subsiding.  We can always get her in for an assessment of this area with one of our nurse practitioners.  Patient states that she likes this plan and is comfortable with it.  She will give us a call back if this does not seem to subside.    Georgie Mejia RN

## 2022-06-06 DIAGNOSIS — Z17.1 MALIGNANT NEOPLASM OF LOWER-INNER QUADRANT OF RIGHT BREAST OF FEMALE, ESTROGEN RECEPTOR NEGATIVE (H): Primary | ICD-10-CM

## 2022-06-06 DIAGNOSIS — C50.311 MALIGNANT NEOPLASM OF LOWER-INNER QUADRANT OF RIGHT BREAST OF FEMALE, ESTROGEN RECEPTOR NEGATIVE (H): Primary | ICD-10-CM

## 2022-06-07 DIAGNOSIS — Z17.1 MALIGNANT NEOPLASM OF LOWER-INNER QUADRANT OF RIGHT BREAST OF FEMALE, ESTROGEN RECEPTOR NEGATIVE (H): Primary | ICD-10-CM

## 2022-06-07 DIAGNOSIS — C50.311 MALIGNANT NEOPLASM OF LOWER-INNER QUADRANT OF RIGHT BREAST OF FEMALE, ESTROGEN RECEPTOR NEGATIVE (H): Primary | ICD-10-CM

## 2022-06-08 ENCOUNTER — ONCOLOGY VISIT (OUTPATIENT)
Dept: ONCOLOGY | Facility: HOSPITAL | Age: 47
End: 2022-06-08
Attending: INTERNAL MEDICINE
Payer: COMMERCIAL

## 2022-06-08 ENCOUNTER — LAB (OUTPATIENT)
Dept: INFUSION THERAPY | Facility: HOSPITAL | Age: 47
End: 2022-06-08
Attending: NURSE PRACTITIONER
Payer: COMMERCIAL

## 2022-06-08 VITALS
BODY MASS INDEX: 23.03 KG/M2 | WEIGHT: 133.1 LBS | SYSTOLIC BLOOD PRESSURE: 145 MMHG | TEMPERATURE: 98.2 F | RESPIRATION RATE: 18 BRPM | DIASTOLIC BLOOD PRESSURE: 75 MMHG | HEART RATE: 88 BPM | OXYGEN SATURATION: 95 %

## 2022-06-08 DIAGNOSIS — Z17.1 MALIGNANT NEOPLASM OF LOWER-INNER QUADRANT OF RIGHT BREAST OF FEMALE, ESTROGEN RECEPTOR NEGATIVE (H): ICD-10-CM

## 2022-06-08 DIAGNOSIS — C50.311 MALIGNANT NEOPLASM OF LOWER-INNER QUADRANT OF RIGHT BREAST OF FEMALE, ESTROGEN RECEPTOR NEGATIVE (H): Primary | ICD-10-CM

## 2022-06-08 DIAGNOSIS — C50.311 MALIGNANT NEOPLASM OF LOWER-INNER QUADRANT OF RIGHT BREAST OF FEMALE, ESTROGEN RECEPTOR NEGATIVE (H): ICD-10-CM

## 2022-06-08 DIAGNOSIS — Z17.1 MALIGNANT NEOPLASM OF LOWER-INNER QUADRANT OF RIGHT BREAST OF FEMALE, ESTROGEN RECEPTOR NEGATIVE (H): Primary | ICD-10-CM

## 2022-06-08 LAB
ALBUMIN SERPL-MCNC: 4.4 G/DL (ref 3.5–5)
ALP SERPL-CCNC: 108 U/L (ref 45–120)
ALT SERPL W P-5'-P-CCNC: 56 U/L (ref 0–45)
ANION GAP SERPL CALCULATED.3IONS-SCNC: 16 MMOL/L (ref 5–18)
AST SERPL W P-5'-P-CCNC: 43 U/L (ref 0–40)
BASOPHILS # BLD AUTO: 0.1 10E3/UL (ref 0–0.2)
BASOPHILS NFR BLD AUTO: 1 %
BILIRUB SERPL-MCNC: 0.3 MG/DL (ref 0–1)
BUN SERPL-MCNC: 16 MG/DL (ref 8–22)
CALCIUM SERPL-MCNC: 10.2 MG/DL (ref 8.5–10.5)
CHLORIDE BLD-SCNC: 97 MMOL/L (ref 98–107)
CO2 SERPL-SCNC: 27 MMOL/L (ref 22–31)
CREAT SERPL-MCNC: 0.82 MG/DL (ref 0.6–1.1)
EOSINOPHIL # BLD AUTO: 0.1 10E3/UL (ref 0–0.7)
EOSINOPHIL NFR BLD AUTO: 2 %
ERYTHROCYTE [DISTWIDTH] IN BLOOD BY AUTOMATED COUNT: 12.9 % (ref 10–15)
GFR SERPL CREATININE-BSD FRML MDRD: 88 ML/MIN/1.73M2
GLUCOSE BLD-MCNC: 177 MG/DL (ref 70–125)
HCT VFR BLD AUTO: 44.6 % (ref 35–47)
HGB BLD-MCNC: 15.6 G/DL (ref 11.7–15.7)
IMM GRANULOCYTES # BLD: 0 10E3/UL
IMM GRANULOCYTES NFR BLD: 1 %
LYMPHOCYTES # BLD AUTO: 1.4 10E3/UL (ref 0.8–5.3)
LYMPHOCYTES NFR BLD AUTO: 25 %
MCH RBC QN AUTO: 31.5 PG (ref 26.5–33)
MCHC RBC AUTO-ENTMCNC: 35 G/DL (ref 31.5–36.5)
MCV RBC AUTO: 90 FL (ref 78–100)
MONOCYTES # BLD AUTO: 0.3 10E3/UL (ref 0–1.3)
MONOCYTES NFR BLD AUTO: 6 %
NEUTROPHILS # BLD AUTO: 3.6 10E3/UL (ref 1.6–8.3)
NEUTROPHILS NFR BLD AUTO: 65 %
NRBC # BLD AUTO: 0 10E3/UL
NRBC BLD AUTO-RTO: 0 /100
PLATELET # BLD AUTO: 246 10E3/UL (ref 150–450)
POTASSIUM BLD-SCNC: 3.4 MMOL/L (ref 3.5–5)
PROT SERPL-MCNC: 8.3 G/DL (ref 6–8)
RBC # BLD AUTO: 4.95 10E6/UL (ref 3.8–5.2)
SODIUM SERPL-SCNC: 140 MMOL/L (ref 136–145)
WBC # BLD AUTO: 5.4 10E3/UL (ref 4–11)

## 2022-06-08 PROCEDURE — 80053 COMPREHEN METABOLIC PANEL: CPT

## 2022-06-08 PROCEDURE — 82670 ASSAY OF TOTAL ESTRADIOL: CPT

## 2022-06-08 PROCEDURE — 99213 OFFICE O/P EST LOW 20 MIN: CPT | Performed by: INTERNAL MEDICINE

## 2022-06-08 PROCEDURE — 85025 COMPLETE CBC W/AUTO DIFF WBC: CPT

## 2022-06-08 PROCEDURE — G0463 HOSPITAL OUTPT CLINIC VISIT: HCPCS

## 2022-06-08 PROCEDURE — 83001 ASSAY OF GONADOTROPIN (FSH): CPT

## 2022-06-08 PROCEDURE — 36415 COLL VENOUS BLD VENIPUNCTURE: CPT

## 2022-06-08 PROCEDURE — 83002 ASSAY OF GONADOTROPIN (LH): CPT

## 2022-06-08 ASSESSMENT — PAIN SCALES - GENERAL: PAINLEVEL: NO PAIN (0)

## 2022-06-08 NOTE — LETTER
"    6/8/2022         RE: Jes Mistry  [id number] [facility] [address]        Dear Colleague,    Thank you for referring your patient, Jes Mistry, to the [facility]. Please see a copy of my visit note below.    Oncology Rooming Note    June 8, 2022 1:59 PM   Jes Mistry is a 47 year old female who presents for:    Chief Complaint   Patient presents with     Oncology Clinic Visit     Malignant neoplasm of lower-inner quadrant of right breast of female, estrogen receptor negative (H)     Initial Vitals: BP (!) 145/75   Pulse 88   Temp 98.2  F (36.8  C)   Resp 18   Wt 60.4 kg (133 lb 1.6 oz)   SpO2 95%   BMI 23.03 kg/m   Estimated body mass index is 23.03 kg/m  as calculated from the following:    Height as of 2/21/22: 1.619 m (5' 3.75\").    Weight as of this encounter: 60.4 kg (133 lb 1.6 oz). Body surface area is 1.65 meters squared.  No Pain (0) Comment: Data Unavailable   No LMP recorded. (Menstrual status: Chemotherapy).  Allergies reviewed: Yes  Medications reviewed: Yes    Medications: Medication refills not needed today.  Pharmacy name entered into [employer]:    [facility]/PHARMACY #[id number] - [address]  [facility] [place], [place] - [address] [place] [address]    Clinical concerns: None       Priscila Balderas LPN              [facility] Hematology and Oncology Progress Note    Patient: Jes Mistry  MRN: [id number]  Date of Service: Jun 8, 2022        Assessment and Plan:    Cancer Staging  Malignant neoplasm of lower-inner quadrant of right breast of female, estrogen receptor negative (H)  Staging form: Breast, AJCC 8th Edition  - Clinical: Stage IIB (cT2, cN0, cM0, G3, ER-, AK-, HER2-) - Signed by Kaela Plunkett, CNP on 10/9/2020     1.  Invasive ductal carcinoma: Continues on single agent tamoxifen.  Tolerating well.  This will be continued until April 2026.  She had bilateral mastectomy.  We " will see her back in about 6 months.  She will let us know in the interim if she develops any new symptoms.    ECOG Performance  0    Diagnosis:    1.  Carcinoma of the right breast: High-grade.  6 o'clock position.  Diagnosed September 2020. Estrogen receptor positive in 1%.  [place] and HER-2 negative.  Measured 24 x 13 x 19 mm on ultrasound. Ultrasound of the axilla was negative. 2 sentinel nodes are negative.    Treatment:    Neoadjuvant chemotherapy with Adriamycin and Cytoxan started October 9, 2020.  Weekly Taxol started December 3, 2020.     Bilateral mastectomy performed March 24, 2021: Final pathology shows residual invasive ductal carcinoma, grade 2, 6 x 6 x 4 mm.  Negative margins.  ER positive at 30%.  HER-2/emily negative by FISH.  Adjuvant Xeloda was not offered as her residual disease was estrogen receptor positive.    Tamoxifen initiated April 2021.    Interim History:    Alice is here today for a follow-up visit.  She notes that she has mild hot flashes. No shortness of breath or fluid retention.  No acute complaints today.    Review of Systems:    As above in the history.     Review of Systems otherwise Negative for:  General: chills, fever or night sweats  Psychological: anxiety or depression  Ophthalmic: blurry vision, double vision or loss of vision, vision change  ENT: epistaxis, oral lesions, hearing changes  Hematological and Lymphatic: bleeding, bruising, jaundice, swollen lymph nodes  Endocrine: unexpected weight changes  Respiratory: cough, hemoptysis, orthopnea or shortness of breath/ACHARYA  Cardiovascular: chest pain, edema, palpitations or PND  Gastrointestinal: abdominal pain, blood in stools, change in bowel habits, constipation, diarrhea or nausea/vomiting  Genito-Urinary: change in urinary stream, incontinence, frequency/urgency  Musculoskeletal: joint pain, stiffness, swelling, muscle pain  Neurological: dizziness, headaches, numbness/tingling  Dermatological: lumps and rash    Past  History:    Past Medical History:   Diagnosis Date     Complication of anesthesia     slow to wake     History of anesthesia complications     Slow to wake up     HTN (hypertension)      Nonsmoker      Thyroid nodule 2011     Physical Exam:    BP (!) 145/75   Pulse 88   Temp 98.2  F (36.8  C)   Resp 18   Wt 60.4 kg (133 lb 1.6 oz)   SpO2 95%   BMI 23.03 kg/m      General: patient appears stated age of 46 year old. Nontoxic and in no distress.   HEENT: Head: atraumatic, normocephalic. Sclerae anicteric.  Chest:  Normal respiratory effort  Cardiac:  No edema.   Abdomen: abdomen is non-distended  Extremities: normal tone and muscle bulk.  Skin: no lesions or rash on visible skin. Warm and dry.   CNS: alert and oriented. Grossly non-focal.   Psychiatric: normal mood and affect.     Lab Results:    No results found for this or any previous visit (from the past 168 hour(s)).     Imaging:    No results found.      Signed by: Elia Olsen MD        Again, thank you for allowing me to participate in the care of your patient.        Sincerely,        Elia Olsen MD

## 2022-06-08 NOTE — PROGRESS NOTES
"Oncology Rooming Note    June 8, 2022 1:59 PM   Jes Mistry is a 47 year old female who presents for:    Chief Complaint   Patient presents with     Oncology Clinic Visit     Malignant neoplasm of lower-inner quadrant of right breast of female, estrogen receptor negative (H)     Initial Vitals: BP (!) 145/75   Pulse 88   Temp 98.2  F (36.8  C)   Resp 18   Wt 60.4 kg (133 lb 1.6 oz)   SpO2 95%   BMI 23.03 kg/m   Estimated body mass index is 23.03 kg/m  as calculated from the following:    Height as of 2/21/22: 1.619 m (5' 3.75\").    Weight as of this encounter: 60.4 kg (133 lb 1.6 oz). Body surface area is 1.65 meters squared.  No Pain (0) Comment: Data Unavailable   No LMP recorded. (Menstrual status: Chemotherapy).  Allergies reviewed: Yes  Medications reviewed: Yes    Medications: Medication refills not needed today.  Pharmacy name entered into [employer]:    [facility]/PHARMACY #[id number] - [place], [place] - [address] [place] [address] E  [facility] [place], [place] - [address] [place] [address]    Clinical concerns: None       Priscila Balderas LPN            "

## 2022-06-09 LAB
ESTRADIOL SERPL-MCNC: <10 PG/ML
FSH SERPL-ACNC: 27.5 MIU/ML
LH SERPL-ACNC: 11.7 MIU/ML

## 2022-07-02 NOTE — PROGRESS NOTES
[facility] Hematology and Oncology Progress Note    Patient: Jes Mistry  MRN: [id number]  Date of Service: Jun 8, 2022        Assessment and Plan:    Cancer Staging  Malignant neoplasm of lower-inner quadrant of right breast of female, estrogen receptor negative (H)  Staging form: Breast, AJCC 8th Edition  - Clinical: Stage IIB (cT2, cN0, cM0, G3, ER-, HI-, HER2-) - Signed by Kaela Plunkett CNP on 10/9/2020     1.  Invasive ductal carcinoma: Continues on single agent tamoxifen.  Tolerating well.  This will be continued until April 2026.  She had bilateral mastectomy.  We will see her back in about 6 months.  She will let us know in the interim if she develops any new symptoms.    ECOG Performance  0    Diagnosis:    1.  Carcinoma of the right breast: High-grade.  6 o'clock position.  Diagnosed September 2020. Estrogen receptor positive in 1%.  [place] and HER-2 negative.  Measured 24 x 13 x 19 mm on ultrasound. Ultrasound of the axilla was negative. 2 sentinel nodes are negative.    Treatment:    Neoadjuvant chemotherapy with Adriamycin and Cytoxan started October 9, 2020.  Weekly Taxol started December 3, 2020.     Bilateral mastectomy performed March 24, 2021: Final pathology shows residual invasive ductal carcinoma, grade 2, 6 x 6 x 4 mm.  Negative margins.  ER positive at 30%.  HER-2/emily negative by FISH.  Adjuvant Xeloda was not offered as her residual disease was estrogen receptor positive.    Tamoxifen initiated April 2021.    Interim History:    Alice is here today for a follow-up visit.  She notes that she has mild hot flashes. No shortness of breath or fluid retention.  No acute complaints today.    Review of Systems:    As above in the history.     Review of Systems otherwise Negative for:  General: chills, fever or night sweats  Psychological: anxiety or depression  Ophthalmic: blurry vision, double vision or loss of vision, vision change  ENT: epistaxis, oral lesions, hearing  changes  Hematological and Lymphatic: bleeding, bruising, jaundice, swollen lymph nodes  Endocrine: unexpected weight changes  Respiratory: cough, hemoptysis, orthopnea or shortness of breath/ACHARYA  Cardiovascular: chest pain, edema, palpitations or PND  Gastrointestinal: abdominal pain, blood in stools, change in bowel habits, constipation, diarrhea or nausea/vomiting  Genito-Urinary: change in urinary stream, incontinence, frequency/urgency  Musculoskeletal: joint pain, stiffness, swelling, muscle pain  Neurological: dizziness, headaches, numbness/tingling  Dermatological: lumps and rash    Past History:    Past Medical History:   Diagnosis Date     Complication of anesthesia     slow to wake     History of anesthesia complications     Slow to wake up     HTN (hypertension)      Nonsmoker      Thyroid nodule 2011     Physical Exam:    BP (!) 145/75   Pulse 88   Temp 98.2  F (36.8  C)   Resp 18   Wt 60.4 kg (133 lb 1.6 oz)   SpO2 95%   BMI 23.03 kg/m      General: patient appears stated age of 46 year old. Nontoxic and in no distress.   HEENT: Head: atraumatic, normocephalic. Sclerae anicteric.  Chest:  Normal respiratory effort  Cardiac:  No edema.   Abdomen: abdomen is non-distended  Extremities: normal tone and muscle bulk.  Skin: no lesions or rash on visible skin. Warm and dry.   CNS: alert and oriented. Grossly non-focal.   Psychiatric: normal mood and affect.     Lab Results:    No results found for this or any previous visit (from the past 168 hour(s)).     Imaging:    No results found.      Signed by: Elia Olsen MD

## 2022-09-11 ENCOUNTER — TRANSFERRED RECORDS (OUTPATIENT)
Dept: HEALTH INFORMATION MANAGEMENT | Facility: CLINIC | Age: 47
End: 2022-09-11

## 2022-09-25 ENCOUNTER — HEALTH MAINTENANCE LETTER (OUTPATIENT)
Age: 47
End: 2022-09-25

## 2022-11-08 ASSESSMENT — ENCOUNTER SYMPTOMS
NERVOUS/ANXIOUS: 1
CHILLS: 0
HEADACHES: 0
HEMATURIA: 0
NAUSEA: 0
DIARRHEA: 0
CONSTIPATION: 0
HEMATOCHEZIA: 0
PARESTHESIAS: 1
FREQUENCY: 1
EYE PAIN: 0
PALPITATIONS: 0
SORE THROAT: 0
DYSURIA: 0
JOINT SWELLING: 1
BREAST MASS: 0
FEVER: 0
MYALGIAS: 1
DIZZINESS: 1
WEAKNESS: 0
HEARTBURN: 0
COUGH: 1
SHORTNESS OF BREATH: 0
ABDOMINAL PAIN: 1
ARTHRALGIAS: 1

## 2022-11-09 ENCOUNTER — HOSPITAL ENCOUNTER (OUTPATIENT)
Dept: ULTRASOUND IMAGING | Facility: HOSPITAL | Age: 47
Discharge: HOME | End: 2022-11-09
Attending: NURSE PRACTITIONER | Admitting: NURSE PRACTITIONER
Payer: COMMERCIAL

## 2022-11-09 ENCOUNTER — OFFICE VISIT (OUTPATIENT)
Dept: INTERNAL MEDICINE | Facility: CLINIC | Age: 47
End: 2022-11-09
Payer: COMMERCIAL

## 2022-11-09 VITALS
HEART RATE: 75 BPM | DIASTOLIC BLOOD PRESSURE: 70 MMHG | BODY MASS INDEX: 22.16 KG/M2 | WEIGHT: 129.8 LBS | OXYGEN SATURATION: 100 % | SYSTOLIC BLOOD PRESSURE: 120 MMHG | HEIGHT: 64 IN

## 2022-11-09 DIAGNOSIS — N80.03 ADENOMYOSIS: ICD-10-CM

## 2022-11-09 DIAGNOSIS — R10.2 PELVIC PAIN: ICD-10-CM

## 2022-11-09 DIAGNOSIS — I10 ESSENTIAL HYPERTENSION: ICD-10-CM

## 2022-11-09 DIAGNOSIS — Z00.00 ENCOUNTER FOR ANNUAL PHYSICAL EXAM: Primary | ICD-10-CM

## 2022-11-09 DIAGNOSIS — R73.9 ELEVATED BLOOD SUGAR: ICD-10-CM

## 2022-11-09 DIAGNOSIS — R79.89 LOW TSH LEVEL: ICD-10-CM

## 2022-11-09 DIAGNOSIS — R00.2 FLUTTERING SENSATION OF HEART: ICD-10-CM

## 2022-11-09 DIAGNOSIS — Z12.11 SCREEN FOR COLON CANCER: ICD-10-CM

## 2022-11-09 DIAGNOSIS — Z17.1 MALIGNANT NEOPLASM OF LOWER-INNER QUADRANT OF RIGHT BREAST OF FEMALE, ESTROGEN RECEPTOR NEGATIVE (H): ICD-10-CM

## 2022-11-09 DIAGNOSIS — D25.9 UTERINE LEIOMYOMA, UNSPECIFIED LOCATION: ICD-10-CM

## 2022-11-09 DIAGNOSIS — C50.311 MALIGNANT NEOPLASM OF LOWER-INNER QUADRANT OF RIGHT BREAST OF FEMALE, ESTROGEN RECEPTOR NEGATIVE (H): ICD-10-CM

## 2022-11-09 DIAGNOSIS — E78.1 HYPERTRIGLYCERIDEMIA: ICD-10-CM

## 2022-11-09 LAB
ALBUMIN SERPL BCG-MCNC: 4.5 G/DL (ref 3.5–5.2)
ALBUMIN UR-MCNC: NEGATIVE MG/DL
ALP SERPL-CCNC: 63 U/L (ref 35–104)
ALT SERPL W P-5'-P-CCNC: 39 U/L (ref 10–35)
ANION GAP SERPL CALCULATED.3IONS-SCNC: 15 MMOL/L (ref 7–15)
APPEARANCE UR: CLEAR
AST SERPL W P-5'-P-CCNC: 38 U/L (ref 10–35)
ATRIAL RATE - MUSE: 69 BPM
BACTERIA #/AREA URNS HPF: ABNORMAL /HPF
BASOPHILS # BLD AUTO: 0 10E3/UL (ref 0–0.2)
BASOPHILS NFR BLD AUTO: 1 %
BILIRUB SERPL-MCNC: 0.3 MG/DL
BILIRUB UR QL STRIP: NEGATIVE
BUN SERPL-MCNC: 14.6 MG/DL (ref 6–20)
CALCIUM SERPL-MCNC: 9.7 MG/DL (ref 8.6–10)
CHLORIDE SERPL-SCNC: 98 MMOL/L (ref 98–107)
CHOLEST SERPL-MCNC: 196 MG/DL
COLOR UR AUTO: YELLOW
CREAT SERPL-MCNC: 0.67 MG/DL (ref 0.51–0.95)
DEPRECATED HCO3 PLAS-SCNC: 24 MMOL/L (ref 22–29)
DIASTOLIC BLOOD PRESSURE - MUSE: NORMAL MMHG
EOSINOPHIL # BLD AUTO: 0.1 10E3/UL (ref 0–0.7)
EOSINOPHIL NFR BLD AUTO: 2 %
ERYTHROCYTE [DISTWIDTH] IN BLOOD BY AUTOMATED COUNT: 12.8 % (ref 10–15)
FERRITIN SERPL-MCNC: 356 NG/ML (ref 6–175)
GFR SERPL CREATININE-BSD FRML MDRD: >90 ML/MIN/1.73M2
GLUCOSE SERPL-MCNC: 95 MG/DL (ref 70–99)
GLUCOSE UR STRIP-MCNC: NEGATIVE MG/DL
HCT VFR BLD AUTO: 38.3 % (ref 35–47)
HDLC SERPL-MCNC: 24 MG/DL
HGB BLD-MCNC: 13.1 G/DL (ref 11.7–15.7)
HGB UR QL STRIP: NEGATIVE
IMM GRANULOCYTES # BLD: 0 10E3/UL
IMM GRANULOCYTES NFR BLD: 0 %
INTERPRETATION ECG - MUSE: NORMAL
IRON BINDING CAPACITY (ROCHE): 304 UG/DL (ref 240–430)
IRON SATN MFR SERPL: 24 % (ref 15–46)
IRON SERPL-MCNC: 74 UG/DL (ref 37–145)
KETONES UR STRIP-MCNC: NEGATIVE MG/DL
LDLC SERPL CALC-MCNC: ABNORMAL MG/DL
LEUKOCYTE ESTERASE UR QL STRIP: ABNORMAL
LYMPHOCYTES # BLD AUTO: 1.3 10E3/UL (ref 0.8–5.3)
LYMPHOCYTES NFR BLD AUTO: 26 %
MAGNESIUM SERPL-MCNC: 2 MG/DL (ref 1.7–2.3)
MCH RBC QN AUTO: 30.3 PG (ref 26.5–33)
MCHC RBC AUTO-ENTMCNC: 34.2 G/DL (ref 31.5–36.5)
MCV RBC AUTO: 89 FL (ref 78–100)
MONOCYTES # BLD AUTO: 0.4 10E3/UL (ref 0–1.3)
MONOCYTES NFR BLD AUTO: 7 %
NEUTROPHILS # BLD AUTO: 3.3 10E3/UL (ref 1.6–8.3)
NEUTROPHILS NFR BLD AUTO: 65 %
NITRATE UR QL: NEGATIVE
NONHDLC SERPL-MCNC: 172 MG/DL
P AXIS - MUSE: 68 DEGREES
PH UR STRIP: 5.5 [PH] (ref 5–8)
PLATELET # BLD AUTO: 199 10E3/UL (ref 150–450)
POTASSIUM SERPL-SCNC: 3.8 MMOL/L (ref 3.4–5.3)
PR INTERVAL - MUSE: 148 MS
PROT SERPL-MCNC: 7.1 G/DL (ref 6.4–8.3)
QRS DURATION - MUSE: 88 MS
QT - MUSE: 412 MS
QTC - MUSE: 441 MS
R AXIS - MUSE: 55 DEGREES
RBC # BLD AUTO: 4.33 10E6/UL (ref 3.8–5.2)
RBC #/AREA URNS AUTO: ABNORMAL /HPF
SODIUM SERPL-SCNC: 137 MMOL/L (ref 136–145)
SP GR UR STRIP: >=1.03 (ref 1–1.03)
SQUAMOUS #/AREA URNS AUTO: ABNORMAL /LPF
SYSTOLIC BLOOD PRESSURE - MUSE: NORMAL MMHG
T AXIS - MUSE: 49 DEGREES
TRIGL SERPL-MCNC: 742 MG/DL
TSH SERPL DL<=0.005 MIU/L-ACNC: 3.44 UIU/ML (ref 0.3–4.2)
UROBILINOGEN UR STRIP-ACNC: 0.2 E.U./DL
VENTRICULAR RATE- MUSE: 69 BPM
VIT B12 SERPL-MCNC: 548 PG/ML (ref 232–1245)
WBC # BLD AUTO: 5.2 10E3/UL (ref 4–11)
WBC #/AREA URNS AUTO: ABNORMAL /HPF

## 2022-11-09 PROCEDURE — 82728 ASSAY OF FERRITIN: CPT | Performed by: NURSE PRACTITIONER

## 2022-11-09 PROCEDURE — 83550 IRON BINDING TEST: CPT | Performed by: NURSE PRACTITIONER

## 2022-11-09 PROCEDURE — 93010 ELECTROCARDIOGRAM REPORT: CPT | Performed by: GENERAL ACUTE CARE HOSPITAL

## 2022-11-09 PROCEDURE — 83540 ASSAY OF IRON: CPT | Performed by: NURSE PRACTITIONER

## 2022-11-09 PROCEDURE — 99213 OFFICE O/P EST LOW 20 MIN: CPT | Mod: 25 | Performed by: NURSE PRACTITIONER

## 2022-11-09 PROCEDURE — 99396 PREV VISIT EST AGE 40-64: CPT | Performed by: NURSE PRACTITIONER

## 2022-11-09 PROCEDURE — 81001 URINALYSIS AUTO W/SCOPE: CPT | Performed by: NURSE PRACTITIONER

## 2022-11-09 PROCEDURE — 36415 COLL VENOUS BLD VENIPUNCTURE: CPT | Performed by: NURSE PRACTITIONER

## 2022-11-09 PROCEDURE — 87086 URINE CULTURE/COLONY COUNT: CPT | Performed by: NURSE PRACTITIONER

## 2022-11-09 PROCEDURE — 80061 LIPID PANEL: CPT | Performed by: NURSE PRACTITIONER

## 2022-11-09 PROCEDURE — 80050 GENERAL HEALTH PANEL: CPT | Performed by: NURSE PRACTITIONER

## 2022-11-09 PROCEDURE — 83735 ASSAY OF MAGNESIUM: CPT | Performed by: NURSE PRACTITIONER

## 2022-11-09 PROCEDURE — 82607 VITAMIN B-12: CPT | Performed by: NURSE PRACTITIONER

## 2022-11-09 PROCEDURE — 93005 ELECTROCARDIOGRAM TRACING: CPT | Performed by: NURSE PRACTITIONER

## 2022-11-09 PROCEDURE — 76856 US EXAM PELVIC COMPLETE: CPT

## 2022-11-09 RX ORDER — LISINOPRIL 20 MG/1
20 TABLET ORAL DAILY
Qty: 90 TABLET | Refills: 3 | Status: SHIPPED | OUTPATIENT
Start: 2022-11-09 | End: 2023-11-10

## 2022-11-09 ASSESSMENT — ENCOUNTER SYMPTOMS
HEARTBURN: 0
DIZZINESS: 1
BREAST MASS: 0
SORE THROAT: 0
CHILLS: 0
FREQUENCY: 1
HEMATOCHEZIA: 0
EYE PAIN: 0
CONSTIPATION: 0
HEADACHES: 0
DIARRHEA: 0
COUGH: 1
PALPITATIONS: 0
WEAKNESS: 0
DYSURIA: 0
JOINT SWELLING: 1
SHORTNESS OF BREATH: 0
NERVOUS/ANXIOUS: 1
HEMATURIA: 0
PARESTHESIAS: 1
ABDOMINAL PAIN: 1
FEVER: 0
MYALGIAS: 1
NAUSEA: 0
ARTHRALGIAS: 1

## 2022-11-09 NOTE — PROGRESS NOTES
SUBJECTIVE:   CC: Alice is an 47 year old who presents for preventive health visit.     {  Patient has been advised of split billing requirements and indicates understanding: Yes  Healthy Habits:     Getting at least 3 servings of Calcium per day:  Yes    Bi-annual eye exam:  NO    Dental care twice a year:  Yes    Sleep apnea or symptoms of sleep apnea:  Daytime drowsiness    Diet:  Regular (no restrictions)    Frequency of exercise:  2-3 days/week    Duration of exercise:  Less than 15 minutes    Taking medications regularly:  Yes    Medication side effects:  Significant flushing    PHQ-2 Total Score: 1    Additional concerns today:  Yes              Today's PHQ-2 Score:   PHQ-2 ( 1999 [employer]) 11/8/2022   Q1: Little interest or pleasure in doing things 1   Q2: Feeling down, depressed or hopeless 0   PHQ-2 Score 1   Q1: Little interest or pleasure in doing things Several days   Q2: Feeling down, depressed or hopeless Not at all   PHQ-2 Score 1       Abuse: Current or Past (Physical, Sexual or Emotional) - No  Do you feel safe in your environment? Yes        Social History     Tobacco Use     Smoking status: Never     Smokeless tobacco: Never   Substance Use Topics     Alcohol use: No     If you drink alcohol do you typically have >3 drinks per day or >7 drinks per week? No    No flowsheet data found.    Reviewed orders with patient.  Reviewed health maintenance and updated orders accordingly - Yes  BP Readings from Last 3 Encounters:   11/09/22 120/70   06/08/22 (!) 145/75   02/21/22 116/70    Wt Readings from Last 3 Encounters:   11/09/22 58.9 kg (129 lb 12.8 oz)   06/08/22 60.4 kg (133 lb 1.6 oz)   02/21/22 58.1 kg (128 lb)                  Patient Active Problem List   Diagnosis     HTN (hypertension)     Nonsmoker     Malignant neoplasm of lower-inner quadrant of right breast of female, estrogen receptor negative (H)     Screening for cervical cancer     Neck mass     Past Surgical History:   Procedure  Laterality Date     ABDOMEN SURGERY        SECTION       EXCISE MASS NECK Left 2022    Procedure: EXCISION, MASS, NECK;  Surgeon: Elia Parson MD;  Location: [facility]     IA INSJ PRPH CTR VAD W/SUBQ PORT AGE 5 YR/> Left 10/08/2020    Procedure: Port Placement;  Surgeon: Kimberly Will MD;  Location: [facility];  Service: General     IA MASTECTOMY, SIMPLE, COMPLETE Bilateral 2021    Procedure: Bilateral Mastectomies; Right [place] Lymph Node Biopsy; Port Removal;  Surgeon: Kimberly Will MD;  Location: [facility];  Service: General     US BREAST CLIP PLACEMENT RIGHT Right 10/08/2020     US SENTINEL NODE INJECTION  2021       Social History     Tobacco Use     Smoking status: Never     Smokeless tobacco: Never   Substance Use Topics     Alcohol use: No     Family History   Problem Relation Age of Onset     Hypertension Mother      Hypertension Father      Cancer Maternal Grandmother 70.00        unknown-abdominal?     Cancer Maternal Grandfather 70.00        liver?         Current Outpatient Medications   Medication Sig Dispense Refill     Ascorbic Acid (VITAMIN C) 100 MG CHEW        lisinopril (ZESTRIL) 20 MG tablet Take 1 tablet (20 mg) by mouth daily 90 tablet 3     multivitamin w/minerals (THERA-VIT-M) tablet Take 1 tablet by mouth daily       tamoxifen (NOLVADEX) 20 MG tablet TAKE 1 TABLET BY MOUTH EVERY DAY 90 tablet 0     Allergies   Allergen Reactions     Betamethasone Dipropionate (Augmented) [Betamethasone]      Sulfa (Sulfonamide Antibiotics) [Sulfa Drugs] Unknown     Latex Itching     Recent Labs   Lab Test 22  1339 22  1659 22  0944 11/10/21  0937 10/15/21  0858 21  0917 21  0953 10/09/20  0840 20  0929 18  0901 16  0854   A1C  --   --   --  5.3  --   --   --   --   --   --   --    LDL  --   --   --   --   --   --   --   --  85  --  72   HDL  --   --   --   --   --   --   --   --  35*  --  33*    TRIG  --   --   --  2,029* 1,937*  --   --   --  347*  --  320*   ALT 56* 31  --   --  54* 32 46*   < >  --   --   --    CR 0.82 0.68   < >  --  0.72 0.74 0.72   < > 0.69   < >  --    GFRESTIMATED 88 >90   < >  --  >90 >60 >60   < > >60   < >  --    GFRESTBLACK  --   --   --   --   --  >60 >60   < > >60   < >  --    POTASSIUM 3.4* 3.9   < >  --  3.5 3.9 3.8   < > 4.2   < >  --    TSH  --   --   --   --  5.44*  --   --   --  <0.01*  --   --     < > = values in this interval not displayed.        Breast Cancer Screening:    Breast CA Risk Assessment (FHS-7) 11/10/2021   Do you have a family history of breast, colon, or ovarian cancer? No / Unknown         Mammogram Screening - Alternate mammogram schedule due to breast cancer history  Pertinent mammograms are reviewed under the imaging tab.    History of abnormal Pap smear:   Last 3 Pap and HPV Results:   PAP / HPV Latest Ref Rng & Units 11/10/2021   PAP   Negative for Intraepithelial Lesion or Malignancy (NILM)   HPV16 Negative Negative   HPV18 Negative Negative   HRHPV Negative Negative     PAP / HPV Latest Ref Rng & Units 11/10/2021   PAP   Negative for Intraepithelial Lesion or Malignancy (NILM)   HPV16 Negative Negative   HPV18 Negative Negative   HRHPV Negative Negative     Reviewed and updated as needed this visit by clinical staff   Tobacco  Allergies  Meds  Problems  Med Hx  Surg Hx  Fam Hx          Reviewed and updated as needed this visit by Provider   Tobacco  Allergies  Meds  Problems  Med Hx  Surg Hx  Fam Hx         Past Medical History:   Diagnosis Date     Complication of anesthesia     slow to wake     History of anesthesia complications     Slow to wake up     HTN (hypertension)      Nonsmoker      Thyroid nodule       Past Surgical History:   Procedure Laterality Date     ABDOMEN SURGERY        SECTION       EXCISE MASS NECK Left 2022    Procedure: EXCISION, MASS, NECK;  Surgeon: Elia Parson MD;   "Location: [facility]     [place] [facility] CTR VAD W/SUBQ PORT AGE 5 YR/> Left 10/08/2020    Procedure: Port Placement;  Surgeon: Kimberly Will MD;  Location: [facility];  Service: General     IL MASTECTOMY, SIMPLE, COMPLETE Bilateral 03/24/2021    Procedure: Bilateral Mastectomies; Right [place] Lymph Node Biopsy; Port Removal;  Surgeon: Kimberly Will MD;  Location: [facility];  Service: General     US BREAST CLIP PLACEMENT RIGHT Right 10/08/2020     US SENTINEL NODE INJECTION  03/24/2021     OB History   No obstetric history on file.       Review of Systems   Constitutional: Negative for chills and fever.   HENT: Negative for congestion, ear pain, hearing loss and sore throat.    Eyes: Positive for visual disturbance. Negative for pain.   Respiratory: Positive for cough. Negative for shortness of breath.    Cardiovascular: Negative for chest pain, palpitations and peripheral edema.   Gastrointestinal: Positive for abdominal pain. Negative for constipation, diarrhea, heartburn, hematochezia and nausea.   Breasts:  Negative for tenderness, breast mass and discharge.   Genitourinary: Positive for frequency and pelvic pain. Negative for dysuria, genital sores, hematuria, urgency, vaginal bleeding and vaginal discharge.   Musculoskeletal: Positive for arthralgias, joint swelling and myalgias.   Skin: Negative for rash.   Neurological: Positive for dizziness and paresthesias. Negative for weakness and headaches.   Psychiatric/Behavioral: Negative for mood changes. The patient is nervous/anxious.           OBJECTIVE:   /70 (BP Location: Right arm, Patient Position: Sitting, Cuff Size: Adult Regular)   Pulse 75   Ht 1.613 m (5' 3.5\")   Wt 58.9 kg (129 lb 12.8 oz)   SpO2 100%   BMI 22.63 kg/m    Physical Exam  GENERAL: healthy, alert and no distress  EYES: Eyes grossly normal to inspection, PERRL and conjunctivae and sclerae normal  HENT: ear canals and TM's normal, nose and mouth without " "ulcers or lesions  NECK: no adenopathy  RESP: lungs clear to auscultation - no rales, rhonchi or wheezes  CV: regular rate and rhythm, normal S1 S2,  no peripheral edema and peripheral pulses strong  ABDOMEN: soft, lower pelvic tenderness upon palpation, patient does have an umbilical hernia,, no hepatosplenomegaly, no masses and bowel sounds normal  MS: no gross musculoskeletal defects noted, no edema  SKIN: no suspicious lesions or rashes  NEURO: Normal strength and tone, mentation intact and speech normal  PSYCH: mentation appears normal, affect normal/bright  LYMPH: no cervical, supraclavicular adenopathy        ASSESSMENT/PLAN:     Problem List Items Addressed This Visit    None  Visit Diagnoses     Encounter for annual physical exam    -  Primary    Screen for colon cancer        Screening for HIV (human immunodeficiency virus)        Need for hepatitis C screening test        Fluttering sensation of heart        Relevant Orders    EKG 12-lead, tracing only (Completed)    Pelvic pain        Relevant Orders    US Pelvic Complete with Transvaginal        Patient to see ophthalmology for vision changes post chemotherapy     Patient has been advised of split billing requirements and indicates understanding: Yes      COUNSELING:  Reviewed preventive health counseling, as reflected in patient instructions       Regular exercise       Healthy diet/nutrition       Vision screening       Hearing screening    Estimated body mass index is 22.63 kg/m  as calculated from the following:    Height as of this encounter: 1.613 m (5' 3.5\").    Weight as of this encounter: 58.9 kg (129 lb 12.8 oz).    Weight management plan noted, stable and monitoring    She reports that she has never smoked. She has never used smokeless tobacco.      Counseling Resources:  ATP IV Guidelines  Pooled Cohorts Equation Calculator  Breast Cancer Risk Calculator  BRCA-Related Cancer Risk Assessment: FHS-7 Tool  FRAX Risk Assessment  ICSI Preventive " Guidelines  Dietary Guidelines for Americans, 2010  USDA's MyPlate  ASA Prophylaxis  Lung CA Screening    Cindy Michael NP  [facility]

## 2022-11-11 LAB — BACTERIA UR CULT: NO GROWTH

## 2022-11-15 ENCOUNTER — OFFICE VISIT (OUTPATIENT)
Dept: OBGYN | Facility: CLINIC | Age: 47
End: 2022-11-15
Payer: COMMERCIAL

## 2022-11-15 VITALS
DIASTOLIC BLOOD PRESSURE: 72 MMHG | WEIGHT: 130 LBS | OXYGEN SATURATION: 99 % | SYSTOLIC BLOOD PRESSURE: 148 MMHG | HEIGHT: 64 IN | HEART RATE: 89 BPM | BODY MASS INDEX: 22.2 KG/M2

## 2022-11-15 DIAGNOSIS — Z17.0 MALIGNANT NEOPLASM OF BREAST IN FEMALE, ESTROGEN RECEPTOR POSITIVE, UNSPECIFIED LATERALITY, UNSPECIFIED SITE OF BREAST (H): Primary | ICD-10-CM

## 2022-11-15 DIAGNOSIS — N80.03 ADENOMYOSIS: ICD-10-CM

## 2022-11-15 DIAGNOSIS — C50.919 MALIGNANT NEOPLASM OF BREAST IN FEMALE, ESTROGEN RECEPTOR POSITIVE, UNSPECIFIED LATERALITY, UNSPECIFIED SITE OF BREAST (H): Primary | ICD-10-CM

## 2022-11-15 DIAGNOSIS — D25.9 UTERINE LEIOMYOMA, UNSPECIFIED LOCATION: ICD-10-CM

## 2022-11-15 PROCEDURE — 99204 OFFICE O/P NEW MOD 45 MIN: CPT | Performed by: OBSTETRICS & GYNECOLOGY

## 2022-11-15 NOTE — PROGRESS NOTES
CC: Jes Mistry is here secondary to fibroids.    HPI: The pt is a 47 year old MWF  who presents with some pelvic discomfort recently that has improved now.  She states it felt like when her fibroids grew with pregnancy.  Her last period was in Oct of 2020; they stopped when she started chemo for her breast cancer, and didn't return after finishing chemo.  She had an ultrasound on 22 that showed multiple small intramural fibroids and subendometrial cystic changes.  There was a 4 mm smooth endometrium.  Both ovaries appeared normal.    Past Medical History:   Diagnosis Date     Breast cancer (H)     stage IIB, ER+, ME neg, HER2 neg     History of anesthesia complications     Slow to wake up     HTN (hypertension)      Nonsmoker      Thyroid nodule        Past Surgical History:   Procedure Laterality Date      SECTION  2010     EXCISE MASS NECK Left 2022    lipoma     ME INSJ PRPH CTR VAD W/SUBQ PORT AGE 5 YR/> Left 10/08/2020    Procedure: Port Placement;  Surgeon: Kimberly Will MD;  Location: [facility];  Service: General     ME MASTECTOMY, SIMPLE, COMPLETE Bilateral 2021    Procedure: Bilateral Mastectomies; Right [place] Lymph Node Biopsy; Port Removal;  Surgeon: Kimberly Will MD;  Location: [facility];  Service: General     US BREAST CLIP PLACEMENT RIGHT Right 10/08/2020     US SENTINEL NODE INJECTION  2021       Patient's   Family History   Problem Relation Age of Onset     Hypertension Mother      Hypertension Father      Cancer Maternal Grandmother 70        unknown-abdominal?     Cancer Maternal Grandfather 70        liver?       Patient   Social History     Socioeconomic History     Marital status:      Spouse name: None     Number of children: 1     Years of education: None     Highest education level: None   Tobacco Use     Smoking status: Never     Smokeless tobacco: Never   Substance and Sexual Activity     Alcohol use: No      "Drug use: No     Sexual activity: Not Currently     Social Determinants of Health     Financial Resource Strain: Low Risk      Difficulty of Paying Living Expenses: Not very hard   Food Insecurity: Food Insecurity Present     Worried About Running Out of Food in the Last Year: Sometimes true     Ran Out of Food in the Last Year: Never true   Transportation Needs: No Transportation Needs     Lack of Transportation (Medical): No     Lack of Transportation (Non-Medical): No       Outpatient Medications Prior to Visit   Medication Sig Dispense Refill     Ascorbic Acid (VITAMIN C) 100 MG CHEW        lisinopril (ZESTRIL) 20 MG tablet Take 1 tablet (20 mg) by mouth daily 90 tablet 3     multivitamin w/minerals (THERA-VIT-M) tablet Take 1 tablet by mouth daily       tamoxifen (NOLVADEX) 20 MG tablet TAKE 1 TABLET BY MOUTH EVERY DAY 90 tablet 0     No facility-administered medications prior to visit.       Patient is allergic to betamethasone dipropionate (augmented) [betamethasone], sulfa (sulfonamide antibiotics) [sulfa drugs], and latex.    ROS:  12 part ROS is negative aside from those symptoms in the HPI    PE:  BP (!) 148/72 (BP Location: Right arm, Patient Position: Sitting, Cuff Size: Adult Regular)   Pulse 89   Ht 1.613 m (5' 3.5\")   Wt 59 kg (130 lb)           Body mass index is 22.67 kg/m .    General: WN/WD WF, NAD  Psych: normal mood  Neuro: CN I-XII grossly intact  MS: normal gait    Assessment: 47 year old MWF  with fibroids, likely adenomyosis, and a history of breast cancer.    Plan: Natural history of fibroids and adenomyosis discussed with the patient.  We discussed that both of these are unlikely to be issues postmenopausally.  We discussed that they aren't related to breast cancer.  We discussed that they aren't related to uterine cancer.  We discussed that if she experiences vaginal bleeding she needs to be evaluated.  We discussed that she is due for a Pap smear in 2026.  Questions were " answered to the best of my ability.    48 minutes spent on the date of the encounter doing chart review, review of test results, interpretation of tests, patient visit and documentation

## 2022-11-16 DIAGNOSIS — Z17.1 MALIGNANT NEOPLASM OF LOWER-INNER QUADRANT OF RIGHT BREAST OF FEMALE, ESTROGEN RECEPTOR NEGATIVE (H): ICD-10-CM

## 2022-11-16 DIAGNOSIS — C50.311 MALIGNANT NEOPLASM OF LOWER-INNER QUADRANT OF RIGHT BREAST OF FEMALE, ESTROGEN RECEPTOR NEGATIVE (H): ICD-10-CM

## 2022-11-17 RX ORDER — TAMOXIFEN CITRATE 20 MG/1
TABLET ORAL
Qty: 90 TABLET | Refills: 0 | Status: SHIPPED | OUTPATIENT
Start: 2022-11-17 | End: 2022-12-08

## 2022-12-02 DIAGNOSIS — C50.311 MALIGNANT NEOPLASM OF LOWER-INNER QUADRANT OF RIGHT BREAST OF FEMALE, ESTROGEN RECEPTOR NEGATIVE (H): Primary | ICD-10-CM

## 2022-12-02 DIAGNOSIS — Z17.1 MALIGNANT NEOPLASM OF LOWER-INNER QUADRANT OF RIGHT BREAST OF FEMALE, ESTROGEN RECEPTOR NEGATIVE (H): Primary | ICD-10-CM

## 2022-12-08 ENCOUNTER — ONCOLOGY VISIT (OUTPATIENT)
Dept: ONCOLOGY | Facility: HOSPITAL | Age: 47
End: 2022-12-08
Payer: COMMERCIAL

## 2022-12-08 ENCOUNTER — LAB (OUTPATIENT)
Dept: INFUSION THERAPY | Facility: HOSPITAL | Age: 47
End: 2022-12-08
Payer: COMMERCIAL

## 2022-12-08 VITALS
DIASTOLIC BLOOD PRESSURE: 63 MMHG | HEART RATE: 70 BPM | BODY MASS INDEX: 23.07 KG/M2 | WEIGHT: 132.3 LBS | OXYGEN SATURATION: 97 % | SYSTOLIC BLOOD PRESSURE: 107 MMHG | RESPIRATION RATE: 16 BRPM | TEMPERATURE: 98.6 F

## 2022-12-08 DIAGNOSIS — C50.311 MALIGNANT NEOPLASM OF LOWER-INNER QUADRANT OF RIGHT BREAST OF FEMALE, ESTROGEN RECEPTOR NEGATIVE (H): ICD-10-CM

## 2022-12-08 DIAGNOSIS — Z17.1 MALIGNANT NEOPLASM OF LOWER-INNER QUADRANT OF RIGHT BREAST OF FEMALE, ESTROGEN RECEPTOR NEGATIVE (H): Primary | ICD-10-CM

## 2022-12-08 DIAGNOSIS — C50.311 MALIGNANT NEOPLASM OF LOWER-INNER QUADRANT OF RIGHT BREAST OF FEMALE, ESTROGEN RECEPTOR NEGATIVE (H): Primary | ICD-10-CM

## 2022-12-08 DIAGNOSIS — Z17.1 MALIGNANT NEOPLASM OF LOWER-INNER QUADRANT OF RIGHT BREAST OF FEMALE, ESTROGEN RECEPTOR NEGATIVE (H): ICD-10-CM

## 2022-12-08 LAB
ALBUMIN SERPL BCG-MCNC: 4.1 G/DL (ref 3.5–5.2)
ALP SERPL-CCNC: 81 U/L (ref 35–104)
ALT SERPL W P-5'-P-CCNC: 36 U/L (ref 10–35)
ANION GAP SERPL CALCULATED.3IONS-SCNC: 7 MMOL/L (ref 7–15)
AST SERPL W P-5'-P-CCNC: 29 U/L (ref 10–35)
BASOPHILS # BLD AUTO: 0 10E3/UL (ref 0–0.2)
BASOPHILS NFR BLD AUTO: 0 %
BILIRUB SERPL-MCNC: 0.2 MG/DL
BUN SERPL-MCNC: 10.8 MG/DL (ref 6–20)
CALCIUM SERPL-MCNC: 9 MG/DL (ref 8.6–10)
CHLORIDE SERPL-SCNC: 103 MMOL/L (ref 98–107)
CREAT SERPL-MCNC: 0.63 MG/DL (ref 0.51–0.95)
DEPRECATED HCO3 PLAS-SCNC: 29 MMOL/L (ref 22–29)
EOSINOPHIL # BLD AUTO: 0.1 10E3/UL (ref 0–0.7)
EOSINOPHIL NFR BLD AUTO: 2 %
ERYTHROCYTE [DISTWIDTH] IN BLOOD BY AUTOMATED COUNT: 12.9 % (ref 10–15)
GFR SERPL CREATININE-BSD FRML MDRD: >90 ML/MIN/1.73M2
GLUCOSE SERPL-MCNC: 113 MG/DL (ref 70–99)
HCT VFR BLD AUTO: 38.8 % (ref 35–47)
HGB BLD-MCNC: 13 G/DL (ref 11.7–15.7)
IMM GRANULOCYTES # BLD: 0 10E3/UL
IMM GRANULOCYTES NFR BLD: 0 %
LYMPHOCYTES # BLD AUTO: 1.3 10E3/UL (ref 0.8–5.3)
LYMPHOCYTES NFR BLD AUTO: 26 %
MCH RBC QN AUTO: 30.3 PG (ref 26.5–33)
MCHC RBC AUTO-ENTMCNC: 33.5 G/DL (ref 31.5–36.5)
MCV RBC AUTO: 90 FL (ref 78–100)
MONOCYTES # BLD AUTO: 0.4 10E3/UL (ref 0–1.3)
MONOCYTES NFR BLD AUTO: 8 %
NEUTROPHILS # BLD AUTO: 3.2 10E3/UL (ref 1.6–8.3)
NEUTROPHILS NFR BLD AUTO: 64 %
NRBC # BLD AUTO: 0 10E3/UL
NRBC BLD AUTO-RTO: 0 /100
PLATELET # BLD AUTO: 211 10E3/UL (ref 150–450)
POTASSIUM SERPL-SCNC: 3.8 MMOL/L (ref 3.4–5.3)
PROT SERPL-MCNC: 6.7 G/DL (ref 6.4–8.3)
RBC # BLD AUTO: 4.29 10E6/UL (ref 3.8–5.2)
SODIUM SERPL-SCNC: 139 MMOL/L (ref 136–145)
WBC # BLD AUTO: 5.1 10E3/UL (ref 4–11)

## 2022-12-08 PROCEDURE — 80053 COMPREHEN METABOLIC PANEL: CPT

## 2022-12-08 PROCEDURE — G0463 HOSPITAL OUTPT CLINIC VISIT: HCPCS

## 2022-12-08 PROCEDURE — 85025 COMPLETE CBC W/AUTO DIFF WBC: CPT

## 2022-12-08 PROCEDURE — 99213 OFFICE O/P EST LOW 20 MIN: CPT | Performed by: NURSE PRACTITIONER

## 2022-12-08 PROCEDURE — 36415 COLL VENOUS BLD VENIPUNCTURE: CPT

## 2022-12-08 RX ORDER — TAMOXIFEN CITRATE 20 MG/1
20 TABLET ORAL DAILY
Qty: 90 TABLET | Refills: 1 | Status: SHIPPED | OUTPATIENT
Start: 2022-12-08 | End: 2023-07-17

## 2022-12-08 ASSESSMENT — PAIN SCALES - GENERAL: PAINLEVEL: NO PAIN (0)

## 2022-12-08 NOTE — PROGRESS NOTES
"Oncology Rooming Note    December 8, 2022 1:43 PM   Jes Mistry is a 47 year old female who presents for:    Chief Complaint   Patient presents with     Oncology Clinic Visit     Malignant neoplasm of lower-inner quadrant of right breast of female, estrogen receptor negative (H)     Initial Vitals: /63   Pulse 70   Temp 98.6  F (37  C)   Resp 16   Wt 60 kg (132 lb 4.8 oz)   SpO2 97%   BMI 23.07 kg/m   Estimated body mass index is 23.07 kg/m  as calculated from the following:    Height as of 11/15/22: 1.613 m (5' 3.5\").    Weight as of this encounter: 60 kg (132 lb 4.8 oz). Body surface area is 1.64 meters squared.  No Pain (0) Comment: Data Unavailable   No LMP recorded. (Menstrual status: Chemotherapy).  Allergies reviewed: Yes  Medications reviewed: Yes    Medications: Medication refills not needed today.  Pharmacy name entered into [employer]:    [facility][id number] - [place], [place] - [address] [place] [address] E  [facility] [place], [place] - [address] [place] [address]    Clinical concerns: None       Priscila Balderas LPN            "

## 2022-12-08 NOTE — LETTER
12/8/2022         RE: Jes Mistry  [id number] [facility] [address]        Dear Colleague,    Thank you for referring your patient, Jes Mistry, to the [facility] [place]. Please see a copy of my visit note below.    [facility] Hematology and Oncology Progress Note    Patient: Jes Mistry  MRN: [id number]  Date of Service: Dec 8, 2022          Reason for Visit    Chief Complaint   Patient presents with     Oncology Clinic Visit     Malignant neoplasm of lower-inner quadrant of right breast of female, estrogen receptor negative (H)       Assessment and Plan     Cancer Staging   Malignant neoplasm of lower-inner quadrant of right breast of female, estrogen receptor negative (H)  Staging form: Breast, AJCC 8th Edition  - Clinical stage from 10/7/2020: Stage IIB (cT2, cN0, cM0, G3, ER-, ID-, HER2-) - Signed by Kaela Plunkett, APRN CNP on 2/7/2022  - Pathologic stage from 3/22/2021: No Stage Recommended (ypT1b, pN0, cM0, G2, ER+, ID-, HER2-) - Signed by Kaela Plunkett, APRN CNP on 2/7/2022    1. Breast cancer, Stage IIB, biopsy was triple negative, mastectomy did show ER+ at 30%: pt had neoadjuvant chemo with residual disease (6mm). Now on Tamoxifen. Doing well. Tolerating. Will continue current regimen.  She did have her estradiol checked this summer and it did confirm postmenopausal status.  I will discuss with Dr. Olsen if he wants her to switch to an AI.  I did give patient information on anastrozole.  Today there is no evidence of local recurrence. Will return in 6 months.     ECOG Performance    0 - Independent    Distress Screening (within last 30 days)    No data recorded     Pain  Pain Score: No Pain (0)    Problem List    Patient Active Problem List   Diagnosis     HTN (hypertension)     Nonsmoker     Malignant neoplasm of lower-inner quadrant of right breast of female, estrogen receptor negative (H)     Screening for cervical cancer      Neck mass        ______________________________________________________________________________    History of Present Illness    Diagnosis:     1.  Carcinoma of the right breast: High-grade.  6 o'clock position.  Diagnosed September 2020. Estrogen receptor positive in 1%.  SC and HER-2 negative.  Measured 24 x 13 x 19 mm on ultrasound. Ultrasound of the axilla was negative.  2 sentinel nodes are negative.     Treatment:     Neoadjuvant chemotherapy with Adriamycin and Cytoxan started October 9, 2020.  Weekly Taxol started December 3, 2020.     Right-sided mastectomy performed March 21, 2021: Final pathology shows residual invasive ductal carcinoma, grade 2, 6 x 6 x 4 mm.  Negative margins.  ER positive at 30%.  HER-2/emily negative by FISH.     Tamoxifen initiated April 2021.     Interim History:  She is here today for a follow-up visit.  She was last seen 6 months ago.  Overall she says she is feeling really quite well.  She denies any new signs or symptoms that she is worried about.  Denies any new bone or back pain.  Denies any weight loss.  She says she is actually gained a little bit of weight.  She denies any fevers or infectious complaints.  Chest wall is stable.  She has not had any return of her menstruation.        Review of Systems    Pertinent items are noted in HPI.    Past History    Past Medical History:   Diagnosis Date     Breast cancer (H) 2020    stage IIB, ER+, SC neg, HER2 neg     History of anesthesia complications     Slow to wake up     HTN (hypertension)      Nonsmoker      Thyroid nodule 2011       PHYSICAL EXAM  /63   Pulse 70   Temp 98.6  F (37  C)   Resp 16   Wt 60 kg (132 lb 4.8 oz)   SpO2 97%   BMI 23.07 kg/m      GENERAL: no acute distress. Cooperative in conversation. Here alone. Mask on  RESP: Regular respiratory rate. No expiratory wheezes   MUSCULOSKELETAL: no bilateral leg swelling  NEURO: non focal. Alert and oriented x3.   PSYCH: within normal limits. No depression or  anxiety.  SKIN: exposed skin is dry intact.    BREAST: Chest wall exam done.  She status post mastectomies.  There is no abnormal rashes lesions noted.  LYMPH: No axillary lymphadenopathy bilaterally.    Lab Results    Recent Results (from the past 168 hour(s))   Comprehensive metabolic panel   Result Value Ref Range    Sodium 139 136 - 145 mmol/L    Potassium 3.8 3.4 - 5.3 mmol/L    Chloride 103 98 - 107 mmol/L    Carbon Dioxide (CO2) 29 22 - 29 mmol/L    Anion Gap 7 7 - 15 mmol/L    Urea Nitrogen 10.8 6.0 - 20.0 mg/dL    Creatinine 0.63 0.51 - 0.95 mg/dL    Calcium 9.0 8.6 - 10.0 mg/dL    Glucose 113 (H) 70 - 99 mg/dL    Alkaline Phosphatase 81 35 - 104 U/L    AST 29 10 - 35 U/L    ALT 36 (H) 10 - 35 U/L    Protein Total 6.7 6.4 - 8.3 g/dL    Albumin 4.1 3.5 - 5.2 g/dL    Bilirubin Total 0.2 <=1.2 mg/dL    GFR Estimate >90 >60 mL/min/1.73m2   CBC with platelets and differential   Result Value Ref Range    WBC Count 5.1 4.0 - 11.0 10e3/uL    RBC Count 4.29 3.80 - 5.20 10e6/uL    Hemoglobin 13.0 11.7 - 15.7 g/dL    Hematocrit 38.8 35.0 - 47.0 %    MCV 90 78 - 100 fL    MCH 30.3 26.5 - 33.0 pg    MCHC 33.5 31.5 - 36.5 g/dL    RDW 12.9 10.0 - 15.0 %    Platelet Count 211 150 - 450 10e3/uL    % Neutrophils 64 %    % Lymphocytes 26 %    % Monocytes 8 %    % Eosinophils 2 %    % Basophils 0 %    % Immature Granulocytes 0 %    NRBCs per 100 WBC 0 <1 /100    Absolute Neutrophils 3.2 1.6 - 8.3 10e3/uL    Absolute Lymphocytes 1.3 0.8 - 5.3 10e3/uL    Absolute Monocytes 0.4 0.0 - 1.3 10e3/uL    Absolute Eosinophils 0.1 0.0 - 0.7 10e3/uL    Absolute Basophils 0.0 0.0 - 0.2 10e3/uL    Absolute Immature Granulocytes 0.0 <=0.4 10e3/uL    Absolute NRBCs 0.0 10e3/uL       Imaging    US Pelvic Complete with Transvaginal    Result Date: 11/9/2022  EXAM: US PELVIC TRANSABDOMINAL AND TRANSVAGINAL LOCATION: [facility] DATE/TIME: 11/9/2022 2:00 PM INDICATION:  Pelvic pain COMPARISON: PET CT 10/09/2020 TECHNIQUE:  "Transabdominal scans were performed. Endovaginal ultrasound was performed to better visualize the adnexa. FINDINGS: UTERUS: 6.4 x 3.7 x 3.9 cm. Multiple intramural fibroids. Indistinct endometrial-myometrial interface with subendometrial cystic changes. ENDOMETRIUM: 4 mm. Normal smooth endometrium. RIGHT OVARY: 2.8 x 1.8 x 1.2 cm. Normal. Calcification between the uterus and right ovary. LEFT OVARY: 3.0 x 1.4 x 1.2 cm. Normal. Mild amount of free fluid.     IMPRESSION: 1.  Multiple small intramural uterine fibroids. 2.  Indistinct endometrial-myometrial interface raising the possibility of adenomyosis. This could be confirmed with MRI, as indicated.         Signed by: NARA Chan CNP    Oncology Rooming Note    December 8, 2022 1:43 PM   Jes Mistry is a 47 year old female who presents for:    Chief Complaint   Patient presents with     Oncology Clinic Visit     Malignant neoplasm of lower-inner quadrant of right breast of female, estrogen receptor negative (H)     Initial Vitals: /63   Pulse 70   Temp 98.6  F (37  C)   Resp 16   Wt 60 kg (132 lb 4.8 oz)   SpO2 97%   BMI 23.07 kg/m   Estimated body mass index is 23.07 kg/m  as calculated from the following:    Height as of 11/15/22: 1.613 m (5' 3.5\").    Weight as of this encounter: 60 kg (132 lb 4.8 oz). Body surface area is 1.64 meters squared.  No Pain (0) Comment: Data Unavailable   No LMP recorded. (Menstrual status: Chemotherapy).  Allergies reviewed: Yes  Medications reviewed: Yes    Medications: Medication refills not needed today.  Pharmacy name entered into [employer]:    [id number] - [place], [place] - [address] [facility] [address]  [facility] [place], [place] - [address] [place] [address]    Clinical concerns: None       Priscila Balderas LPN                Again, thank you for allowing me to participate in the care of your patient.        Sincerely,        NARA Chan CNP    "

## 2022-12-08 NOTE — PROGRESS NOTES
[facility] Hematology and Oncology Progress Note    Patient: Jes Mistry  MRN: [id number]  Date of Service: Dec 8, 2022          Reason for Visit    Chief Complaint   Patient presents with     Oncology Clinic Visit     Malignant neoplasm of lower-inner quadrant of right breast of female, estrogen receptor negative (H)       Assessment and Plan     Cancer Staging   Malignant neoplasm of lower-inner quadrant of right breast of female, estrogen receptor negative (H)  Staging form: Breast, AJCC 8th Edition  - Clinical stage from 10/7/2020: Stage IIB (cT2, cN0, cM0, G3, ER-, OR-, HER2-) - Signed by Kaela Plunkett, NARA CNP on 2/7/2022  - Pathologic stage from 3/22/2021: No Stage Recommended (ypT1b, pN0, cM0, G2, ER+, OR-, HER2-) - Signed by Kaela Plunkett, NARA CNP on 2/7/2022    1. Breast cancer, Stage IIB, biopsy was triple negative, mastectomy did show ER+ at 30%: pt had neoadjuvant chemo with residual disease (6mm). Now on Tamoxifen. Doing well. Tolerating. Will continue current regimen.  She did have her estradiol checked this summer and it did confirm postmenopausal status.  Dr. Olsen does not want to switch to AI yet. I did give her a little information on anastrozole for the future.  Today there is no evidence of local recurrence. Will return in 6 months.     ECOG Performance    0 - Independent    Distress Screening (within last 30 days)    No data recorded     Pain  Pain Score: No Pain (0)    Problem List    Patient Active Problem List   Diagnosis     HTN (hypertension)     Nonsmoker     Malignant neoplasm of lower-inner quadrant of right breast of female, estrogen receptor negative (H)     Screening for cervical cancer     Neck mass        ______________________________________________________________________________    History of Present Illness    Diagnosis:     1.  Carcinoma of the right breast: High-grade.  6 o'clock position.  Diagnosed September 2020. Estrogen receptor positive in  1%.  AK and HER-2 negative.  Measured 24 x 13 x 19 mm on ultrasound. Ultrasound of the axilla was negative.  2 sentinel nodes are negative.     Treatment:     Neoadjuvant chemotherapy with Adriamycin and Cytoxan started October 9, 2020.  Weekly Taxol started December 3, 2020.     Right-sided mastectomy performed March 21, 2021: Final pathology shows residual invasive ductal carcinoma, grade 2, 6 x 6 x 4 mm.  Negative margins.  ER positive at 30%.  HER-2/emily negative by FISH.     Tamoxifen initiated April 2021.     Interim History:  She is here today for a follow-up visit.  She was last seen 6 months ago.  Overall she says she is feeling really quite well.  She denies any new signs or symptoms that she is worried about.  Denies any new bone or back pain.  Denies any weight loss.  She says she is actually gained a little bit of weight.  She denies any fevers or infectious complaints.  Chest wall is stable.  She has not had any return of her menstruation.        Review of Systems    Pertinent items are noted in HPI.    Past History    Past Medical History:   Diagnosis Date     Breast cancer (H) 2020    stage IIB, ER+, AK neg, HER2 neg     History of anesthesia complications     Slow to wake up     HTN (hypertension)      Nonsmoker      Thyroid nodule 2011       PHYSICAL EXAM  /63   Pulse 70   Temp 98.6  F (37  C)   Resp 16   Wt 60 kg (132 lb 4.8 oz)   SpO2 97%   BMI 23.07 kg/m      GENERAL: no acute distress. Cooperative in conversation. Here alone. Mask on  RESP: Regular respiratory rate. No expiratory wheezes   MUSCULOSKELETAL: no bilateral leg swelling  NEURO: non focal. Alert and oriented x3.   PSYCH: within normal limits. No depression or anxiety.  SKIN: exposed skin is dry intact.    BREAST: Chest wall exam done.  She status post mastectomies.  There is no abnormal rashes lesions noted.  LYMPH: No axillary lymphadenopathy bilaterally.    Lab Results    Recent Results (from the past 168 hour(s))    Comprehensive metabolic panel   Result Value Ref Range    Sodium 139 136 - 145 mmol/L    Potassium 3.8 3.4 - 5.3 mmol/L    Chloride 103 98 - 107 mmol/L    Carbon Dioxide (CO2) 29 22 - 29 mmol/L    Anion Gap 7 7 - 15 mmol/L    Urea Nitrogen 10.8 6.0 - 20.0 mg/dL    Creatinine 0.63 0.51 - 0.95 mg/dL    Calcium 9.0 8.6 - 10.0 mg/dL    Glucose 113 (H) 70 - 99 mg/dL    Alkaline Phosphatase 81 35 - 104 U/L    AST 29 10 - 35 U/L    ALT 36 (H) 10 - 35 U/L    Protein Total 6.7 6.4 - 8.3 g/dL    Albumin 4.1 3.5 - 5.2 g/dL    Bilirubin Total 0.2 <=1.2 mg/dL    GFR Estimate >90 >60 mL/min/1.73m2   CBC with platelets and differential   Result Value Ref Range    WBC Count 5.1 4.0 - 11.0 10e3/uL    RBC Count 4.29 3.80 - 5.20 10e6/uL    Hemoglobin 13.0 11.7 - 15.7 g/dL    Hematocrit 38.8 35.0 - 47.0 %    MCV 90 78 - 100 fL    MCH 30.3 26.5 - 33.0 pg    MCHC 33.5 31.5 - 36.5 g/dL    RDW 12.9 10.0 - 15.0 %    Platelet Count 211 150 - 450 10e3/uL    % Neutrophils 64 %    % Lymphocytes 26 %    % Monocytes 8 %    % Eosinophils 2 %    % Basophils 0 %    % Immature Granulocytes 0 %    NRBCs per 100 WBC 0 <1 /100    Absolute Neutrophils 3.2 1.6 - 8.3 10e3/uL    Absolute Lymphocytes 1.3 0.8 - 5.3 10e3/uL    Absolute Monocytes 0.4 0.0 - 1.3 10e3/uL    Absolute Eosinophils 0.1 0.0 - 0.7 10e3/uL    Absolute Basophils 0.0 0.0 - 0.2 10e3/uL    Absolute Immature Granulocytes 0.0 <=0.4 10e3/uL    Absolute NRBCs 0.0 10e3/uL       Imaging    US Pelvic Complete with Transvaginal    Result Date: 11/9/2022  EXAM: US PELVIC TRANSABDOMINAL AND TRANSVAGINAL LOCATION: [facility] DATE/TIME: 11/9/2022 2:00 PM INDICATION:  Pelvic pain COMPARISON: PET CT 10/09/2020 TECHNIQUE: Transabdominal scans were performed. Endovaginal ultrasound was performed to better visualize the adnexa. FINDINGS: UTERUS: 6.4 x 3.7 x 3.9 cm. Multiple intramural fibroids. Indistinct endometrial-myometrial interface with subendometrial cystic changes.  ENDOMETRIUM: 4 mm. Normal smooth endometrium. RIGHT OVARY: 2.8 x 1.8 x 1.2 cm. Normal. Calcification between the uterus and right ovary. LEFT OVARY: 3.0 x 1.4 x 1.2 cm. Normal. Mild amount of free fluid.     IMPRESSION: 1.  Multiple small intramural uterine fibroids. 2.  Indistinct endometrial-myometrial interface raising the possibility of adenomyosis. This could be confirmed with MRI, as indicated.         Signed by: NARA Chan CNP

## 2023-01-24 NOTE — PROGRESS NOTES
Alice presents to [facility] today for an RN visit for drain removal.  Her drain has been putting out 10 ml or less for the last few days.  RN assessment and EMR update. Drain removed without problems.  Covered with 4x4, told her to change if it becomes wet, otherwise leave on until tomorrow then can shower and replace if needed.  We had already set up an appointment for her to see Dr. Will next week, 4-29-21.  Told Alice to call sooner with any needs.  Support provided, invited calls.  RN time 20 mins  
Regular rate and rhythm, Heart sounds S1 S2 present, no murmurs, rubs or gallops

## 2023-04-23 NOTE — ANESTHESIA PREPROCEDURE EVALUATION
Anesthesia Pre-Procedure Evaluation    Patient: Jes Mistry   MRN: [id number] : 1975        Preoperative Diagnosis: Neck mass [R22.1]    Procedure : Procedure(s):  EXCISION, MASS, NECK          Past Medical History:   Diagnosis Date     Complication of anesthesia     slow to wake     History of anesthesia complications     Slow to wake up     HTN (hypertension)      Nonsmoker      Thyroid nodule       Past Surgical History:   Procedure Laterality Date     ABDOMEN SURGERY        SECTION       NH INSJ PRPH CTR VAD W/SUBQ PORT AGE 5 YR/> Left 10/08/2020    Procedure: Port Placement;  Surgeon: Kimberly Will MD;  Location: [facility];  Service: General     NH MASTECTOMY, SIMPLE, COMPLETE Bilateral 2021    Procedure: Bilateral Mastectomies; Right [place] Lymph Node Biopsy; Port Removal;  Surgeon: Kimberly Will MD;  Location: [facility];  Service: General     US BREAST CLIP PLACEMENT RIGHT Right 10/08/2020      SENTINEL NODE INJECTION  2021      Allergies   Allergen Reactions     Sulfa (Sulfonamide Antibiotics) [Sulfa Drugs] Unknown     Latex Itching      Social History     Tobacco Use     Smoking status: Never Smoker     Smokeless tobacco: Never Used   Substance Use Topics     Alcohol use: No      Wt Readings from Last 1 Encounters:   22 57.2 kg (126 lb)        Anesthesia Evaluation   Pt has had prior anesthetic. Type: General.    No history of anesthetic complications       ROS/MED HX  ENT/Pulmonary:  - neg pulmonary ROS  (-) tobacco use, COPD, sleep apnea, SILVIO risk factors, recent URI, allergic rhinitis and vocal abnormalities   Neurologic:  - neg neurologic ROS     Cardiovascular:     (+) hypertension----- (-) murmur and wheezes   METS/Exercise Tolerance: >4 METS    Hematologic:  - neg hematologic  ROS     Musculoskeletal:  - neg musculoskeletal ROS     GI/Hepatic:  - neg GI/hepatic ROS     Renal/Genitourinary:  - neg Renal ROS     Endo:     (+)  thyroid problem,     Psychiatric/Substance Use:  - neg psychiatric ROS     Infectious Disease:  - neg infectious disease ROS     Malignancy:   (+) Malignancy, History of Breast.    Other:  - neg other ROS          Physical Exam    Airway  airway exam normal      Mallampati: II   TM distance: > 3 FB   Neck ROM: full   Mouth opening: > 3 cm    Respiratory Devices and Support         Dental  no notable dental history         Cardiovascular       Comment: Occasional PVCs   Rhythm and rate: regular and normal (-) no murmur    Pulmonary           breath sounds clear to auscultation   (-) no wheezes        OUTSIDE LABS:  CBC:   Lab Results   Component Value Date    WBC 5.1 01/13/2022    WBC 4.6 06/16/2021    HGB 14.4 01/13/2022    HGB 14.8 06/16/2021    HCT 42.1 01/13/2022    HCT 42.7 06/16/2021     01/13/2022     06/16/2021     BMP:   Lab Results   Component Value Date     01/13/2022     10/15/2021    POTASSIUM 3.8 01/13/2022    POTASSIUM 3.5 10/15/2021    CHLORIDE 101 01/13/2022    CHLORIDE 100 10/15/2021    CO2 28 01/13/2022    CO2 29 10/15/2021    BUN 14 01/13/2022    BUN 13 10/15/2021    CR 0.75 01/13/2022    CR 0.72 10/15/2021    GLC 91 01/13/2022     10/15/2021     COAGS: No results found for: PTT, INR, FIBR  POC: No results found for: BGM, HCG, HCGS  HEPATIC:   Lab Results   Component Value Date    ALBUMIN 4.2 10/15/2021    PROTTOTAL 8.2 (H) 10/15/2021    ALT 54 (H) 10/15/2021    AST 40 10/15/2021    ALKPHOS 87 10/15/2021    BILITOTAL 0.4 10/15/2021     OTHER:   Lab Results   Component Value Date    A1C 5.3 11/10/2021    ANETTE 10.1 01/13/2022    TSH 5.44 (H) 10/15/2021    T4 0.89 10/15/2021    T3 100 01/23/2020       Anesthesia Plan    ASA Status:  2   NPO Status:  NPO Appropriate    Anesthesia Type: General.     - Airway: ETT   Induction: Intravenous, Propofol.   Maintenance: TIVA.        Consents    Anesthesia Plan(s) and associated risks, benefits, and realistic alternatives  discussed. Questions answered and patient/representative(s) expressed understanding.    - Discussed:     - Discussed with:  Patient      - Patient is DNR/DNI Status: No         Postoperative Care    Pain management: IV analgesics, Oral pain medications.   PONV prophylaxis: Ondansetron (or other 5HT-3), Dexamethasone or Solumedrol     Comments:                Jd Roberts MD   171.9

## 2023-05-17 DIAGNOSIS — Z17.1 MALIGNANT NEOPLASM OF LOWER-INNER QUADRANT OF RIGHT BREAST OF FEMALE, ESTROGEN RECEPTOR NEGATIVE (H): Primary | ICD-10-CM

## 2023-05-17 DIAGNOSIS — C50.311 MALIGNANT NEOPLASM OF LOWER-INNER QUADRANT OF RIGHT BREAST OF FEMALE, ESTROGEN RECEPTOR NEGATIVE (H): Primary | ICD-10-CM

## 2023-05-26 ENCOUNTER — ONCOLOGY VISIT (OUTPATIENT)
Dept: ONCOLOGY | Facility: HOSPITAL | Age: 48
End: 2023-05-26
Attending: INTERNAL MEDICINE
Payer: COMMERCIAL

## 2023-05-26 ENCOUNTER — LAB (OUTPATIENT)
Dept: LAB | Facility: HOSPITAL | Age: 48
End: 2023-05-26
Attending: INTERNAL MEDICINE
Payer: COMMERCIAL

## 2023-05-26 VITALS
RESPIRATION RATE: 18 BRPM | WEIGHT: 131.3 LBS | SYSTOLIC BLOOD PRESSURE: 118 MMHG | HEIGHT: 64 IN | TEMPERATURE: 97.8 F | HEART RATE: 82 BPM | OXYGEN SATURATION: 99 % | BODY MASS INDEX: 22.42 KG/M2 | DIASTOLIC BLOOD PRESSURE: 76 MMHG

## 2023-05-26 DIAGNOSIS — Z17.1 MALIGNANT NEOPLASM OF LOWER-INNER QUADRANT OF RIGHT BREAST OF FEMALE, ESTROGEN RECEPTOR NEGATIVE (H): Primary | ICD-10-CM

## 2023-05-26 DIAGNOSIS — C50.311 MALIGNANT NEOPLASM OF LOWER-INNER QUADRANT OF RIGHT BREAST OF FEMALE, ESTROGEN RECEPTOR NEGATIVE (H): Primary | ICD-10-CM

## 2023-05-26 DIAGNOSIS — Z17.1 MALIGNANT NEOPLASM OF LOWER-INNER QUADRANT OF RIGHT BREAST OF FEMALE, ESTROGEN RECEPTOR NEGATIVE (H): ICD-10-CM

## 2023-05-26 DIAGNOSIS — C50.311 MALIGNANT NEOPLASM OF LOWER-INNER QUADRANT OF RIGHT BREAST OF FEMALE, ESTROGEN RECEPTOR NEGATIVE (H): ICD-10-CM

## 2023-05-26 LAB
ALBUMIN SERPL BCG-MCNC: 4.3 G/DL (ref 3.5–5.2)
ALP SERPL-CCNC: 102 U/L (ref 35–104)
ALT SERPL W P-5'-P-CCNC: 44 U/L (ref 10–35)
ANION GAP SERPL CALCULATED.3IONS-SCNC: 12 MMOL/L (ref 7–15)
AST SERPL W P-5'-P-CCNC: 38 U/L (ref 10–35)
BASOPHILS # BLD AUTO: 0 10E3/UL (ref 0–0.2)
BASOPHILS NFR BLD AUTO: 0 %
BILIRUB SERPL-MCNC: 0.3 MG/DL
BUN SERPL-MCNC: 13.4 MG/DL (ref 6–20)
CALCIUM SERPL-MCNC: 9.6 MG/DL (ref 8.6–10)
CHLORIDE SERPL-SCNC: 100 MMOL/L (ref 98–107)
CREAT SERPL-MCNC: 0.67 MG/DL (ref 0.51–0.95)
DEPRECATED HCO3 PLAS-SCNC: 26 MMOL/L (ref 22–29)
EOSINOPHIL # BLD AUTO: 0.1 10E3/UL (ref 0–0.7)
EOSINOPHIL NFR BLD AUTO: 2 %
ERYTHROCYTE [DISTWIDTH] IN BLOOD BY AUTOMATED COUNT: 13.1 % (ref 10–15)
GFR SERPL CREATININE-BSD FRML MDRD: >90 ML/MIN/1.73M2
GLUCOSE SERPL-MCNC: 150 MG/DL (ref 70–99)
HCT VFR BLD AUTO: 41.2 % (ref 35–47)
HGB BLD-MCNC: 14 G/DL (ref 11.7–15.7)
IMM GRANULOCYTES # BLD: 0 10E3/UL
IMM GRANULOCYTES NFR BLD: 1 %
LYMPHOCYTES # BLD AUTO: 1.5 10E3/UL (ref 0.8–5.3)
LYMPHOCYTES NFR BLD AUTO: 26 %
MCH RBC QN AUTO: 30 PG (ref 26.5–33)
MCHC RBC AUTO-ENTMCNC: 34 G/DL (ref 31.5–36.5)
MCV RBC AUTO: 88 FL (ref 78–100)
MONOCYTES # BLD AUTO: 0.5 10E3/UL (ref 0–1.3)
MONOCYTES NFR BLD AUTO: 8 %
NEUTROPHILS # BLD AUTO: 3.5 10E3/UL (ref 1.6–8.3)
NEUTROPHILS NFR BLD AUTO: 63 %
NRBC # BLD AUTO: 0 10E3/UL
NRBC BLD AUTO-RTO: 0 /100
PLATELET # BLD AUTO: 217 10E3/UL (ref 150–450)
POTASSIUM SERPL-SCNC: 4 MMOL/L (ref 3.4–5.3)
PROT SERPL-MCNC: 7.2 G/DL (ref 6.4–8.3)
RBC # BLD AUTO: 4.67 10E6/UL (ref 3.8–5.2)
SODIUM SERPL-SCNC: 138 MMOL/L (ref 136–145)
WBC # BLD AUTO: 5.6 10E3/UL (ref 4–11)

## 2023-05-26 PROCEDURE — 80053 COMPREHEN METABOLIC PANEL: CPT

## 2023-05-26 PROCEDURE — 36415 COLL VENOUS BLD VENIPUNCTURE: CPT

## 2023-05-26 PROCEDURE — 99214 OFFICE O/P EST MOD 30 MIN: CPT | Performed by: INTERNAL MEDICINE

## 2023-05-26 PROCEDURE — 99213 OFFICE O/P EST LOW 20 MIN: CPT | Performed by: INTERNAL MEDICINE

## 2023-05-26 PROCEDURE — 85025 COMPLETE CBC W/AUTO DIFF WBC: CPT

## 2023-05-26 ASSESSMENT — PAIN SCALES - GENERAL: PAINLEVEL: NO PAIN (0)

## 2023-05-26 NOTE — PROGRESS NOTES
"Oncology Rooming Note    May 26, 2023 8:35 AM   Jes Mistry is a 48 year old female who presents for:    Chief Complaint   Patient presents with     Oncology Clinic Visit     Malignant neoplasm of lower-inner quadrant of right breast of female, estrogen receptor negative (H)     Initial Vitals: /76   Pulse 82   Temp 97.8  F (36.6  C)   Resp 18   Ht 1.613 m (5' 3.5\")   Wt 59.6 kg (131 lb 4.8 oz)   SpO2 99%   BMI 22.89 kg/m   Estimated body mass index is 22.89 kg/m  as calculated from the following:    Height as of this encounter: 1.613 m (5' 3.5\").    Weight as of this encounter: 59.6 kg (131 lb 4.8 oz). Body surface area is 1.63 meters squared.  No Pain (0) Comment: Data Unavailable   No LMP recorded. (Menstrual status: Chemotherapy).  Allergies reviewed: Yes  Medications reviewed: Yes    Medications: Medication refills not needed today.  Pharmacy name entered into [employer]:    [id number] - [place], [place] - [address] [place] [address] E  [facility] [address]    Clinical concerns: Discomfort under both arms. Concerned about it being swollen.        Priscila Balderas LPN            "

## 2023-05-26 NOTE — LETTER
"    5/26/2023         RE: Jes Mistry  [address] [facility] [address]        Dear Colleague,    Thank you for referring your patient, Jes Mistry, to the [facility]. Please see a copy of my visit note below.    Oncology Rooming Note    May 26, 2023 8:35 AM   Jes Mistry is a 48 year old female who presents for:    Chief Complaint   Patient presents with     Oncology Clinic Visit     Malignant neoplasm of lower-inner quadrant of right breast of female, estrogen receptor negative (H)     Initial Vitals: /76   Pulse 82   Temp 97.8  F (36.6  C)   Resp 18   Ht 1.613 m (5' 3.5\")   Wt 59.6 kg (131 lb 4.8 oz)   SpO2 99%   BMI 22.89 kg/m   Estimated body mass index is 22.89 kg/m  as calculated from the following:    Height as of this encounter: 1.613 m (5' 3.5\").    Weight as of this encounter: 59.6 kg (131 lb 4.8 oz). Body surface area is 1.63 meters squared.  No Pain (0) Comment: Data Unavailable   No LMP recorded. (Menstrual status: Chemotherapy).  Allergies reviewed: Yes  Medications reviewed: Yes    Medications: Medication refills not needed today.  Pharmacy name entered into [facility]:    [id number] - [address]  [facility] [place], [place] - [address] [place] [address]    Clinical concerns: Discomfort under both arms. Concerned about it being swollen.        Priscila Balderas LPN              [facility] Hematology and Oncology Progress Note    Patient: Jes Mistry  MRN: [id number]  Date of Service: May 26, 2023        Assessment and Plan:    Cancer Staging  Malignant neoplasm of lower-inner quadrant of right breast of female, estrogen receptor negative (H)  Staging form: Breast, AJCC 8th Edition  - Clinical: Stage IIB (cT2, cN0, cM0, G3, ER-, WI-, HER2-) - Signed by Kaela Plunkett CNP on 10/9/2020     1.  Invasive ductal carcinoma: Continues on single agent tamoxifen.  Having " hot flashes but these are tolerable for her.  No major sweats.  No edema shortness of breath.  I do not appreciate any evidence of recurrence on clinical exam.  I will palpate any nodule or mass in either axilla.  Chest wall is normal bilaterally.  We will have her return to clinic in 4 months for routine follow-up.  I asked her to give us a call in the interim if she has concerns about self exam.  No routine imaging is needed.  We also discussed switching to aromatase inhibitor as her hormone studies appear to be most consistent with postmenopausal status.  For now she would like to stay on tamoxifen.  Labs from today are reviewed and show a normal calcium at 9.6, BUN 13.4, creatinine 0.67  CBC was reviewed and shows a white count of 5.6, hemoglobin 14.0, mean cell volume 88.    2.  Elevated liver function test: Today the AST was 38, ALT 44 alkaline phosphatase normal at 102 and bilirubin normal at 0.3.  Her numbers have been stable going back to June 2022.  We will continue to monitor clinically.  We can consider imaging if her numbers worsen.    I spent 36 minutes in the care of this patient today, which included time necessary for preparation for the visit, face to face time with the patient, communication of recommendations to the care team, and documentation time.    ECOG Performance  0    Diagnosis:    1.  Carcinoma of the right breast: High-grade.  6 o'clock position.  Diagnosed September 2020. Estrogen receptor positive in 1%.  NM and HER-2 negative.  Measured 24 x 13 x 19 mm on ultrasound. Ultrasound of the axilla was negative. 2 sentinel nodes are negative.    Treatment:    Neoadjuvant chemotherapy with Adriamycin and Cytoxan started October 9, 2020.  Weekly Taxol started December 3, 2020.     Bilateral mastectomy performed March 24, 2021: Final pathology shows residual invasive ductal carcinoma, grade 2, 6 x 6 x 4 mm.  Negative margins.  ER positive at 30%.  HER-2/emily negative by FISH.  Adjuvant Xeloda  "was not offered as her residual disease was estrogen receptor positive.    Tamoxifen initiated April 2021.    Interim History:    Alice is here today for a follow-up visit.  She remains on single agent tamoxifen.  Feeling okay.  She is concerned however about tissue fullness in the axillae bilaterally.  Not so much pain just fullness.  She does note that she has not been doing much physically as per her usual after surgery.  She has no headaches, cough, or shortness of breath or lower extremity edema.  She does note that she has hot flashes throughout the day but many times will notice them.  No night sweats.  No other acute complaints today.    Review of Systems:    As above in the history.     Review of Systems otherwise Negative for:  General: chills, fever or night sweats  Psychological: anxiety or depression  Ophthalmic: blurry vision, double vision or loss of vision, vision change  ENT: epistaxis, oral lesions, hearing changes  Hematological and Lymphatic: bleeding, bruising, jaundice, swollen lymph nodes  Endocrine: unexpected weight changes  Respiratory: cough, hemoptysis, orthopnea or shortness of breath/ACHARYA  Cardiovascular: chest pain, edema, palpitations or PND  Gastrointestinal: abdominal pain, blood in stools, change in bowel habits, constipation, nausea/vomiting  Genito-Urinary: change in urinary stream, incontinence, frequency/urgency  Musculoskeletal: joint pain, stiffness, swelling, muscle pain  Neurological: dizziness, headaches, numbness/tingling  Dermatological: lumps and rash    Past History:    Past Medical History:   Diagnosis Date     Breast cancer (H) 2020    stage IIB, ER+, DC neg, HER2 neg     History of anesthesia complications     Slow to wake up     HTN (hypertension)      Nonsmoker      Thyroid nodule 2011     Physical Exam:    /76   Pulse 82   Temp 97.8  F (36.6  C)   Resp 18   Ht 1.613 m (5' 3.5\")   Wt 59.6 kg (131 lb 4.8 oz)   SpO2 99%   BMI 22.89 kg/m      General: " patient appears stated age of 46 year old. Nontoxic and in no distress.   HEENT: Head: atraumatic, normocephalic. Sclerae anicteric.  Chest:  Normal respiratory effort.  Thorough examination by palpation of each axilla revealed no mass or firmness.  Skin on the chest wall is normal with no nodule.  Cardiac:  No edema.   Abdomen: abdomen is non-distended  Extremities: normal tone and muscle bulk.  Skin: no lesions or rash on visible skin. Warm and dry.   CNS: alert and oriented. Grossly non-focal.   Psychiatric: normal mood and affect.     Lab Results:    Recent Results (from the past 168 hour(s))   Comprehensive metabolic panel   Result Value Ref Range    Sodium 138 136 - 145 mmol/L    Potassium 4.0 3.4 - 5.3 mmol/L    Chloride 100 98 - 107 mmol/L    Carbon Dioxide (CO2) 26 22 - 29 mmol/L    Anion Gap 12 7 - 15 mmol/L    Urea Nitrogen 13.4 6.0 - 20.0 mg/dL    Creatinine 0.67 0.51 - 0.95 mg/dL    Calcium 9.6 8.6 - 10.0 mg/dL    Glucose 150 (H) 70 - 99 mg/dL    Alkaline Phosphatase 102 35 - 104 U/L    AST 38 (H) 10 - 35 U/L    ALT 44 (H) 10 - 35 U/L    Protein Total 7.2 6.4 - 8.3 g/dL    Albumin 4.3 3.5 - 5.2 g/dL    Bilirubin Total 0.3 <=1.2 mg/dL    GFR Estimate >90 >60 mL/min/1.73m2   CBC with platelets and differential   Result Value Ref Range    WBC Count 5.6 4.0 - 11.0 10e3/uL    RBC Count 4.67 3.80 - 5.20 10e6/uL    Hemoglobin 14.0 11.7 - 15.7 g/dL    Hematocrit 41.2 35.0 - 47.0 %    MCV 88 78 - 100 fL    MCH 30.0 26.5 - 33.0 pg    MCHC 34.0 31.5 - 36.5 g/dL    RDW 13.1 10.0 - 15.0 %    Platelet Count 217 150 - 450 10e3/uL    % Neutrophils 63 %    % Lymphocytes 26 %    % Monocytes 8 %    % Eosinophils 2 %    % Basophils 0 %    % Immature Granulocytes 1 %    NRBCs per 100 WBC 0 <1 /100    Absolute Neutrophils 3.5 1.6 - 8.3 10e3/uL    Absolute Lymphocytes 1.5 0.8 - 5.3 10e3/uL    Absolute Monocytes 0.5 0.0 - 1.3 10e3/uL    Absolute Eosinophils 0.1 0.0 - 0.7 10e3/uL    Absolute Basophils 0.0 0.0 - 0.2 10e3/uL     Absolute Immature Granulocytes 0.0 <=0.4 10e3/uL    Absolute NRBCs 0.0 10e3/uL      Imaging:    No results found.      Signed by: Elia Olsen MD        Again, thank you for allowing me to participate in the care of your patient.        Sincerely,        Elia Olsen MD

## 2023-05-26 NOTE — PROGRESS NOTES
[facility] Hematology and Oncology Progress Note    Patient: Jes Mistry  MRN: [id number]  Date of Service: May 26, 2023        Assessment and Plan:    Cancer Staging  Malignant neoplasm of lower-inner quadrant of right breast of female, estrogen receptor negative (H)  Staging form: Breast, AJCC 8th Edition  - Clinical: Stage IIB (cT2, cN0, cM0, G3, ER-, PA-, HER2-) - Signed by Kaela Plunkett CNP on 10/9/2020     1.  Invasive ductal carcinoma: Continues on single agent tamoxifen.  Having hot flashes but these are tolerable for her.  No major sweats.  No edema shortness of breath.  I do not appreciate any evidence of recurrence on clinical exam.  I will palpate any nodule or mass in either axilla.  Chest wall is normal bilaterally.  We will have her return to clinic in 4 months for routine follow-up.  I asked her to give us a call in the interim if she has concerns about self exam.  No routine imaging is needed.  We also discussed switching to aromatase inhibitor as her hormone studies appear to be most consistent with postmenopausal status.  For now she would like to stay on tamoxifen.  Labs from today are reviewed and show a normal calcium at 9.6, BUN 13.4, creatinine 0.67  CBC was reviewed and shows a white count of 5.6, hemoglobin 14.0, mean cell volume 88.    2.  Elevated liver function test: Today the AST was 38, ALT 44 alkaline phosphatase normal at 102 and bilirubin normal at 0.3.  Her numbers have been stable going back to June 2022.  We will continue to monitor clinically.  We can consider imaging if her numbers worsen.    I spent 36 minutes in the care of this patient today, which included time necessary for preparation for the visit, face to face time with the patient, communication of recommendations to the care team, and documentation time.    ECOG Performance  0    Diagnosis:    1.  Carcinoma of the right breast: High-grade.  6 o'clock position.  Diagnosed September 2020.  Estrogen receptor positive in 1%.  NM and HER-2 negative.  Measured 24 x 13 x 19 mm on ultrasound. Ultrasound of the axilla was negative. 2 sentinel nodes are negative.    Treatment:    Neoadjuvant chemotherapy with Adriamycin and Cytoxan started October 9, 2020.  Weekly Taxol started December 3, 2020.     Bilateral mastectomy performed March 24, 2021: Final pathology shows residual invasive ductal carcinoma, grade 2, 6 x 6 x 4 mm.  Negative margins.  ER positive at 30%.  HER-2/emily negative by FISH.  Adjuvant Xeloda was not offered as her residual disease was estrogen receptor positive.    Tamoxifen initiated April 2021.    Interim History:    Alice is here today for a follow-up visit.  She remains on single agent tamoxifen.  Feeling okay.  She is concerned however about tissue fullness in the axillae bilaterally.  Not so much pain just fullness.  She does note that she has not been doing much physically as per her usual after surgery.  She has no headaches, cough, or shortness of breath or lower extremity edema.  She does note that she has hot flashes throughout the day but many times will notice them.  No night sweats.  No other acute complaints today.    Review of Systems:    As above in the history.     Review of Systems otherwise Negative for:  General: chills, fever or night sweats  Psychological: anxiety or depression  Ophthalmic: blurry vision, double vision or loss of vision, vision change  ENT: epistaxis, oral lesions, hearing changes  Hematological and Lymphatic: bleeding, bruising, jaundice, swollen lymph nodes  Endocrine: unexpected weight changes  Respiratory: cough, hemoptysis, orthopnea or shortness of breath/ACHARYA  Cardiovascular: chest pain, edema, palpitations or PND  Gastrointestinal: abdominal pain, blood in stools, change in bowel habits, constipation, nausea/vomiting  Genito-Urinary: change in urinary stream, incontinence, frequency/urgency  Musculoskeletal: joint pain, stiffness, swelling,  "muscle pain  Neurological: dizziness, headaches, numbness/tingling  Dermatological: lumps and rash    Past History:    Past Medical History:   Diagnosis Date     Breast cancer (H) 2020    stage IIB, ER+, LA neg, HER2 neg     History of anesthesia complications     Slow to wake up     HTN (hypertension)      Nonsmoker      Thyroid nodule 2011     Physical Exam:    /76   Pulse 82   Temp 97.8  F (36.6  C)   Resp 18   Ht 1.613 m (5' 3.5\")   Wt 59.6 kg (131 lb 4.8 oz)   SpO2 99%   BMI 22.89 kg/m      General: patient appears stated age of 46 year old. Nontoxic and in no distress.   HEENT: Head: atraumatic, normocephalic. Sclerae anicteric.  Chest:  Normal respiratory effort.  Thorough examination by palpation of each axilla revealed no mass or firmness.  Skin on the chest wall is normal with no nodule.  Cardiac:  No edema.   Abdomen: abdomen is non-distended  Extremities: normal tone and muscle bulk.  Skin: no lesions or rash on visible skin. Warm and dry.   CNS: alert and oriented. Grossly non-focal.   Psychiatric: normal mood and affect.     Lab Results:    Recent Results (from the past 168 hour(s))   Comprehensive metabolic panel   Result Value Ref Range    Sodium 138 136 - 145 mmol/L    Potassium 4.0 3.4 - 5.3 mmol/L    Chloride 100 98 - 107 mmol/L    Carbon Dioxide (CO2) 26 22 - 29 mmol/L    Anion Gap 12 7 - 15 mmol/L    Urea Nitrogen 13.4 6.0 - 20.0 mg/dL    Creatinine 0.67 0.51 - 0.95 mg/dL    Calcium 9.6 8.6 - 10.0 mg/dL    Glucose 150 (H) 70 - 99 mg/dL    Alkaline Phosphatase 102 35 - 104 U/L    AST 38 (H) 10 - 35 U/L    ALT 44 (H) 10 - 35 U/L    Protein Total 7.2 6.4 - 8.3 g/dL    Albumin 4.3 3.5 - 5.2 g/dL    Bilirubin Total 0.3 <=1.2 mg/dL    GFR Estimate >90 >60 mL/min/1.73m2   CBC with platelets and differential   Result Value Ref Range    WBC Count 5.6 4.0 - 11.0 10e3/uL    RBC Count 4.67 3.80 - 5.20 10e6/uL    Hemoglobin 14.0 11.7 - 15.7 g/dL    Hematocrit 41.2 35.0 - 47.0 %    MCV 88 78 - " 100 fL    MCH 30.0 26.5 - 33.0 pg    MCHC 34.0 31.5 - 36.5 g/dL    RDW 13.1 10.0 - 15.0 %    Platelet Count 217 150 - 450 10e3/uL    % Neutrophils 63 %    % Lymphocytes 26 %    % Monocytes 8 %    % Eosinophils 2 %    % Basophils 0 %    % Immature Granulocytes 1 %    NRBCs per 100 WBC 0 <1 /100    Absolute Neutrophils 3.5 1.6 - 8.3 10e3/uL    Absolute Lymphocytes 1.5 0.8 - 5.3 10e3/uL    Absolute Monocytes 0.5 0.0 - 1.3 10e3/uL    Absolute Eosinophils 0.1 0.0 - 0.7 10e3/uL    Absolute Basophils 0.0 0.0 - 0.2 10e3/uL    Absolute Immature Granulocytes 0.0 <=0.4 10e3/uL    Absolute NRBCs 0.0 10e3/uL      Imaging:    No results found.      Signed by: Elia Olsen MD

## 2023-07-17 DIAGNOSIS — C50.311 MALIGNANT NEOPLASM OF LOWER-INNER QUADRANT OF RIGHT BREAST OF FEMALE, ESTROGEN RECEPTOR NEGATIVE (H): ICD-10-CM

## 2023-07-17 DIAGNOSIS — Z17.1 MALIGNANT NEOPLASM OF LOWER-INNER QUADRANT OF RIGHT BREAST OF FEMALE, ESTROGEN RECEPTOR NEGATIVE (H): ICD-10-CM

## 2023-07-17 RX ORDER — TAMOXIFEN CITRATE 20 MG/1
20 TABLET ORAL DAILY
Qty: 90 TABLET | Refills: 1 | Status: SHIPPED | OUTPATIENT
Start: 2023-07-17 | End: 2024-01-24

## 2023-10-10 ENCOUNTER — PATIENT OUTREACH (OUTPATIENT)
Dept: CARE COORDINATION | Facility: CLINIC | Age: 48
End: 2023-10-10
Payer: COMMERCIAL

## 2023-11-10 DIAGNOSIS — I10 ESSENTIAL HYPERTENSION: ICD-10-CM

## 2023-11-10 NOTE — TELEPHONE ENCOUNTER
Reason for Call:  Medication or medication refill:    Do you use a [facility]? [place] of the pharmacy and phone number for the current request:  [facility]     Name of the medication requested: lisinopril    Next visit with PCP: 11/22/2023    Call taken on 11/10/2023 at 2:23 PM by Willa Johnson

## 2023-11-12 RX ORDER — LISINOPRIL 20 MG/1
20 TABLET ORAL DAILY
Qty: 90 TABLET | Refills: 3 | Status: SHIPPED | OUTPATIENT
Start: 2023-11-12

## 2023-11-14 ENCOUNTER — TELEPHONE (OUTPATIENT)
Dept: ONCOLOGY | Facility: HOSPITAL | Age: 48
End: 2023-11-14
Payer: COMMERCIAL

## 2023-11-22 ENCOUNTER — OFFICE VISIT (OUTPATIENT)
Dept: INTERNAL MEDICINE | Facility: CLINIC | Age: 48
End: 2023-11-22
Payer: COMMERCIAL

## 2023-11-22 VITALS
WEIGHT: 130.4 LBS | BODY MASS INDEX: 23.11 KG/M2 | HEIGHT: 63 IN | HEART RATE: 81 BPM | OXYGEN SATURATION: 94 % | DIASTOLIC BLOOD PRESSURE: 62 MMHG | RESPIRATION RATE: 20 BRPM | SYSTOLIC BLOOD PRESSURE: 116 MMHG | TEMPERATURE: 98.1 F

## 2023-11-22 DIAGNOSIS — Z11.59 NEED FOR HEPATITIS C SCREENING TEST: ICD-10-CM

## 2023-11-22 DIAGNOSIS — G62.0 DRUG-INDUCED POLYNEUROPATHY (H): ICD-10-CM

## 2023-11-22 DIAGNOSIS — Z00.00 ENCOUNTER FOR ANNUAL PHYSICAL EXAM: Primary | ICD-10-CM

## 2023-11-22 DIAGNOSIS — C50.311 MALIGNANT NEOPLASM OF LOWER-INNER QUADRANT OF RIGHT BREAST OF FEMALE, ESTROGEN RECEPTOR NEGATIVE (H): ICD-10-CM

## 2023-11-22 DIAGNOSIS — I10 ESSENTIAL HYPERTENSION: ICD-10-CM

## 2023-11-22 DIAGNOSIS — Z11.4 SCREENING FOR HIV (HUMAN IMMUNODEFICIENCY VIRUS): ICD-10-CM

## 2023-11-22 DIAGNOSIS — E78.1 HYPERTRIGLYCERIDEMIA: ICD-10-CM

## 2023-11-22 DIAGNOSIS — Z17.1 MALIGNANT NEOPLASM OF LOWER-INNER QUADRANT OF RIGHT BREAST OF FEMALE, ESTROGEN RECEPTOR NEGATIVE (H): ICD-10-CM

## 2023-11-22 DIAGNOSIS — Z12.11 SCREEN FOR COLON CANCER: ICD-10-CM

## 2023-11-22 DIAGNOSIS — R23.8 OTHER SKIN CHANGES: ICD-10-CM

## 2023-11-22 DIAGNOSIS — L98.9 SKIN SORE: ICD-10-CM

## 2023-11-22 PROCEDURE — 99396 PREV VISIT EST AGE 40-64: CPT | Performed by: NURSE PRACTITIONER

## 2023-11-22 RX ORDER — MUPIROCIN CALCIUM 20 MG/G
CREAM TOPICAL 3 TIMES DAILY
Qty: 30 G | Refills: 0 | Status: SHIPPED | OUTPATIENT
Start: 2023-11-22

## 2023-11-22 ASSESSMENT — PATIENT HEALTH QUESTIONNAIRE - PHQ9
10. IF YOU CHECKED OFF ANY PROBLEMS, HOW DIFFICULT HAVE THESE PROBLEMS MADE IT FOR YOU TO DO YOUR WORK, TAKE CARE OF THINGS AT HOME, OR GET ALONG WITH OTHER PEOPLE: NOT DIFFICULT AT ALL
SUM OF ALL RESPONSES TO PHQ QUESTIONS 1-9: 1
SUM OF ALL RESPONSES TO PHQ QUESTIONS 1-9: 1

## 2023-11-22 ASSESSMENT — ENCOUNTER SYMPTOMS
BREAST MASS: 0
SORE THROAT: 0
HEMATURIA: 0
PARESTHESIAS: 1
HEARTBURN: 0
HEMATOCHEZIA: 0
ABDOMINAL PAIN: 0
JOINT SWELLING: 0
CHILLS: 0
SHORTNESS OF BREATH: 0
HEADACHES: 0
NAUSEA: 0
EYE PAIN: 0
DIZZINESS: 0
COUGH: 0
FEVER: 0
ARTHRALGIAS: 0
NERVOUS/ANXIOUS: 0
DYSURIA: 0
FREQUENCY: 0
DIARRHEA: 0
PALPITATIONS: 0
WEAKNESS: 0
CONSTIPATION: 0
MYALGIAS: 0

## 2023-11-22 ASSESSMENT — ANXIETY QUESTIONNAIRES
IF YOU CHECKED OFF ANY PROBLEMS ON THIS QUESTIONNAIRE, HOW DIFFICULT HAVE THESE PROBLEMS MADE IT FOR YOU TO DO YOUR WORK, TAKE CARE OF THINGS AT HOME, OR GET ALONG WITH OTHER PEOPLE: NOT DIFFICULT AT ALL
GAD7 TOTAL SCORE: 0
3. WORRYING TOO MUCH ABOUT DIFFERENT THINGS: NOT AT ALL
7. FEELING AFRAID AS IF SOMETHING AWFUL MIGHT HAPPEN: NOT AT ALL
5. BEING SO RESTLESS THAT IT IS HARD TO SIT STILL: NOT AT ALL
GAD7 TOTAL SCORE: 0
1. FEELING NERVOUS, ANXIOUS, OR ON EDGE: NOT AT ALL
2. NOT BEING ABLE TO STOP OR CONTROL WORRYING: NOT AT ALL
6. BECOMING EASILY ANNOYED OR IRRITABLE: NOT AT ALL
4. TROUBLE RELAXING: NOT AT ALL

## 2023-11-22 NOTE — PROGRESS NOTES
SUBJECTIVE:   Alice is a 48 year old, presenting for the following:  Physical        2023     1:51 PM   Additional Questions   Roomed by Meena       Healthy Habits:     Getting at least 3 servings of Calcium per day:  Yes    Bi-annual eye exam:  NO    Dental care twice a year:  Yes    Sleep apnea or symptoms of sleep apnea:  Daytime drowsiness    Diet:  Regular (no restrictions)    Frequency of exercise:  1 day/week    Duration of exercise:  Less than 15 minutes    Taking medications regularly:  Yes    Medication side effects:  Significant flushing    Additional concerns today:  No      Today's PHQ-9 Score:       2023     1:34 PM   PHQ-9 SCORE   PHQ-9 Total Score MyChart 1 (Minimal depression)   PHQ-9 Total Score 1     Social History     Tobacco Use    Smoking status: Never    Smokeless tobacco: Never   Substance Use Topics    Alcohol use: No             2023     1:38 PM   Alcohol Use   Prescreen: >3 drinks/day or >7 drinks/week? Not Applicable     Reviewed orders with patient.  Reviewed health maintenance and updated orders accordingly - Yes  BP Readings from Last 3 Encounters:   23 116/62   23 118/76   22 107/63    Wt Readings from Last 3 Encounters:   23 59.1 kg (130 lb 6.4 oz)   23 59.6 kg (131 lb 4.8 oz)   22 60 kg (132 lb 4.8 oz)                  Patient Active Problem List   Diagnosis    HTN (hypertension)    Nonsmoker    Malignant neoplasm of lower-inner quadrant of right breast of female, estrogen receptor negative (H)    Screening for cervical cancer    Neck mass     Past Surgical History:   Procedure Laterality Date     SECTION      EXCISE MASS NECK Left 2022    lipoma    DC INSJ PRPH CTR VAD W/SUBQ PORT AGE 5 YR/> Left 10/08/2020    Procedure: Port Placement;  Surgeon: Kimberly Will MD;  Location: [facility];  Service: General    DC MASTECTOMY, SIMPLE, COMPLETE Bilateral 2021    Procedure: Bilateral Mastectomies;  Right [place] Lymph Node Biopsy; Port Removal;  Surgeon: Kimberly Will MD;  Location: [facility];  Service: General    US BREAST CLIP PLACEMENT RIGHT Right 10/08/2020    US SENTINEL NODE INJECTION  03/24/2021       Social History     Tobacco Use    Smoking status: Never    Smokeless tobacco: Never   Substance Use Topics    Alcohol use: No     Family History   Problem Relation Age of Onset    Hypertension Mother     Hypertension Father     Cancer Maternal Grandmother 70        unknown-abdominal?    Cancer Maternal Grandfather 70        liver?         Current Outpatient Medications   Medication Sig Dispense Refill    Ascorbic Acid (VITAMIN C) 100 MG CHEW       lisinopril (ZESTRIL) 20 MG tablet Take 1 tablet (20 mg) by mouth daily 90 tablet 3    multivitamin w/minerals (THERA-VIT-M) tablet Take 1 tablet by mouth daily      tamoxifen (NOLVADEX) 20 MG tablet Take 1 tablet (20 mg) by mouth daily 90 tablet 1     Allergies   Allergen Reactions    Betamethasone Dipropionate (Augmented) [Betamethasone]     Sulfa (Sulfonamide Antibiotics) [Sulfa Antibiotics] Unknown    Latex Itching     Recent Labs   Lab Test 05/26/23  0826 12/08/22  1329 11/09/22  1249 01/13/22  0944 11/10/21  0937 10/15/21  0858 06/16/21  0917 03/03/21  0953 10/09/20  0840 01/23/20  0929 11/01/18  0901 08/01/16  0854   A1C  --   --   --   --  5.3  --   --   --   --   --   --   --    LDL  --   --   --   --   --   --   --   --   --  85  --  72   HDL  --   --  24*  --   --   --   --   --   --  35*  --  33*   TRIG  --   --  742*  --  2,029* 1,937*  --   --   --  347*  --  320*   ALT 44* 36* 39*   < >  --  54* 32 46*   < >  --   --   --    CR 0.67 0.63 0.67   < >  --  0.72 0.74 0.72   < > 0.69   < >  --    GFRESTIMATED >90 >90 >90   < >  --  >90 >60 >60   < > >60   < >  --    GFRESTBLACK  --   --   --   --   --   --  >60 >60   < > >60   < >  --    POTASSIUM 4.0 3.8 3.8   < >  --  3.5 3.9 3.8   < > 4.2   < >  --    TSH  --   --  3.44  --   --  5.44*  --    --   --  <0.01*   < >  --     < > = values in this interval not displayed.        Breast Cancer Screenin/10/2021     9:19 AM   Breast CA Risk Assessment (FHS-7)   Do you have a family history of breast, colon, or ovarian cancer? No / Unknown         Mammogram Screening - Alternate mammogram schedule due to breast cancer history  Pertinent mammograms are reviewed under the imaging tab.    History of abnormal Pap smear: Last 3 Pap and HPV Results:       Latest Ref Rng & Units 11/10/2021     9:14 AM   PAP / HPV   PAP  Negative for Intraepithelial Lesion or Malignancy (NILM)    HPV 16 DNA Negative Negative    HPV 18 DNA Negative Negative    Other HR HPV Negative Negative          Latest Ref Rng & Units 11/10/2021     9:14 AM   PAP / HPV   PAP  Negative for Intraepithelial Lesion or Malignancy (NILM)    HPV 16 DNA Negative Negative    HPV 18 DNA Negative Negative    Other HR HPV Negative Negative      Reviewed and updated as needed this visit by clinical staff   Tobacco  Allergies  Meds              Reviewed and updated as needed this visit by Provider                 Past Medical History:   Diagnosis Date    Breast cancer (H)     stage IIB, ER+, WI neg, HER2 neg    History of anesthesia complications     Slow to wake up    HTN (hypertension)     Nonsmoker     Thyroid nodule       Past Surgical History:   Procedure Laterality Date     SECTION  2010    EXCISE MASS NECK Left 2022    lipoma    WI INSJ PRPH CTR VAD W/SUBQ PORT AGE 5 YR/> Left 10/08/2020    Procedure: Port Placement;  Surgeon: Kimberly Will MD;  Location: [facility];  Service: General    WI MASTECTOMY, SIMPLE, COMPLETE Bilateral 2021    Procedure: Bilateral Mastectomies; Right [place] Lymph Node Biopsy; Port Removal;  Surgeon: Kimberly Will MD;  Location: [facility];  Service: General    US BREAST CLIP PLACEMENT RIGHT Right 10/08/2020    US SENTINEL NODE INJECTION  2021     OB History  "   Para Term  AB Living   1 1 1 0 0 1   SAB IAB Ectopic Multiple Live Births   0 0 0 0 0      # Outcome Date GA Lbr Mike/2nd Weight Sex Delivery Anes PTL Lv   1 Term                Review of Systems   Constitutional:  Negative for chills and fever.   HENT:  Negative for congestion, ear pain, hearing loss and sore throat.    Eyes:  Negative for pain and visual disturbance.   Respiratory:  Negative for cough and shortness of breath.    Cardiovascular:  Negative for chest pain, palpitations and peripheral edema.   Gastrointestinal:  Negative for abdominal pain, constipation, diarrhea, heartburn, hematochezia and nausea.   Breasts:  Negative for tenderness, breast mass and discharge.   Genitourinary:  Negative for dysuria, frequency, genital sores, hematuria, pelvic pain, urgency, vaginal bleeding and vaginal discharge.   Musculoskeletal:  Negative for arthralgias, joint swelling and myalgias.   Skin:  Positive for rash.   Neurological:  Positive for paresthesias. Negative for dizziness, weakness and headaches.   Psychiatric/Behavioral:  Negative for mood changes. The patient is not nervous/anxious.           OBJECTIVE:   /62 (BP Location: Right arm, Patient Position: Sitting, Cuff Size: Adult Regular)   Pulse 81   Temp 98.1  F (36.7  C) (Oral)   Resp 20   Ht 1.6 m (5' 3\")   Wt 59.1 kg (130 lb 6.4 oz)   SpO2 94%   BMI 23.10 kg/m    Physical Exam  GENERAL: healthy, alert and no distress  EYES: Eyes grossly normal to inspection, PERRL and conjunctivae and sclerae normal  HENT: ear canals and TM's normal, nose and mouth without ulcers or lesions  NECK: no adenopathy   RESP: lungs clear to auscultation - no rales, rhonchi or wheezes  CV: regular rate and rhythm, normal S1 S2, no S3 or S4, no murmur, click or rub, no peripheral edema and peripheral pulses strong  ABDOMEN: soft, nontender, no hepatosplenomegaly, no masses and bowel sounds normal  MS: no gross musculoskeletal defects noted, no " edema  SKIN: two erythematous lesions bilateral cheeks, left crevice of lips   NEURO: Normal strength and tone, mentation intact and speech normal  PSYCH: mentation appears normal, affect normal/bright        ASSESSMENT/PLAN:     Problem List Items Addressed This Visit          Other    Malignant neoplasm of lower-inner quadrant of right breast of female, estrogen receptor negative (H)     Other Visit Diagnoses       Encounter for annual physical exam    -  Primary    Relevant Orders    Lipid Profile (Chol, Trig, HDL, LDL calc)    TSH with free T4 reflex    UA Macroscopic with reflex to Microscopic and Culture - Lab Collect    Screen for colon cancer        Relevant Orders    Colonoscopy Screening  Referral    Screening for HIV (human immunodeficiency virus)        Need for hepatitis C screening test        Hypertriglyceridemia        Essential hypertension        Drug-induced polyneuropathy (H24)            Patient has been sensitivity to gluten he does attempt to adjust her diet accordingly.  She is due for colonoscopy and a referral will be placed.    Patient's chronic conditions remained stable with recommendation for no changes at this time.    She does have a history of neuropathy bilateral feet thought to be secondary to chemotherapy.  Recommended EMG patient declined this time if she does change her mind she may contact my office.    She has 2 skin lesions on bilateral cheeks as well as in the crevice of the left side of her mouth recommend low-dose steroid for her cheeks and order Bactroban for her near her mouth.  A referral was also made to dermatology.    Patient declines additional vaccinations as offered today.  She will follow-up in 1 years time she will complete her lab work next week when being seen by oncology.  All patient's questions addressed verbalized proceeding with plan.          COUNSELING:  Reviewed preventive health counseling, as reflected in patient instructions    Wt Readings  from Last 5 Encounters:   11/22/23 59.1 kg (130 lb 6.4 oz)   05/26/23 59.6 kg (131 lb 4.8 oz)   12/08/22 60 kg (132 lb 4.8 oz)   11/15/22 59 kg (130 lb)   11/09/22 58.9 kg (129 lb 12.8 oz)         She reports that she has never smoked. She has never used smokeless tobacco.          Cindy Michael NP  [facility]  Answers submitted by the patient for this visit:  Patient Health Questionnaire (Submitted on 11/22/2023)  If you checked off any problems, how difficult have these problems made it for you to do your work, take care of things at home, or get along with other people?: Not difficult at all  PHQ9 TOTAL SCORE: 1  JAYDEN-7 (Submitted on 11/22/2023)  JAYDEN 7 TOTAL SCORE: 0

## 2023-12-01 ENCOUNTER — ONCOLOGY VISIT (OUTPATIENT)
Dept: ONCOLOGY | Facility: HOSPITAL | Age: 48
End: 2023-12-01
Attending: INTERNAL MEDICINE
Payer: COMMERCIAL

## 2023-12-01 ENCOUNTER — LAB (OUTPATIENT)
Dept: INFUSION THERAPY | Facility: HOSPITAL | Age: 48
End: 2023-12-01
Attending: INTERNAL MEDICINE
Payer: COMMERCIAL

## 2023-12-01 VITALS
OXYGEN SATURATION: 100 % | HEART RATE: 71 BPM | TEMPERATURE: 98.6 F | SYSTOLIC BLOOD PRESSURE: 144 MMHG | DIASTOLIC BLOOD PRESSURE: 76 MMHG | HEIGHT: 63 IN | BODY MASS INDEX: 22.75 KG/M2 | WEIGHT: 128.4 LBS

## 2023-12-01 DIAGNOSIS — Z17.1 MALIGNANT NEOPLASM OF LOWER-INNER QUADRANT OF RIGHT BREAST OF FEMALE, ESTROGEN RECEPTOR NEGATIVE (H): Primary | ICD-10-CM

## 2023-12-01 DIAGNOSIS — Z17.1 MALIGNANT NEOPLASM OF LOWER-INNER QUADRANT OF RIGHT BREAST OF FEMALE, ESTROGEN RECEPTOR NEGATIVE (H): ICD-10-CM

## 2023-12-01 DIAGNOSIS — Z79.810 LONG-TERM CURRENT USE OF TAMOXIFEN: ICD-10-CM

## 2023-12-01 DIAGNOSIS — C50.311 MALIGNANT NEOPLASM OF LOWER-INNER QUADRANT OF RIGHT BREAST OF FEMALE, ESTROGEN RECEPTOR NEGATIVE (H): Primary | ICD-10-CM

## 2023-12-01 DIAGNOSIS — Z00.00 ENCOUNTER FOR ANNUAL PHYSICAL EXAM: ICD-10-CM

## 2023-12-01 DIAGNOSIS — C50.311 MALIGNANT NEOPLASM OF LOWER-INNER QUADRANT OF RIGHT BREAST OF FEMALE, ESTROGEN RECEPTOR NEGATIVE (H): ICD-10-CM

## 2023-12-01 LAB
ALBUMIN SERPL BCG-MCNC: 4.8 G/DL (ref 3.5–5.2)
ALBUMIN UR-MCNC: 10 MG/DL
ALP SERPL-CCNC: 77 U/L (ref 40–150)
ALT SERPL W P-5'-P-CCNC: 47 U/L (ref 0–50)
ANION GAP SERPL CALCULATED.3IONS-SCNC: 9 MMOL/L (ref 7–15)
APPEARANCE UR: ABNORMAL
AST SERPL W P-5'-P-CCNC: 44 U/L (ref 0–45)
BACTERIA #/AREA URNS HPF: ABNORMAL /HPF
BASOPHILS # BLD AUTO: 0 10E3/UL (ref 0–0.2)
BASOPHILS NFR BLD AUTO: 1 %
BILIRUB SERPL-MCNC: 0.3 MG/DL
BILIRUB UR QL STRIP: NEGATIVE
BUN SERPL-MCNC: 12.6 MG/DL (ref 6–20)
CALCIUM SERPL-MCNC: 10.2 MG/DL (ref 8.6–10)
CHLORIDE SERPL-SCNC: 101 MMOL/L (ref 98–107)
CHOLEST SERPL-MCNC: 181 MG/DL
COLOR UR AUTO: ABNORMAL
CREAT SERPL-MCNC: 0.7 MG/DL (ref 0.51–0.95)
DEPRECATED HCO3 PLAS-SCNC: 30 MMOL/L (ref 22–29)
EGFRCR SERPLBLD CKD-EPI 2021: >90 ML/MIN/1.73M2
EOSINOPHIL # BLD AUTO: 0.1 10E3/UL (ref 0–0.7)
EOSINOPHIL NFR BLD AUTO: 2 %
ERYTHROCYTE [DISTWIDTH] IN BLOOD BY AUTOMATED COUNT: 13 % (ref 10–15)
GLUCOSE SERPL-MCNC: 106 MG/DL (ref 70–99)
GLUCOSE UR STRIP-MCNC: NEGATIVE MG/DL
HCT VFR BLD AUTO: 42.6 % (ref 35–47)
HDLC SERPL-MCNC: 27 MG/DL
HGB BLD-MCNC: 14.4 G/DL (ref 11.7–15.7)
HGB UR QL STRIP: NEGATIVE
HYALINE CASTS: 3 /LPF
IMM GRANULOCYTES # BLD: 0 10E3/UL
IMM GRANULOCYTES NFR BLD: 0 %
KETONES UR STRIP-MCNC: NEGATIVE MG/DL
LDLC SERPL CALC-MCNC: ABNORMAL MG/DL
LEUKOCYTE ESTERASE UR QL STRIP: ABNORMAL
LYMPHOCYTES # BLD AUTO: 1.3 10E3/UL (ref 0.8–5.3)
LYMPHOCYTES NFR BLD AUTO: 28 %
MCH RBC QN AUTO: 29.9 PG (ref 26.5–33)
MCHC RBC AUTO-ENTMCNC: 33.8 G/DL (ref 31.5–36.5)
MCV RBC AUTO: 88 FL (ref 78–100)
MONOCYTES # BLD AUTO: 0.5 10E3/UL (ref 0–1.3)
MONOCYTES NFR BLD AUTO: 11 %
NEUTROPHILS # BLD AUTO: 2.7 10E3/UL (ref 1.6–8.3)
NEUTROPHILS NFR BLD AUTO: 58 %
NITRATE UR QL: NEGATIVE
NONHDLC SERPL-MCNC: 154 MG/DL
NRBC # BLD AUTO: 0 10E3/UL
NRBC BLD AUTO-RTO: 0 /100
PH UR STRIP: 5 [PH] (ref 5–7)
PLATELET # BLD AUTO: 214 10E3/UL (ref 150–450)
POTASSIUM SERPL-SCNC: 4.1 MMOL/L (ref 3.4–5.3)
PROT SERPL-MCNC: 7.5 G/DL (ref 6.4–8.3)
RBC # BLD AUTO: 4.82 10E6/UL (ref 3.8–5.2)
RBC URINE: 12 /HPF
SODIUM SERPL-SCNC: 140 MMOL/L (ref 135–145)
SP GR UR STRIP: 1.02 (ref 1–1.03)
SQUAMOUS EPITHELIAL: 6 /HPF
T4 FREE SERPL-MCNC: 1.39 NG/DL (ref 0.9–1.7)
TRIGL SERPL-MCNC: 682 MG/DL
TSH SERPL DL<=0.005 MIU/L-ACNC: 6.51 UIU/ML (ref 0.3–4.2)
UROBILINOGEN UR STRIP-MCNC: <2 MG/DL
WBC # BLD AUTO: 4.7 10E3/UL (ref 4–11)
WBC URINE: 148 /HPF

## 2023-12-01 PROCEDURE — 99214 OFFICE O/P EST MOD 30 MIN: CPT | Performed by: NURSE PRACTITIONER

## 2023-12-01 PROCEDURE — 81001 URINALYSIS AUTO W/SCOPE: CPT

## 2023-12-01 PROCEDURE — 84443 ASSAY THYROID STIM HORMONE: CPT

## 2023-12-01 PROCEDURE — 87086 URINE CULTURE/COLONY COUNT: CPT

## 2023-12-01 PROCEDURE — 80061 LIPID PANEL: CPT

## 2023-12-01 PROCEDURE — 80053 COMPREHEN METABOLIC PANEL: CPT

## 2023-12-01 PROCEDURE — 84439 ASSAY OF FREE THYROXINE: CPT

## 2023-12-01 PROCEDURE — 85004 AUTOMATED DIFF WBC COUNT: CPT

## 2023-12-01 PROCEDURE — 36415 COLL VENOUS BLD VENIPUNCTURE: CPT

## 2023-12-01 RX ORDER — GLUCOSAMINE/D3/BOSWELLIA SERRA 1500MG-400
1 TABLET ORAL
COMMUNITY

## 2023-12-01 ASSESSMENT — PAIN SCALES - GENERAL: PAINLEVEL: NO PAIN (1)

## 2023-12-01 NOTE — PROGRESS NOTES
[facility] Progress Note    Patient: Jes Mistry  MRN: [id number]  Date of Service: Dec 1, 2023         Reason for Visit:    Scheduled f/up    Assessment and Plan:    Primary oncologist - Dr Olsen    Clinical Stage IIB, G2, ER+, [place]-, HER2-, invasive ductal carcinoma LIQ (R) breast ... going on 3 years s/p (B) mastectomies, preceded by neoadjuvant chemotherapy, on adjuvant antoestrogen therapy with tamoxifen.  48 year old   Alice presents alone.  Overall, she is looking and feeling quite good.  She continue her FT employment.  Excellent PS.  No concerning tamoxifen s/e's.  Appetite good - weight very stable the past couple years.  No concerning vaginal discharge.  Denies cough, fever, chills, unusual headaches, visual or mentation disturbance, bowel or bladder issues, rash.  CBC - WNL.  CMP - trace intermittent elevation calcium.  Otherwise basically WNL.    Encouraged good hydration prior to lab work.  Not on a calcium supplement, buts eats a lot of calcium-rich foods.  Clinically stable/ITZ going on 3 years s/p (B) mastectomies, preceded by neoadjuvant chemotherapy, on adjuvant tamoxifen for a clinical Stage IIB, G2, ER+, KY-, HER2-, invasive ductal carcinoma LIQ (R) breast.  Tolerating tamoxifen without concerning side effects.  Continue daily tamoxifen endocrine therapy.  Yearly P&P while on tamoxifen therapy.  6 month clinical f/up.  No imaging.  Yearly LFTs while on tamoxifen therapy.     Oncologic History:    G2, ER+, KY-, HER2-, invasive ductal carcinoma LIQ (R) breast         Clinical Stage IIB (cT2, cN0, cM0, G3, ER-, KY-, HER2-)       Pathologic stage (ypT1b, pN0, cM0, G2, ER+, KY-, HER2-)     2020, September - diagnosed   Estrogen receptor positive in 1%.  KY and HER-2 negative.    Measured 24 x 13 x 19 mm on ultrasound.   Ultrasound of the axilla was negative.  2 sentinel nodes are negative.     02/18/2021 - completed neoadjuvant chemotherapy with Adriamycin +  Cytoxan followed by weekly Taxol.    2021 (B) mastectomies, no implants   (R) mastectomy final pathology showed residual invasive ductal carcinoma, grade 2, 6 x 6 x 4 mm.  Negative margins.  ER positive at 30%.  HER-2/emily negative by FISH.     - began adjuvant anti-estrogen therapy with Tamoxifen.    ECOG Performance:    0 - Independent    Pain:    Pain Score: No Pain (1)  Pain Loc: Chest (left chest to upper back)    Encounter Diagnoses:    Problem List Items Addressed This Visit       Malignant neoplasm of lower-inner quadrant of right breast of female, estrogen receptor negative (H) - Primary     Other Visit Diagnoses       Long-term current use of tamoxifen                   32 minutes of time were spent on the date of this encounter with chart review, face to face time with the patient, examination, recommendations, documentation, and communication of the plan of care to the care team.     Lowell Butler, CNP     CC: Cindy Michael NP   ______________________________________________________________________________    Review of Systems:    No fever or night sweats.  No loss of weight.  No lumps or bumps anywhere.  No unusual headaches or eyesight issues.  No dizziness.  No bleeding from the nose.  No sores in the mouth. No problems with swallowing.  No chest pain. No shortness of breath. No cough.  No abdominal pain. No nausea or vomiting.  No diarrhea or constipation.  No blood in stool or black colored stools.  No problems passing urine.  No numbness or tingling in hands or feet.  No skin rashes.    A 14 point review of systems is otherwise negative.    Past History:    Past Medical History:   Diagnosis Date    Breast cancer (H)     stage IIB, ER+, WI neg, HER2 neg    History of anesthesia complications     Slow to wake up    HTN (hypertension)     Nonsmoker     Thyroid nodule        Past Surgical History:   Procedure Laterality Date     SECTION  2010    EXCISE MASS NECK Left  "01/27/2022    lipoma    WI INSJ [facility] CTR VAD W/SUBQ PORT AGE 5 YR/> Left 10/08/2020    Procedure: Port Placement;  Surgeon: Kimberly Will MD;  Location: [facility];  Service: General    WI MASTECTOMY, SIMPLE, COMPLETE Bilateral 03/24/2021    Procedure: Bilateral Mastectomies; Right [place] Lymph Node Biopsy; Port Removal;  Surgeon: Kimberly Will MD;  Location: [facility];  Service: General    US BREAST CLIP PLACEMENT RIGHT Right 10/08/2020    US SENTINEL NODE INJECTION  03/24/2021     Physical Exam:    BP (!) 144/76 (BP Location: Left arm, Patient Position: Sitting, Cuff Size: Adult Regular)   Pulse 71   Temp 98.6  F (37  C) (Oral)   Ht 1.6 m (5' 2.99\")   Wt 58.2 kg (128 lb 6.4 oz)   SpO2 100%   BMI 22.75 kg/m      GENERAL:   Alert and oriented. Seated comfortably. In no distress.    HEENT:   Atraumatic and normocephalic.  ANGEL.  EOMI.  No pallor.  No icterus.  No mucosal lesions.    LYMPH NODES:  No palpable cervical, axillary or inguinal lymphadenopathy.    CHEST:   Lungs clear to auscultation bilaterally.  C/W bilateral mastectomies - no concerning nodules, skin or axillary findings.    CVS:    S1 and S2 heard. Regular rate and rhythm.  No murmur or gallop or rub heard.  No peripheral edema.    ABDOMEN:   Soft. Not tender. Not distended.  No palpable hepatomegaly or splenomegaly, masses or ascites    EXTREMITIES:  Warm.    SKIN:     No rash, or bruising or purpura noted.  Full head of hair.    Lab Results:    Reviewed with patient.    Recent Results (from the past 168 hour(s))   Lipid Profile (Chol, Trig, HDL, LDL calc)   Result Value Ref Range    Cholesterol 181 <200 mg/dL    Triglycerides 682 (H) <150 mg/dL    Direct Measure HDL 27 (L) >=50 mg/dL    LDL Cholesterol Calculated      Non HDL Cholesterol 154 (H) <130 mg/dL   TSH with free T4 reflex   Result Value Ref Range    TSH 6.51 (H) 0.30 - 4.20 uIU/mL   Comprehensive metabolic panel   Result Value Ref Range    Sodium 140 135 - 145 " mmol/L    Potassium 4.1 3.4 - 5.3 mmol/L    Carbon Dioxide (CO2) 30 (H) 22 - 29 mmol/L    Anion Gap 9 7 - 15 mmol/L    Urea Nitrogen 12.6 6.0 - 20.0 mg/dL    Creatinine 0.70 0.51 - 0.95 mg/dL    GFR Estimate >90 >60 mL/min/1.73m2    Calcium 10.2 (H) 8.6 - 10.0 mg/dL    Chloride 101 98 - 107 mmol/L    Glucose 106 (H) 70 - 99 mg/dL    Alkaline Phosphatase 77 40 - 150 U/L    AST 44 0 - 45 U/L    ALT 47 0 - 50 U/L    Protein Total 7.5 6.4 - 8.3 g/dL    Albumin 4.8 3.5 - 5.2 g/dL    Bilirubin Total 0.3 <=1.2 mg/dL   CBC with platelets and differential   Result Value Ref Range    WBC Count 4.7 4.0 - 11.0 10e3/uL    RBC Count 4.82 3.80 - 5.20 10e6/uL    Hemoglobin 14.4 11.7 - 15.7 g/dL    Hematocrit 42.6 35.0 - 47.0 %    MCV 88 78 - 100 fL    MCH 29.9 26.5 - 33.0 pg    MCHC 33.8 31.5 - 36.5 g/dL    RDW 13.0 10.0 - 15.0 %    Platelet Count 214 150 - 450 10e3/uL    % Neutrophils 58 %    % Lymphocytes 28 %    % Monocytes 11 %    % Eosinophils 2 %    % Basophils 1 %    % Immature Granulocytes 0 %    NRBCs per 100 WBC 0 <1 /100    Absolute Neutrophils 2.7 1.6 - 8.3 10e3/uL    Absolute Lymphocytes 1.3 0.8 - 5.3 10e3/uL    Absolute Monocytes 0.5 0.0 - 1.3 10e3/uL    Absolute Eosinophils 0.1 0.0 - 0.7 10e3/uL    Absolute Basophils 0.0 0.0 - 0.2 10e3/uL    Absolute Immature Granulocytes 0.0 <=0.4 10e3/uL    Absolute NRBCs 0.0 10e3/uL     Imaging:    No results found.  \

## 2023-12-01 NOTE — LETTER
12/1/2023         RE: Jes Mistry  [id number] [facility] [address]        Dear Colleague,    Thank you for referring your patient, Jes Mistry, to the [facility]. Please see a copy of my visit note below.    [facility] Hematology and Oncology Progress Note    Patient: Jes Mistry  MRN: [id number]  Date of Service: Dec 1, 2023         Reason for Visit:    Scheduled f/up    Assessment and Plan:    Primary oncologist - Dr Olsen    Clinical Stage IIB, G2, ER+, NV-, HER2-, invasive ductal carcinoma LIQ (R) breast ... going on 3 years s/p (B) mastectomies, preceded by neoadjuvant chemotherapy, on adjuvant antoestrogen therapy with tamoxifen.  48 year old   Alice presents alone.  Overall, she is looking and feeling quite good.  She continue her FT employment.  Excellent PS.  No concerning tamoxifen s/e's.  Appetite good - weight very stable the past couple years.  No concerning vaginal discharge.  Denies cough, fever, chills, unusual headaches, visual or mentation disturbance, bowel or bladder issues, rash.  CBC - WNL.  CMP - trace intermittent elevation calcium.  Otherwise basically WNL.    Encouraged good hydration prior to lab work.  Not on a calcium supplement, buts eats a lot of calcium-rich foods.  Clinically stable/ITZ going on 3 years s/p (B) mastectomies, preceded by neoadjuvant chemotherapy, on adjuvant tamoxifen for a clinical Stage IIB, G2, ER+, NV-, HER2-, invasive ductal carcinoma LIQ (R) breast.  Tolerating tamoxifen without concerning side effects.  Continue daily tamoxifen endocrine therapy.  Yearly P&P while on tamoxifen therapy.  6 month clinical f/up.  No imaging.  Yearly LFTs while on tamoxifen therapy.     Oncologic History:    G2, ER+, NV-, HER2-, invasive ductal carcinoma LIQ (R) breast         Clinical Stage IIB (cT2, cN0, cM0, G3, ER-, NV-, HER2-)       Pathologic stage (ypT1b, pN0, cM0, G2, ER+, NV-, HER2-)     2020,  September - diagnosed   Estrogen receptor positive in 1%.  MO and HER-2 negative.    Measured 24 x 13 x 19 mm on ultrasound.   Ultrasound of the axilla was negative.  2 sentinel nodes are negative.     02/18/2021 - completed neoadjuvant chemotherapy with Adriamycin + Cytoxan followed by weekly Taxol.    03/21/2021 (B) mastectomies, no implants   (R) mastectomy final pathology showed residual invasive ductal carcinoma, grade 2, 6 x 6 x 4 mm.  Negative margins.  ER positive at 30%.  HER-2/emily negative by FISH.    2021, April - began adjuvant anti-estrogen therapy with Tamoxifen.    ECOG Performance:    0 - Independent    Pain:    Pain Score: No Pain (1)  Pain Loc: Chest (left chest to upper back)    Encounter Diagnoses:    Problem List Items Addressed This Visit       Malignant neoplasm of lower-inner quadrant of right breast of female, estrogen receptor negative (H) - Primary     Other Visit Diagnoses       Long-term current use of tamoxifen                   32 minutes of time were spent on the date of this encounter with chart review, face to face time with the patient, examination, recommendations, documentation, and communication of the plan of care to the care team.     Lowell Butler, CNP     CC: Cindy Michael NP   ______________________________________________________________________________    Review of Systems:    No fever or night sweats.  No loss of weight.  No lumps or bumps anywhere.  No unusual headaches or eyesight issues.  No dizziness.  No bleeding from the nose.  No sores in the mouth. No problems with swallowing.  No chest pain. No shortness of breath. No cough.  No abdominal pain. No nausea or vomiting.  No diarrhea or constipation.  No blood in stool or black colored stools.  No problems passing urine.  No numbness or tingling in hands or feet.  No skin rashes.    A 14 point review of systems is otherwise negative.    Past History:    Past Medical History:   Diagnosis Date     Breast cancer (H)  "    stage IIB, ER+, CO neg, HER2 neg     History of anesthesia complications     Slow to wake up     HTN (hypertension)      Nonsmoker      Thyroid nodule        Past Surgical History:   Procedure Laterality Date      SECTION  2010     EXCISE MASS NECK Left 2022    lipoma     CO INSJ PRPH CTR VAD W/SUBQ PORT AGE 5 YR/> Left 10/08/2020    Procedure: Port Placement;  Surgeon: Kimberly Will MD;  Location: [facility];  Service: General     CO MASTECTOMY, SIMPLE, COMPLETE Bilateral 2021    Procedure: Bilateral Mastectomies; Right Sanford Lymph Node Biopsy; Port Removal;  Surgeon: Kimberly Will MD;  Location: [facility];  Service: General     US BREAST CLIP PLACEMENT RIGHT Right 10/08/2020     US SENTINEL NODE INJECTION  2021     Physical Exam:    BP (!) 144/76 (BP Location: Left arm, Patient Position: Sitting, Cuff Size: Adult Regular)   Pulse 71   Temp 98.6  F (37  C) (Oral)   Ht 1.6 m (5' 2.99\")   Wt 58.2 kg (128 lb 6.4 oz)   SpO2 100%   BMI 22.75 kg/m      GENERAL:   Alert and oriented. Seated comfortably. In no distress.    HEENT:   Atraumatic and normocephalic.  ANGEL.  EOMI.  No pallor.  No icterus.  No mucosal lesions.    LYMPH NODES:  No palpable cervical, axillary or inguinal lymphadenopathy.    CHEST:   Lungs clear to auscultation bilaterally.  C/W bilateral mastectomies - no concerning nodules, skin or axillary findings.    CVS:    S1 and S2 heard. Regular rate and rhythm.  No murmur or gallop or rub heard.  No peripheral edema.    ABDOMEN:   Soft. Not tender. Not distended.  No palpable hepatomegaly or splenomegaly, masses or ascites    EXTREMITIES:  Warm.    SKIN:     No rash, or bruising or purpura noted.  Full head of hair.    Lab Results:    Reviewed with patient.    Recent Results (from the past 168 hour(s))   Lipid Profile (Chol, Trig, HDL, LDL calc)   Result Value Ref Range    Cholesterol 181 <200 mg/dL    Triglycerides 682 (H) <150 mg/dL    " Direct Measure HDL 27 (L) >=50 mg/dL    LDL Cholesterol Calculated      Non HDL Cholesterol 154 (H) <130 mg/dL   TSH with free T4 reflex   Result Value Ref Range    TSH 6.51 (H) 0.30 - 4.20 uIU/mL   Comprehensive metabolic panel   Result Value Ref Range    Sodium 140 135 - 145 mmol/L    Potassium 4.1 3.4 - 5.3 mmol/L    Carbon Dioxide (CO2) 30 (H) 22 - 29 mmol/L    Anion Gap 9 7 - 15 mmol/L    Urea Nitrogen 12.6 6.0 - 20.0 mg/dL    Creatinine 0.70 0.51 - 0.95 mg/dL    GFR Estimate >90 >60 mL/min/1.73m2    Calcium 10.2 (H) 8.6 - 10.0 mg/dL    Chloride 101 98 - 107 mmol/L    Glucose 106 (H) 70 - 99 mg/dL    Alkaline Phosphatase 77 40 - 150 U/L    AST 44 0 - 45 U/L    ALT 47 0 - 50 U/L    Protein Total 7.5 6.4 - 8.3 g/dL    Albumin 4.8 3.5 - 5.2 g/dL    Bilirubin Total 0.3 <=1.2 mg/dL   CBC with platelets and differential   Result Value Ref Range    WBC Count 4.7 4.0 - 11.0 10e3/uL    RBC Count 4.82 3.80 - 5.20 10e6/uL    Hemoglobin 14.4 11.7 - 15.7 g/dL    Hematocrit 42.6 35.0 - 47.0 %    MCV 88 78 - 100 fL    MCH 29.9 26.5 - 33.0 pg    MCHC 33.8 31.5 - 36.5 g/dL    RDW 13.0 10.0 - 15.0 %    Platelet Count 214 150 - 450 10e3/uL    % Neutrophils 58 %    % Lymphocytes 28 %    % Monocytes 11 %    % Eosinophils 2 %    % Basophils 1 %    % Immature Granulocytes 0 %    NRBCs per 100 WBC 0 <1 /100    Absolute Neutrophils 2.7 1.6 - 8.3 10e3/uL    Absolute Lymphocytes 1.3 0.8 - 5.3 10e3/uL    Absolute Monocytes 0.5 0.0 - 1.3 10e3/uL    Absolute Eosinophils 0.1 0.0 - 0.7 10e3/uL    Absolute Basophils 0.0 0.0 - 0.2 10e3/uL    Absolute Immature Granulocytes 0.0 <=0.4 10e3/uL    Absolute NRBCs 0.0 10e3/uL     Imaging:    No results found.  \      Again, thank you for allowing me to participate in the care of your patient.        Sincerely,        Lowell Butler, CNP

## 2023-12-01 NOTE — PATIENT INSTRUCTIONS
Recent Results (from the past 24 hour(s))   Lipid Profile (Chol, Trig, HDL, LDL calc)    Collection Time: 12/01/23  8:48 AM   Result Value Ref Range    Cholesterol 181 <200 mg/dL    Triglycerides 682 (H) <150 mg/dL    Direct Measure HDL 27 (L) >=50 mg/dL    LDL Cholesterol Calculated      Non HDL Cholesterol 154 (H) <130 mg/dL   TSH with free T4 reflex    Collection Time: 12/01/23  8:48 AM   Result Value Ref Range    TSH 6.51 (H) 0.30 - 4.20 uIU/mL   UA Macroscopic with reflex to Microscopic and Culture - Lab Collect    Collection Time: 12/01/23  8:48 AM    Specimen: Urine, NOS   Result Value Ref Range    Color Urine Light Yellow Colorless, Straw, Light Yellow, Yellow    Appearance Urine Turbid (A) Clear    Glucose Urine Negative Negative mg/dL    Bilirubin Urine Negative Negative    Ketones Urine Negative Negative mg/dL    Specific Gravity Urine 1.023 1.001 - 1.030    Blood Urine Negative Negative    pH Urine 5.0 5.0 - 7.0    Protein Albumin Urine 10 (A) Negative mg/dL    Urobilinogen Urine <2.0 <2.0 mg/dL    Nitrite Urine Negative Negative    Leukocyte Esterase Urine 500 Rupinder/uL (A) Negative    Bacteria Urine Few (A) None Seen /HPF    RBC Urine 12 (H) <=2 /HPF    WBC Urine 148 (H) <=5 /HPF    Squamous Epithelials Urine 6 (H) <=1 /HPF    Hyaline Casts Urine 3 (H) <=2 /LPF   Comprehensive metabolic panel    Collection Time: 12/01/23  8:50 AM   Result Value Ref Range    Sodium 140 135 - 145 mmol/L    Potassium 4.1 3.4 - 5.3 mmol/L    Carbon Dioxide (CO2) 30 (H) 22 - 29 mmol/L    Anion Gap 9 7 - 15 mmol/L    Urea Nitrogen 12.6 6.0 - 20.0 mg/dL    Creatinine 0.70 0.51 - 0.95 mg/dL    GFR Estimate >90 >60 mL/min/1.73m2    Calcium 10.2 (H) 8.6 - 10.0 mg/dL    Chloride 101 98 - 107 mmol/L    Glucose 106 (H) 70 - 99 mg/dL    Alkaline Phosphatase 77 40 - 150 U/L    AST 44 0 - 45 U/L    ALT 47 0 - 50 U/L    Protein Total 7.5 6.4 - 8.3 g/dL    Albumin 4.8 3.5 - 5.2 g/dL    Bilirubin Total 0.3 <=1.2 mg/dL   CBC with platelets  and differential    Collection Time: 12/01/23  8:50 AM   Result Value Ref Range    WBC Count 4.7 4.0 - 11.0 10e3/uL    RBC Count 4.82 3.80 - 5.20 10e6/uL    Hemoglobin 14.4 11.7 - 15.7 g/dL    Hematocrit 42.6 35.0 - 47.0 %    MCV 88 78 - 100 fL    MCH 29.9 26.5 - 33.0 pg    MCHC 33.8 31.5 - 36.5 g/dL    RDW 13.0 10.0 - 15.0 %    Platelet Count 214 150 - 450 10e3/uL    % Neutrophils 58 %    % Lymphocytes 28 %    % Monocytes 11 %    % Eosinophils 2 %    % Basophils 1 %    % Immature Granulocytes 0 %    NRBCs per 100 WBC 0 <1 /100    Absolute Neutrophils 2.7 1.6 - 8.3 10e3/uL    Absolute Lymphocytes 1.3 0.8 - 5.3 10e3/uL    Absolute Monocytes 0.5 0.0 - 1.3 10e3/uL    Absolute Eosinophils 0.1 0.0 - 0.7 10e3/uL    Absolute Basophils 0.0 0.0 - 0.2 10e3/uL    Absolute Immature Granulocytes 0.0 <=0.4 10e3/uL    Absolute NRBCs 0.0 10e3/uL

## 2023-12-02 LAB — BACTERIA UR CULT: NO GROWTH

## 2024-01-02 ENCOUNTER — VIRTUAL VISIT (OUTPATIENT)
Dept: PEDIATRICS | Facility: CLINIC | Age: 49
End: 2024-01-02
Payer: COMMERCIAL

## 2024-01-02 ENCOUNTER — NURSE TRIAGE (OUTPATIENT)
Dept: NURSING | Facility: CLINIC | Age: 49
End: 2024-01-02

## 2024-01-02 DIAGNOSIS — H10.31 ACUTE CONJUNCTIVITIS OF RIGHT EYE, UNSPECIFIED ACUTE CONJUNCTIVITIS TYPE: Primary | ICD-10-CM

## 2024-01-02 PROCEDURE — 99422 OL DIG E/M SVC 11-20 MIN: CPT | Mod: 95 | Performed by: PHYSICIAN ASSISTANT

## 2024-01-02 RX ORDER — POLYMYXIN B SULFATE AND TRIMETHOPRIM 1; 10000 MG/ML; [USP'U]/ML
1 SOLUTION OPHTHALMIC EVERY 4 HOURS
Qty: 10 ML | Refills: 0 | Status: SHIPPED | OUTPATIENT
Start: 2024-01-02 | End: 2024-01-07

## 2024-01-02 NOTE — PROGRESS NOTES
Alice is a 48 year old who is being evaluated via a billable video visit.      How would you like to obtain your AVS? MyChart  If the video visit is dropped, the invitation should be resent by:   Will anyone else be joining your video visit? No          Assessment & Plan       ICD-10-CM    1. Acute conjunctivitis of right eye, unspecified acute conjunctivitis type  H10.31 polymixin b-trimethoprim (POLYTRIM) 84923-5.1 UNIT/ML-% ophthalmic solution         Patient with right eye pink with mild crusting x1 day. Reports hx of frequent pink eye. Pt has had viral URI for the past week. Plan to send eye drop to retail pharmacy that she can  if symptoms worsen. Reviewed that it may be viral and drops won't help. She can continue with refresh eye drops as needed. Encouraged good hand hygiene and not to share anything that may contact her eyes such as wash clothes, towels etc.                  FAINA Yang [facility]    Subjective   Alice is a 48 year old, presenting for the following health issues:  Pink eye    Eye Problem        11/22/2023     1:51 PM   Additional Questions   Roomed by Meena       [facility]     Eye(s) Problem  Onset/Duration: 1 day  Description:   Location: Right eye  Pain: No  Redness: YES  Accompanying Signs & Symptoms:  Discharge/mattering: YES  Swelling: No  Visual changes: No  Fever: No  Nasal Congestion: YES  Bothered by bright lights: No  History:  Trauma: No  Foreign body exposure: No  Wearing contacts: No  Precipitating or alleviating factors: Has had a cold for the last week but symptoms are improving  Therapies tried and outcome: Some refresh saline eye drops        Review of Systems   CONSTITUTIONAL: NEGATIVE for fever, chills, change in weight  ENT/MOUTH: NEGATIVE for ear, mouth and throat problems. Positive for nasal congestion.  RESP: NEGATIVE for significant cough or SOB  CV: NEGATIVE for chest pain, palpitations or peripheral edema  ROS otherwise negative       Objective         Vitals:  No vitals were obtained today due to virtual visit.    Physical Exam   GENERAL: Healthy, alert and no distress  EYES: Eyes grossly normal to inspection. Right eye with mild erythema. No discharge during exam. Mild fullness of the lower lid. Left eye without erythema and discharge.  RESP: No audible wheeze, cough, or visible cyanosis.  No visible retractions or increased work of breathing.    SKIN: Visible skin clear. No significant rash, abnormal pigmentation or lesions.  NEURO: Cranial nerves grossly intact.  Mentation and speech appropriate for age.  PSYCH: Mentation appears normal, affect normal/bright, judgement and insight intact, normal speech and appearance well-groomed.                Video-Visit Details    Type of service:  Video Visit     Originating Location (pt. Location): Home    Distant Location (provider location):  On-site  Platform used for Video Visit: Solange

## 2024-01-02 NOTE — TELEPHONE ENCOUNTER
Pt calling with concerns about;    1/1/24 - began with Right eye itching, redness, watery  Pt reports having cold for last several days    Denies;  Yellow/green pus  Pain in eye  Light sensitivity    Per patient request, transferred to scheduling for virtual visit.  Care advice given. Patient verbalizes understanding and agrees with plan of care.     Ellen Wong RN, Nurse Advisor 9:15 AM 1/2/2024  Reason for Disposition   Red eye is part of a cold, and no eye pain or blurred vision    Additional Information   Negative: Chemical got in the eye   Negative: Piece of something got in the eye   Negative: Followed an eye injury   Negative: Yellow or green pus in the eyes   Negative: Eyelid is swollen and no redness of white of eye (sclera)   Negative: SEVERE eye pain   Negative: Recent eye surgery and has increasing eye pain   Negative: Patient sounds very sick or weak to the triager   Negative: MODERATE eye pain or discomfort (e.g., interferes with normal activities or awakens from sleep; more than mild)   Negative: Looking at light causes MODERATE to SEVERE eye pain (i.e., photophobia)   Negative: New or worsening blurred vision   Negative: Cloudy spot or sore seen on the cornea (clear part of the eye)   Negative: Eyelids are very swollen (shut or almost)   Negative: Eyelid (outer) is very red   Negative: Vomiting   Negative: Foreign body sensation ('feels like something is in there')   Negative: Patient wants to be seen   Negative: Eye pain present > 24 hours   Negative: Bleeding on white of the eye and is taking Coumadin or known bleeding disorder (e.g., thrombocytopenia)   Negative: Only 1 eye is red, and persists > 48 hours   Negative: Red eyes present > 7 days   Negative: Bleeding on white of the eye   Negative: Red eye caused by sunscreen, smoke, smog, chlorine, food, soap or other mild irritant   Negative: Red eye caused by contact lens, and no eye pain or blurred vision    Protocols used: Eye - Red Without  Pus-A-OH

## 2024-01-24 DIAGNOSIS — C50.311 MALIGNANT NEOPLASM OF LOWER-INNER QUADRANT OF RIGHT BREAST OF FEMALE, ESTROGEN RECEPTOR NEGATIVE (H): ICD-10-CM

## 2024-01-24 DIAGNOSIS — Z17.1 MALIGNANT NEOPLASM OF LOWER-INNER QUADRANT OF RIGHT BREAST OF FEMALE, ESTROGEN RECEPTOR NEGATIVE (H): ICD-10-CM

## 2024-01-24 RX ORDER — TAMOXIFEN CITRATE 20 MG/1
20 TABLET ORAL DAILY
Qty: 90 TABLET | Refills: 0 | Status: SHIPPED | OUTPATIENT
Start: 2024-01-24 | End: 2024-04-30

## 2024-04-30 DIAGNOSIS — Z17.1 MALIGNANT NEOPLASM OF LOWER-INNER QUADRANT OF RIGHT BREAST OF FEMALE, ESTROGEN RECEPTOR NEGATIVE (H): ICD-10-CM

## 2024-04-30 DIAGNOSIS — C50.311 MALIGNANT NEOPLASM OF LOWER-INNER QUADRANT OF RIGHT BREAST OF FEMALE, ESTROGEN RECEPTOR NEGATIVE (H): ICD-10-CM

## 2024-04-30 RX ORDER — TAMOXIFEN CITRATE 20 MG/1
20 TABLET ORAL DAILY
Qty: 90 TABLET | Refills: 0 | Status: SHIPPED | OUTPATIENT
Start: 2024-04-30 | End: 2024-08-05

## 2024-06-17 DIAGNOSIS — C50.311 MALIGNANT NEOPLASM OF LOWER-INNER QUADRANT OF RIGHT BREAST OF FEMALE, ESTROGEN RECEPTOR NEGATIVE (H): Primary | ICD-10-CM

## 2024-06-17 DIAGNOSIS — Z17.1 MALIGNANT NEOPLASM OF LOWER-INNER QUADRANT OF RIGHT BREAST OF FEMALE, ESTROGEN RECEPTOR NEGATIVE (H): Primary | ICD-10-CM

## 2024-06-26 ENCOUNTER — LAB (OUTPATIENT)
Dept: INFUSION THERAPY | Facility: HOSPITAL | Age: 49
End: 2024-06-26
Payer: COMMERCIAL

## 2024-06-26 ENCOUNTER — ONCOLOGY VISIT (OUTPATIENT)
Dept: ONCOLOGY | Facility: HOSPITAL | Age: 49
End: 2024-06-26
Payer: COMMERCIAL

## 2024-06-26 VITALS
DIASTOLIC BLOOD PRESSURE: 68 MMHG | HEART RATE: 58 BPM | OXYGEN SATURATION: 99 % | WEIGHT: 128.6 LBS | BODY MASS INDEX: 21.95 KG/M2 | HEIGHT: 64 IN | TEMPERATURE: 98.5 F | RESPIRATION RATE: 16 BRPM | SYSTOLIC BLOOD PRESSURE: 126 MMHG

## 2024-06-26 DIAGNOSIS — Z17.1 MALIGNANT NEOPLASM OF LOWER-INNER QUADRANT OF RIGHT BREAST OF FEMALE, ESTROGEN RECEPTOR NEGATIVE (H): ICD-10-CM

## 2024-06-26 DIAGNOSIS — Z17.1 MALIGNANT NEOPLASM OF LOWER-INNER QUADRANT OF RIGHT BREAST OF FEMALE, ESTROGEN RECEPTOR NEGATIVE (H): Primary | ICD-10-CM

## 2024-06-26 DIAGNOSIS — C50.311 MALIGNANT NEOPLASM OF LOWER-INNER QUADRANT OF RIGHT BREAST OF FEMALE, ESTROGEN RECEPTOR NEGATIVE (H): ICD-10-CM

## 2024-06-26 DIAGNOSIS — C50.311 MALIGNANT NEOPLASM OF LOWER-INNER QUADRANT OF RIGHT BREAST OF FEMALE, ESTROGEN RECEPTOR NEGATIVE (H): Primary | ICD-10-CM

## 2024-06-26 LAB
ALBUMIN SERPL BCG-MCNC: 4.4 G/DL (ref 3.5–5.2)
ALP SERPL-CCNC: 73 U/L (ref 40–150)
ALT SERPL W P-5'-P-CCNC: 38 U/L (ref 0–50)
ANION GAP SERPL CALCULATED.3IONS-SCNC: 10 MMOL/L (ref 7–15)
AST SERPL W P-5'-P-CCNC: 42 U/L (ref 0–45)
BASOPHILS # BLD AUTO: 0 10E3/UL (ref 0–0.2)
BASOPHILS NFR BLD AUTO: 1 %
BILIRUB SERPL-MCNC: 0.3 MG/DL
BUN SERPL-MCNC: 13.2 MG/DL (ref 6–20)
CALCIUM SERPL-MCNC: 9.6 MG/DL (ref 8.6–10)
CHLORIDE SERPL-SCNC: 100 MMOL/L (ref 98–107)
CREAT SERPL-MCNC: 0.7 MG/DL (ref 0.51–0.95)
DEPRECATED HCO3 PLAS-SCNC: 28 MMOL/L (ref 22–29)
EGFRCR SERPLBLD CKD-EPI 2021: >90 ML/MIN/1.73M2
EOSINOPHIL # BLD AUTO: 0.1 10E3/UL (ref 0–0.7)
EOSINOPHIL NFR BLD AUTO: 2 %
ERYTHROCYTE [DISTWIDTH] IN BLOOD BY AUTOMATED COUNT: 13 % (ref 10–15)
GLUCOSE SERPL-MCNC: 98 MG/DL (ref 70–99)
HCT VFR BLD AUTO: 40.9 % (ref 35–47)
HGB BLD-MCNC: 13.6 G/DL (ref 11.7–15.7)
IMM GRANULOCYTES # BLD: 0 10E3/UL
IMM GRANULOCYTES NFR BLD: 0 %
LYMPHOCYTES # BLD AUTO: 1.7 10E3/UL (ref 0.8–5.3)
LYMPHOCYTES NFR BLD AUTO: 31 %
MCH RBC QN AUTO: 29.5 PG (ref 26.5–33)
MCHC RBC AUTO-ENTMCNC: 33.3 G/DL (ref 31.5–36.5)
MCV RBC AUTO: 89 FL (ref 78–100)
MONOCYTES # BLD AUTO: 0.5 10E3/UL (ref 0–1.3)
MONOCYTES NFR BLD AUTO: 8 %
NEUTROPHILS # BLD AUTO: 3.2 10E3/UL (ref 1.6–8.3)
NEUTROPHILS NFR BLD AUTO: 58 %
NRBC # BLD AUTO: 0 10E3/UL
NRBC BLD AUTO-RTO: 0 /100
PLATELET # BLD AUTO: 225 10E3/UL (ref 150–450)
POTASSIUM SERPL-SCNC: 4.3 MMOL/L (ref 3.4–5.3)
PROT SERPL-MCNC: 7.6 G/DL (ref 6.4–8.3)
RBC # BLD AUTO: 4.61 10E6/UL (ref 3.8–5.2)
SODIUM SERPL-SCNC: 138 MMOL/L (ref 135–145)
WBC # BLD AUTO: 5.5 10E3/UL (ref 4–11)

## 2024-06-26 PROCEDURE — 99213 OFFICE O/P EST LOW 20 MIN: CPT | Performed by: INTERNAL MEDICINE

## 2024-06-26 PROCEDURE — 80053 COMPREHEN METABOLIC PANEL: CPT

## 2024-06-26 PROCEDURE — 36415 COLL VENOUS BLD VENIPUNCTURE: CPT

## 2024-06-26 PROCEDURE — 99214 OFFICE O/P EST MOD 30 MIN: CPT | Performed by: INTERNAL MEDICINE

## 2024-06-26 PROCEDURE — 85041 AUTOMATED RBC COUNT: CPT

## 2024-06-26 ASSESSMENT — PAIN SCALES - GENERAL: PAINLEVEL: MODERATE PAIN (4)

## 2024-06-26 NOTE — PROGRESS NOTES
"Oncology Rooming Note    June 26, 2024 9:44 AM   Jes Mistry is a 49 year old female who presents for:    Chief Complaint   Patient presents with    Oncology Clinic Visit     Return visit 6 months with lab. Malignant neoplasm of lower-inner quadrant of right breast of female, estrogen receptor negative.     Initial Vitals: /68 (BP Location: Left arm, Patient Position: Sitting, Cuff Size: Adult Regular)   Pulse 58   Temp 98.5  F (36.9  C) (Oral)   Resp 16   Ht 1.613 m (5' 3.5\")   Wt 58.3 kg (128 lb 9.6 oz)   SpO2 99%   BMI 22.42 kg/m   Estimated body mass index is 22.42 kg/m  as calculated from the following:    Height as of this encounter: 1.613 m (5' 3.5\").    Weight as of this encounter: 58.3 kg (128 lb 9.6 oz). Body surface area is 1.62 meters squared.  Moderate Pain (4) Comment: Data Unavailable   No LMP recorded. (Menstrual status: Chemotherapy).  Allergies reviewed: Yes  Medications reviewed: Yes    Medications: MEDICATION REFILLS NEEDED TODAY. Provider was notified.  Pharmacy name entered into [employer] Program: [id number] - [address]    Frailty Screening:   Is the patient here for a new oncology consult visit in cancer care? 2. No      Clinical concerns: right shoulder blade pain 4/10. Clicking in neck.  Dr Olsen was notified.      Aby Agee CMA              "

## 2024-06-26 NOTE — PROGRESS NOTES
[facility] Hematology and Oncology Progress Note    Patient: Jes Mistry  MRN: [id number]  Date of Service: Jun 26, 2024        Assessment and Plan:    Cancer Staging  Malignant neoplasm of lower-inner quadrant of right breast of female, estrogen receptor negative (H)  Staging form: Breast, AJCC 8th Edition  - Clinical: Stage IIB (cT2, cN0, cM0, G3, ER-, NJ-, HER2-) - Signed by Kaela Plunkett CNP on 10/9/2020     1.  Invasive ductal carcinoma: Continues on tamoxifen.  Seems to be tolerating it well.  No clinical signs or symptoms of recurrence.  She has some discomfort at the posterior right scapula.  Will see how this goes over the next few weeks.  If it does not resolve then we will get a CT scan of the chest.  Otherwise we will see her back in 6 months for follow-up.    ECOG Performance  0    Diagnosis:    1.  Carcinoma of the right breast: High-grade.  6 o'clock position.  Diagnosed September 2020. Estrogen receptor positive in 1%.  NJ and HER-2 negative.  Measured 24 x 13 x 19 mm on ultrasound. Ultrasound of the axilla was negative. 2 sentinel nodes are negative.    Treatment:    Neoadjuvant chemotherapy with Adriamycin and Cytoxan started October 9, 2020.  Weekly Taxol started December 3, 2020.     Bilateral mastectomy performed March 24, 2021: Final pathology shows residual invasive ductal carcinoma, grade 2, 6 x 6 x 4 mm.  Negative margins.  ER positive at 30%.  HER-2/emily negative by FISH.  Adjuvant Xeloda was not offered as her residual disease was estrogen receptor positive.    Tamoxifen initiated April 2021.    Interim History:    Alice is here today for a follow-up visit.  She remains on single agent tamoxifen.  Feeling okay.  Not correlated with any particular activity or position.  She has had some discomfort at the posterior right scapula over the past few weeks.  also notes decreased core strength.    Review of Systems:    As above in the history.     Review of Systems  "otherwise Negative for:  General: chills, fever or night sweats  Psychological: anxiety or depression  Ophthalmic: blurry vision, double vision or loss of vision, vision change  ENT: epistaxis, oral lesions, hearing changes  Hematological and Lymphatic: bleeding, bruising, jaundice, swollen lymph nodes  Endocrine: unexpected weight changes  Respiratory: cough, hemoptysis, orthopnea or shortness of breath/ACHARYA  Cardiovascular: chest pain, edema, palpitations or PND  Gastrointestinal: abdominal pain, blood in stools, change in bowel habits, constipation, nausea/vomiting  Genito-Urinary: change in urinary stream, incontinence, frequency/urgency  Musculoskeletal: joint pain, stiffness, swelling, muscle pain  Neurological: dizziness, headaches, numbness/tingling  Dermatological: lumps and rash    Past History:    Past Medical History:   Diagnosis Date    Breast cancer (H) 2020    stage IIB, ER+, NE neg, HER2 neg    History of anesthesia complications     Slow to wake up    HTN (hypertension)     Nonsmoker     Thyroid nodule 2011     Physical Exam:    /68 (BP Location: Left arm, Patient Position: Sitting, Cuff Size: Adult Regular)   Pulse 58   Temp 98.5  F (36.9  C) (Oral)   Resp 16   Ht 1.613 m (5' 3.5\")   Wt 58.3 kg (128 lb 9.6 oz)   SpO2 99%   BMI 22.42 kg/m      General: patient appears stated age of 46 year old. Nontoxic and in no distress.   HEENT: Head: atraumatic, normocephalic. Sclerae anicteric.  Chest:  Normal respiratory effort.  Thorough examination by palpation of each axilla revealed no mass or firmness.  Skin on the chest wall is normal with no nodule.  Cardiac:  No edema.   Abdomen: abdomen is non-distended  Extremities: normal tone and muscle bulk.  Skin: no lesions or rash on visible skin. Warm and dry.   CNS: alert and oriented. Grossly non-focal.   Psychiatric: normal mood and affect.     Lab Results:    Recent Results (from the past 168 hour(s))   Comprehensive metabolic panel   Result " Value Ref Range    Sodium 138 135 - 145 mmol/L    Potassium 4.3 3.4 - 5.3 mmol/L    Carbon Dioxide (CO2) 28 22 - 29 mmol/L    Anion Gap 10 7 - 15 mmol/L    Urea Nitrogen 13.2 6.0 - 20.0 mg/dL    Creatinine 0.70 0.51 - 0.95 mg/dL    GFR Estimate >90 >60 mL/min/1.73m2    Calcium 9.6 8.6 - 10.0 mg/dL    Chloride 100 98 - 107 mmol/L    Glucose 98 70 - 99 mg/dL    Alkaline Phosphatase 73 40 - 150 U/L    AST 42 0 - 45 U/L    ALT 38 0 - 50 U/L    Protein Total 7.6 6.4 - 8.3 g/dL    Albumin 4.4 3.5 - 5.2 g/dL    Bilirubin Total 0.3 <=1.2 mg/dL   CBC with platelets and differential   Result Value Ref Range    WBC Count 5.5 4.0 - 11.0 10e3/uL    RBC Count 4.61 3.80 - 5.20 10e6/uL    Hemoglobin 13.6 11.7 - 15.7 g/dL    Hematocrit 40.9 35.0 - 47.0 %    MCV 89 78 - 100 fL    MCH 29.5 26.5 - 33.0 pg    MCHC 33.3 31.5 - 36.5 g/dL    RDW 13.0 10.0 - 15.0 %    Platelet Count 225 150 - 450 10e3/uL    % Neutrophils 58 %    % Lymphocytes 31 %    % Monocytes 8 %    % Eosinophils 2 %    % Basophils 1 %    % Immature Granulocytes 0 %    NRBCs per 100 WBC 0 <1 /100    Absolute Neutrophils 3.2 1.6 - 8.3 10e3/uL    Absolute Lymphocytes 1.7 0.8 - 5.3 10e3/uL    Absolute Monocytes 0.5 0.0 - 1.3 10e3/uL    Absolute Eosinophils 0.1 0.0 - 0.7 10e3/uL    Absolute Basophils 0.0 0.0 - 0.2 10e3/uL    Absolute Immature Granulocytes 0.0 <=0.4 10e3/uL    Absolute NRBCs 0.0 10e3/uL     Imaging:    No results found.      Signed by: Elia Olsen MD

## 2024-07-05 ENCOUNTER — PATIENT OUTREACH (OUTPATIENT)
Dept: ONCOLOGY | Facility: HOSPITAL | Age: 49
End: 2024-07-05
Payer: COMMERCIAL

## 2024-07-05 NOTE — PROGRESS NOTES
Situation: Patient chart reviewed by care coordinator.      Plan/Recommendations: Patient saw Dr. Olsen on 6/26.  Follow up:  6 month follow up visit. RNCC to call in 2 weeks about shoulder pain. If it is not better, the plan will be to do a CT scan.

## 2024-08-03 DIAGNOSIS — Z17.1 MALIGNANT NEOPLASM OF LOWER-INNER QUADRANT OF RIGHT BREAST OF FEMALE, ESTROGEN RECEPTOR NEGATIVE (H): ICD-10-CM

## 2024-08-03 DIAGNOSIS — C50.311 MALIGNANT NEOPLASM OF LOWER-INNER QUADRANT OF RIGHT BREAST OF FEMALE, ESTROGEN RECEPTOR NEGATIVE (H): ICD-10-CM

## 2024-08-05 RX ORDER — TAMOXIFEN CITRATE 20 MG/1
20 TABLET ORAL DAILY
Qty: 90 TABLET | Refills: 0 | Status: SHIPPED | OUTPATIENT
Start: 2024-08-05

## 2024-10-23 ENCOUNTER — PATIENT OUTREACH (OUTPATIENT)
Dept: CARE COORDINATION | Facility: CLINIC | Age: 49
End: 2024-10-23
Payer: COMMERCIAL

## 2024-11-01 DIAGNOSIS — C50.311 MALIGNANT NEOPLASM OF LOWER-INNER QUADRANT OF RIGHT BREAST OF FEMALE, ESTROGEN RECEPTOR NEGATIVE (H): ICD-10-CM

## 2024-11-01 DIAGNOSIS — Z17.1 MALIGNANT NEOPLASM OF LOWER-INNER QUADRANT OF RIGHT BREAST OF FEMALE, ESTROGEN RECEPTOR NEGATIVE (H): ICD-10-CM

## 2024-11-01 RX ORDER — TAMOXIFEN CITRATE 20 MG/1
20 TABLET ORAL DAILY
Qty: 90 TABLET | Refills: 0 | Status: SHIPPED | OUTPATIENT
Start: 2024-11-01 | End: 2024-11-07

## 2024-11-01 RX ORDER — TAMOXIFEN CITRATE 20 MG/1
20 TABLET ORAL DAILY
Qty: 90 TABLET | Refills: 0 | Status: SHIPPED | OUTPATIENT
Start: 2024-11-01

## 2024-11-01 RX ORDER — TAMOXIFEN CITRATE 20 MG/1
20 TABLET ORAL DAILY
Qty: 90 TABLET | Refills: 0 | Status: SHIPPED | OUTPATIENT
Start: 2024-11-01 | End: 2024-11-01

## 2024-11-05 SDOH — HEALTH STABILITY: PHYSICAL HEALTH: ON AVERAGE, HOW MANY DAYS PER WEEK DO YOU ENGAGE IN MODERATE TO STRENUOUS EXERCISE (LIKE A BRISK WALK)?: 3 DAYS

## 2024-11-05 SDOH — HEALTH STABILITY: PHYSICAL HEALTH: ON AVERAGE, HOW MANY MINUTES DO YOU ENGAGE IN EXERCISE AT THIS LEVEL?: 20 MIN

## 2024-11-05 ASSESSMENT — SOCIAL DETERMINANTS OF HEALTH (SDOH): HOW OFTEN DO YOU GET TOGETHER WITH FRIENDS OR RELATIVES?: ONCE A WEEK

## 2024-11-07 ENCOUNTER — OFFICE VISIT (OUTPATIENT)
Dept: INTERNAL MEDICINE | Facility: CLINIC | Age: 49
End: 2024-11-07
Payer: COMMERCIAL

## 2024-11-07 VITALS
HEIGHT: 63 IN | OXYGEN SATURATION: 98 % | RESPIRATION RATE: 12 BRPM | SYSTOLIC BLOOD PRESSURE: 117 MMHG | TEMPERATURE: 98.7 F | HEART RATE: 77 BPM | DIASTOLIC BLOOD PRESSURE: 72 MMHG | BODY MASS INDEX: 22.95 KG/M2 | WEIGHT: 129.5 LBS

## 2024-11-07 DIAGNOSIS — Z17.1 MALIGNANT NEOPLASM OF LOWER-INNER QUADRANT OF RIGHT BREAST OF FEMALE, ESTROGEN RECEPTOR NEGATIVE (H): ICD-10-CM

## 2024-11-07 DIAGNOSIS — C50.311 MALIGNANT NEOPLASM OF LOWER-INNER QUADRANT OF RIGHT BREAST OF FEMALE, ESTROGEN RECEPTOR NEGATIVE (H): ICD-10-CM

## 2024-11-07 DIAGNOSIS — Z12.4 CERVICAL CANCER SCREENING: ICD-10-CM

## 2024-11-07 DIAGNOSIS — I10 ESSENTIAL HYPERTENSION: ICD-10-CM

## 2024-11-07 DIAGNOSIS — Z00.00 VISIT FOR PREVENTIVE HEALTH EXAMINATION: ICD-10-CM

## 2024-11-07 DIAGNOSIS — Z12.11 SCREEN FOR COLON CANCER: Primary | ICD-10-CM

## 2024-11-07 DIAGNOSIS — G62.0 DRUG-INDUCED POLYNEUROPATHY (H): ICD-10-CM

## 2024-11-07 DIAGNOSIS — R00.2 FLUTTERING SENSATION OF HEART: ICD-10-CM

## 2024-11-07 DIAGNOSIS — E78.1 HYPERTRIGLYCERIDEMIA: ICD-10-CM

## 2024-11-07 PROCEDURE — 99396 PREV VISIT EST AGE 40-64: CPT | Performed by: NURSE PRACTITIONER

## 2024-11-07 RX ORDER — LISINOPRIL 20 MG/1
20 TABLET ORAL DAILY
Qty: 90 TABLET | Refills: 3 | Status: SHIPPED | OUTPATIENT
Start: 2024-11-07

## 2024-11-07 ASSESSMENT — PAIN SCALES - GENERAL: PAINLEVEL_OUTOF10: NO PAIN (0)

## 2024-11-07 NOTE — PROGRESS NOTES
Preventive Care Visit  [facility] EDE Michael NP,    Nov 7, 2024      Assessment & Plan   Problem List Items Addressed This Visit       Malignant neoplasm of lower-inner quadrant of right breast of female, estrogen receptor negative (H)    Relevant Orders    DX Bone Density     Other Visit Diagnoses       Screen for colon cancer    -  Primary    Relevant Orders    Colonoscopy Screening  Referral    Cervical cancer screening        Essential hypertension        Relevant Medications    lisinopril (ZESTRIL) 20 MG tablet    Visit for preventive health examination        Relevant Orders    Lipid Profile (Chol, Trig, HDL, LDL calc)    TSH with free T4 reflex    CBC with platelets and differential    Comprehensive metabolic panel    UA Macroscopic with reflex to Microscopic and Culture - Lab Collect    Vitamin B12    Hypertriglyceridemia        Drug-induced polyneuropathy (H)        Fluttering sensation of heart                        Counseling  Appropriate preventive services were addressed with this patient via screening, questionnaire, or discussion as appropriate for fall prevention, nutrition, physical activity, Tobacco-use cessation, social engagement, weight loss and cognition.  Checklist reviewing preventive services available has been given to the patient.  Reviewed patient's diet, addressing concerns and/or questions.   She is at risk for lack of exercise and has been provided with information to increase physical activity for the benefit of her well-being.   She is at risk for psychosocial distress and has been provided with information to reduce risk.           Aurelia Jenkins is a 49 year old, presenting for the following:  Physical        11/7/2024     2:19 PM   Additional Questions   Roomed by MEGHA Dailey   Accompanied by LINDA KANG          Health Care Directive  Patient does not have a Health Care Directive: Discussed advance care planning with patient;  however, patient declined at this time.      11/5/2024   General Health   How would you rate your overall physical health? Good   Feel stress (tense, anxious, or unable to sleep) Only a little      (!) STRESS CONCERN      11/5/2024   Nutrition   Three or more servings of calcium each day? Yes   Diet: Regular (no restrictions)   How many servings of fruit and vegetables per day? (!) 2-3   How many sweetened beverages each day? 0-1            11/5/2024   Exercise   Days per week of moderate/strenous exercise 3 days   Average minutes spent exercising at this level 20 min            11/5/2024   Social Factors   Frequency of gathering with friends or relatives Once a week   Worry food won't last until get money to buy more No   Food not last or not have enough money for food? Yes   Do you have housing? (Housing is defined as stable permanent housing and does not include staying ouside in a car, in a tent, in an abandoned building, in an overnight shelter, or couch-surfing.) Yes   Are you worried about losing your housing? No   Lack of transportation? No   Unable to get utilities (heat,electricity)? No      (!) FOOD SECURITY CONCERN PRESENT      11/5/2024   Dental   Dentist two times every year? Yes            11/5/2024   TB Screening   Were you born outside of the [place]? No            Today's PHQ-2 Score:       11/7/2024     2:17 PM   PHQ-2 ( 1999 [employer])   Q1: Little interest or pleasure in doing things 0    Q2: Feeling down, depressed or hopeless 0    PHQ-2 Score 0    Q1: Little interest or pleasure in doing things Not at all   Q2: Feeling down, depressed or hopeless Not at all   PHQ-2 Score 0       Patient-reported           11/5/2024   Substance Use   Alcohol more than 3/day or more than 7/wk Not Applicable   Do you use any other substances recreationally? No        Social History     Tobacco Use    Smoking status: Never    Smokeless tobacco: Never   Substance Use Topics    Alcohol use: No    Drug use: No                     2024   One time HIV Screening   Previous HIV test? Yes          2024   STI Screening   New sexual partner(s) since last STI/HIV test? No        History of abnormal Pap smear: YES - other categories - see link Cervical Cytology Screening Guidelines        Latest Ref Rng & Units 11/10/2021     9:14 AM   PAP / HPV   PAP  Negative for Intraepithelial Lesion or Malignancy (NILM)    HPV 16 DNA Negative Negative    HPV 18 DNA Negative Negative    Other HR HPV Negative Negative      ASCVD Risk   The 10-year ASCVD risk score (Jordy PUCKETT, et al., 2019) is: 2.8%    Values used to calculate the score:      Age: 49 years      Sex: Female      Is Non- : No      Diabetic: No      Tobacco smoker: No      Systolic Blood Pressure: 117 mmHg      Is BP treated: Yes      HDL Cholesterol: 27 mg/dL      Total Cholesterol: 181 mg/dL        2024   Contraception/Family Planning   Questions about contraception or family planning No           Reviewed and updated as needed this visit by Provider     Meds                Past Medical History:   Diagnosis Date    Breast cancer (H)     stage IIB, ER+, [place] neg, HER2 neg    History of anesthesia complications     Slow to wake up    HTN (hypertension)     Nonsmoker     Thyroid nodule      Past Surgical History:   Procedure Laterality Date    ABDOMEN SURGERY  2010        BREAST SURGERY  2021    double mastectomy     SECTION  2010    EXCISE MASS NECK Left 2022    lipoma    OK INSJ PRPH CTR VAD W/SUBQ PORT AGE 5 YR/> Left 10/08/2020    Procedure: Port Placement;  Surgeon: Kimberly Will MD;  Location: [facility];  Service: General    OK MASTECTOMY, SIMPLE, COMPLETE Bilateral 2021    Procedure: Bilateral Mastectomies; Right [place] Lymph Node Biopsy; Port Removal;  Surgeon: Kimberly Will MD;  Location: [facility];  Service: General    US BREAST CLIP PLACEMENT RIGHT Right  10/08/2020    US SENTINEL NODE INJECTION  2021     OB History    Para Term  AB Living   1 1 1 0 0 1   SAB IAB Ectopic Multiple Live Births   0 0 0 0 0      # Outcome Date GA Lbr Mike/2nd Weight Sex Type Anes PTL Lv   1 Term              BP Readings from Last 3 Encounters:   24 117/72   24 126/68   23 (!) 144/76    Wt Readings from Last 3 Encounters:   24 58.7 kg (129 lb 8 oz)   24 58.3 kg (128 lb 9.6 oz)   23 58.2 kg (128 lb 6.4 oz)                  Patient Active Problem List   Diagnosis    HTN (hypertension)    Nonsmoker    Malignant neoplasm of lower-inner quadrant of right breast of female, estrogen receptor negative (H)    Screening for cervical cancer    Neck mass     Past Surgical History:   Procedure Laterality Date    ABDOMEN SURGERY  2010        BREAST SURGERY  2021    double mastectomy     SECTION      EXCISE MASS NECK Left 2022    lipoma    [place] INSJ PRPH CTR VAD W/SUBQ PORT AGE 5 YR/> Left 10/08/2020    Procedure: Port Placement;  Surgeon: Kimberly Will MD;  Location: [facility];  Service: General    KY MASTECTOMY, SIMPLE, COMPLETE Bilateral 2021    Procedure: Bilateral Mastectomies; Right [place] Lymph Node Biopsy; Port Removal;  Surgeon: Kimberly Will MD;  Location: [facility];  Service: General    US BREAST CLIP PLACEMENT RIGHT Right 10/08/2020    US SENTINEL NODE INJECTION  2021       Social History     Tobacco Use    Smoking status: Never    Smokeless tobacco: Never   Substance Use Topics    Alcohol use: No     Family History   Problem Relation Age of Onset    Hypertension Mother     Coronary Artery Disease Mother         A FIB, along with 2 uncles    Hypertension Father     Substance Abuse Father         Alcohol    Cancer Maternal Grandmother 70        unknown-abdominal?    Other Cancer Maternal Grandmother         cancer in the abdomen found at upon autopsy    Cancer  Maternal Grandfather 70        liver?    Prostate Cancer Maternal Grandfather     Coronary Artery Disease Paternal Grandfather         1 grandfather    Diabetes Cousin         2 female cousins and 1 aunt         Current Outpatient Medications   Medication Sig Dispense Refill    Ascorbic Acid (VITAMIN C) 100 MG CHEW       lisinopril (ZESTRIL) 20 MG tablet Take 1 tablet (20 mg) by mouth daily. 90 tablet 3    multivitamin w/minerals (THERA-VIT-M) tablet Take 1 tablet by mouth daily      tamoxifen (NOLVADEX) 20 MG tablet TAKE 1 TABLET BY MOUTH EVERY DAY 90 tablet 0     Allergies   Allergen Reactions    Betamethasone Dipropionate (Augmented) [Betamethasone]     Sulfa (Sulfonamide Antibiotics) [Sulfa Antibiotics] Unknown    Latex Itching     Recent Labs   Lab Test 06/26/24  0857 12/01/23  0850 12/01/23  0848 05/26/23  0826 12/08/22  1329 11/09/22  1249 01/13/22  0944 11/10/21  0937 10/15/21  0858 06/16/21  0917 03/03/21  0953 10/09/20  0840 01/23/20  0929   A1C  --   --   --   --   --   --   --  5.3  --   --   --   --   --    LDL  --   --   --   --   --   --   --   --   --   --   --   --  85   HDL  --   --  27*  --   --  24*  --   --   --   --   --   --  35*   TRIG  --   --  682*  --   --  742*  --  2,029*   < >  --   --   --  347*   ALT 38 47  --  44*   < > 39*   < >  --    < > 32 46*   < >  --    CR 0.70 0.70  --  0.67   < > 0.67   < >  --    < > 0.74 0.72   < > 0.69   GFRESTIMATED >90 >90  --  >90   < > >90   < >  --    < > >60 >60   < > >60   GFRESTBLACK  --   --   --   --   --   --   --   --   --  >60 >60   < > >60   POTASSIUM 4.3 4.1  --  4.0   < > 3.8   < >  --    < > 3.9 3.8   < > 4.2   TSH  --   --  6.51*  --   --  3.44  --   --    < >  --   --   --  <0.01*    < > = values in this interval not displayed.          Review of Systems  Constitutional, HEENT, cardiovascular, pulmonary, GI, , musculoskeletal, neuro, skin, endocrine and psych systems are negative, except as otherwise noted.     Objective    Exam  BP  "117/72 (BP Location: Right arm, Patient Position: Sitting, Cuff Size: Adult Regular)   Pulse 77   Temp 98.7  F (37.1  C) (Oral)   Resp 12   Ht 1.608 m (5' 3.3\")   Wt 58.7 kg (129 lb 8 oz)   SpO2 98%   BMI 22.72 kg/m     Estimated body mass index is 22.72 kg/m  as calculated from the following:    Height as of this encounter: 1.608 m (5' 3.3\").    Weight as of this encounter: 58.7 kg (129 lb 8 oz).    Physical Exam  GENERAL: alert and no distress  EYES: Eyes grossly normal to inspection, PERRL and conjunctivae and sclerae normal  HENT: ear canals and TM's normal, nose and mouth without ulcers or lesions  NECK: no adenopathy, no asymmetry, masses, or scars  RESP: lungs clear to auscultation - no rales, rhonchi or wheezes  CV: regular rate and rhythm, normal S1 S2, no S3 or S4, no murmur, click or rub, no peripheral edema  ABDOMEN: soft, nontender, no hepatosplenomegaly, no masses and bowel sounds normal  MS: no gross musculoskeletal defects noted, no edema  NEURO: Normal strength and tone, mentation intact and speech normal  PSYCH: mentation appears normal, affect normal/bright        Signed Electronically by: Cindy Michael NP    "

## 2024-11-08 ENCOUNTER — PATIENT OUTREACH (OUTPATIENT)
Dept: CARE COORDINATION | Facility: CLINIC | Age: 49
End: 2024-11-08
Payer: COMMERCIAL

## 2024-11-25 ENCOUNTER — MYC REFILL (OUTPATIENT)
Dept: ONCOLOGY | Facility: HOSPITAL | Age: 49
End: 2024-11-25
Payer: COMMERCIAL

## 2024-11-25 DIAGNOSIS — C50.311 MALIGNANT NEOPLASM OF LOWER-INNER QUADRANT OF RIGHT BREAST OF FEMALE, ESTROGEN RECEPTOR NEGATIVE (H): ICD-10-CM

## 2024-11-25 DIAGNOSIS — Z17.1 MALIGNANT NEOPLASM OF LOWER-INNER QUADRANT OF RIGHT BREAST OF FEMALE, ESTROGEN RECEPTOR NEGATIVE (H): ICD-10-CM

## 2024-11-25 RX ORDER — TAMOXIFEN CITRATE 20 MG/1
20 TABLET ORAL DAILY
Qty: 90 TABLET | Refills: 0 | Status: SHIPPED | OUTPATIENT
Start: 2024-11-25

## 2024-12-05 ENCOUNTER — ONCOLOGY VISIT (OUTPATIENT)
Dept: ONCOLOGY | Facility: HOSPITAL | Age: 49
End: 2024-12-05
Payer: COMMERCIAL

## 2024-12-05 ENCOUNTER — LAB (OUTPATIENT)
Dept: INFUSION THERAPY | Facility: HOSPITAL | Age: 49
End: 2024-12-05
Payer: COMMERCIAL

## 2024-12-05 VITALS
BODY MASS INDEX: 21.72 KG/M2 | OXYGEN SATURATION: 99 % | TEMPERATURE: 97.9 F | SYSTOLIC BLOOD PRESSURE: 128 MMHG | WEIGHT: 127.2 LBS | HEART RATE: 68 BPM | RESPIRATION RATE: 16 BRPM | HEIGHT: 64 IN | DIASTOLIC BLOOD PRESSURE: 61 MMHG

## 2024-12-05 DIAGNOSIS — C50.311 MALIGNANT NEOPLASM OF LOWER-INNER QUADRANT OF RIGHT BREAST OF FEMALE, ESTROGEN RECEPTOR NEGATIVE (H): ICD-10-CM

## 2024-12-05 DIAGNOSIS — Z17.1 MALIGNANT NEOPLASM OF LOWER-INNER QUADRANT OF RIGHT BREAST OF FEMALE, ESTROGEN RECEPTOR NEGATIVE (H): ICD-10-CM

## 2024-12-05 DIAGNOSIS — Z17.1 MALIGNANT NEOPLASM OF LOWER-INNER QUADRANT OF RIGHT BREAST OF FEMALE, ESTROGEN RECEPTOR NEGATIVE (H): Primary | ICD-10-CM

## 2024-12-05 DIAGNOSIS — Z00.00 VISIT FOR PREVENTIVE HEALTH EXAMINATION: ICD-10-CM

## 2024-12-05 DIAGNOSIS — C50.311 MALIGNANT NEOPLASM OF LOWER-INNER QUADRANT OF RIGHT BREAST OF FEMALE, ESTROGEN RECEPTOR NEGATIVE (H): Primary | ICD-10-CM

## 2024-12-05 LAB
ALBUMIN SERPL BCG-MCNC: 4.4 G/DL (ref 3.5–5.2)
ALBUMIN UR-MCNC: NEGATIVE MG/DL
ALP SERPL-CCNC: 77 U/L (ref 40–150)
ALT SERPL W P-5'-P-CCNC: 56 U/L (ref 0–50)
ANION GAP SERPL CALCULATED.3IONS-SCNC: 11 MMOL/L (ref 7–15)
APPEARANCE UR: CLEAR
AST SERPL W P-5'-P-CCNC: 56 U/L (ref 0–45)
BASOPHILS # BLD AUTO: 0 10E3/UL (ref 0–0.2)
BASOPHILS NFR BLD AUTO: 1 %
BILIRUB SERPL-MCNC: 0.3 MG/DL
BILIRUB UR QL STRIP: NEGATIVE
BUN SERPL-MCNC: 14.5 MG/DL (ref 6–20)
CALCIUM SERPL-MCNC: 9.1 MG/DL (ref 8.8–10.4)
CHLORIDE SERPL-SCNC: 99 MMOL/L (ref 98–107)
COLOR UR AUTO: COLORLESS
CREAT SERPL-MCNC: 0.68 MG/DL (ref 0.51–0.95)
EGFRCR SERPLBLD CKD-EPI 2021: >90 ML/MIN/1.73M2
EOSINOPHIL # BLD AUTO: 0.1 10E3/UL (ref 0–0.7)
EOSINOPHIL NFR BLD AUTO: 1 %
ERYTHROCYTE [DISTWIDTH] IN BLOOD BY AUTOMATED COUNT: 13.4 % (ref 10–15)
GLUCOSE SERPL-MCNC: 104 MG/DL (ref 70–99)
GLUCOSE UR STRIP-MCNC: NEGATIVE MG/DL
HCO3 SERPL-SCNC: 26 MMOL/L (ref 22–29)
HCT VFR BLD AUTO: 39.5 % (ref 35–47)
HGB BLD-MCNC: 13.1 G/DL (ref 11.7–15.7)
HGB UR QL STRIP: NEGATIVE
IMM GRANULOCYTES # BLD: 0 10E3/UL
IMM GRANULOCYTES NFR BLD: 0 %
KETONES UR STRIP-MCNC: NEGATIVE MG/DL
LEUKOCYTE ESTERASE UR QL STRIP: NEGATIVE
LYMPHOCYTES # BLD AUTO: 1.2 10E3/UL (ref 0.8–5.3)
LYMPHOCYTES NFR BLD AUTO: 22 %
MCH RBC QN AUTO: 29.3 PG (ref 26.5–33)
MCHC RBC AUTO-ENTMCNC: 33.2 G/DL (ref 31.5–36.5)
MCV RBC AUTO: 88 FL (ref 78–100)
MONOCYTES # BLD AUTO: 0.5 10E3/UL (ref 0–1.3)
MONOCYTES NFR BLD AUTO: 8 %
NEUTROPHILS # BLD AUTO: 3.8 10E3/UL (ref 1.6–8.3)
NEUTROPHILS NFR BLD AUTO: 68 %
NITRATE UR QL: NEGATIVE
NRBC # BLD AUTO: 0 10E3/UL
NRBC BLD AUTO-RTO: 0 /100
PH UR STRIP: 5.5 [PH] (ref 5–7)
PLATELET # BLD AUTO: 269 10E3/UL (ref 150–450)
POTASSIUM SERPL-SCNC: 3.6 MMOL/L (ref 3.4–5.3)
PROT SERPL-MCNC: 7.2 G/DL (ref 6.4–8.3)
RBC # BLD AUTO: 4.47 10E6/UL (ref 3.8–5.2)
SODIUM SERPL-SCNC: 136 MMOL/L (ref 135–145)
SP GR UR STRIP: 1 (ref 1–1.03)
T4 FREE SERPL-MCNC: 1.14 NG/DL (ref 0.9–1.7)
TSH SERPL DL<=0.005 MIU/L-ACNC: 5 UIU/ML (ref 0.3–4.2)
UROBILINOGEN UR STRIP-MCNC: <2 MG/DL
VIT B12 SERPL-MCNC: 630 PG/ML (ref 232–1245)
WBC # BLD AUTO: 5.6 10E3/UL (ref 4–11)

## 2024-12-05 PROCEDURE — 84439 ASSAY OF FREE THYROXINE: CPT

## 2024-12-05 PROCEDURE — 82040 ASSAY OF SERUM ALBUMIN: CPT

## 2024-12-05 PROCEDURE — 85025 COMPLETE CBC W/AUTO DIFF WBC: CPT

## 2024-12-05 PROCEDURE — 99214 OFFICE O/P EST MOD 30 MIN: CPT | Performed by: NURSE PRACTITIONER

## 2024-12-05 PROCEDURE — 81003 URINALYSIS AUTO W/O SCOPE: CPT

## 2024-12-05 PROCEDURE — 84443 ASSAY THYROID STIM HORMONE: CPT

## 2024-12-05 PROCEDURE — 82607 VITAMIN B-12: CPT

## 2024-12-05 PROCEDURE — 84155 ASSAY OF PROTEIN SERUM: CPT

## 2024-12-05 PROCEDURE — 36415 COLL VENOUS BLD VENIPUNCTURE: CPT

## 2024-12-05 ASSESSMENT — PAIN SCALES - GENERAL: PAINLEVEL_OUTOF10: NO PAIN (0)

## 2024-12-05 NOTE — PROGRESS NOTES
"Oncology Rooming Note    December 5, 2024 1:28 PM   Jes Mistry is a 49 year old female who presents for:    Chief Complaint   Patient presents with    Oncology Clinic Visit     Malignant neoplasm of lower-inner quadrant of right breast of female, estrogen receptor negative (H)     Initial Vitals: /61   Pulse 68   Temp 97.9  F (36.6  C)   Resp 16   Ht 1.613 m (5' 3.5\")   Wt 57.7 kg (127 lb 3.2 oz)   SpO2 99%   BMI 22.18 kg/m   Estimated body mass index is 22.18 kg/m  as calculated from the following:    Height as of this encounter: 1.613 m (5' 3.5\").    Weight as of this encounter: 57.7 kg (127 lb 3.2 oz). Body surface area is 1.61 meters squared.  No Pain (0) Comment: Data Unavailable   No LMP recorded. (Menstrual status: Chemotherapy).  Allergies reviewed: Yes  Medications reviewed: Yes    Medications: Medication refills not needed today.  Pharmacy name entered into [employer]: [id number] - [address]    Frailty Screening:   Is the patient here for a new oncology consult visit in cancer care? 2. No      Clinical concerns: None      Priscila Balderas LPN             "

## 2024-12-05 NOTE — LETTER
"12/5/2024      Jes Mistry  [id number] [facility] [address]      Dear Colleague,    Thank you for referring your patient, Jes Mistry, to the [facility]. Please see a copy of my visit note below.    Oncology Rooming Note    December 5, 2024 1:28 PM   Jes Mistry is a 49 year old female who presents for:    Chief Complaint   Patient presents with     Oncology Clinic Visit     Malignant neoplasm of lower-inner quadrant of right breast of female, estrogen receptor negative (H)     Initial Vitals: /61   Pulse 68   Temp 97.9  F (36.6  C)   Resp 16   Ht 1.613 m (5' 3.5\")   Wt 57.7 kg (127 lb 3.2 oz)   SpO2 99%   BMI 22.18 kg/m   Estimated body mass index is 22.18 kg/m  as calculated from the following:    Height as of this encounter: 1.613 m (5' 3.5\").    Weight as of this encounter: 57.7 kg (127 lb 3.2 oz). Body surface area is 1.61 meters squared.  No Pain (0) Comment: Data Unavailable   No LMP recorded. (Menstrual status: Chemotherapy).  Allergies reviewed: Yes  Medications reviewed: Yes    Medications: Medication refills not needed today.  Pharmacy name entered into [employer]: [id number] - [address]    Frailty Screening:   Is the patient here for a new oncology consult visit in cancer care? 2. No      Clinical concerns: None      Priscila Balderas LPN               [facility] Hematology and Oncology Progress Note    Patient: Jes Mistry  MRN: [id number]  Date of Service: Dec 5, 2024          Reason for Visit    Chief Complaint   Patient presents with     Oncology Clinic Visit     Malignant neoplasm of lower-inner quadrant of right breast of female, estrogen receptor negative (H)       Assessment and Plan     Cancer Staging   Malignant neoplasm of lower-inner quadrant of right breast of female, estrogen receptor negative (H)  Staging form: Breast, AJCC 8th Edition  - Clinical stage from 10/7/2020: " Stage IIB (cT2, cN0, cM0, G3, ER-, W[place]-, HER2-) - Signed by Kaela Plunkett APRN CNP on 2/7/2022  - Pathologic stage from 3/22/2021: No Stage Recommended (ypT1b, pN0, cM0, G2, ER+, W[place]-, HER2-) - Signed by Kaela Plunkett APRN CNP on 2/7/2022    1. Breast cancer, Stage IIB, biopsy was triple negative, mastectomy did show ER+ at 30%: pt had neoadjuvant chemo with residual disease (6mm). Now on Tamoxifen. Doing well. Tolerating. Will continue current regimen.  I did tell patient since she is young and has grade 3 cancer we may want to take the tamoxifen for up to 10 years.  Today there is no evidence of local recurrence. Will return in 6 months.     ECOG Performance    0 - Independent    Distress Screening (within last 30 days)    No data recorded     Pain  Pain Score: No Pain (0)    Problem List    Patient Active Problem List   Diagnosis     HTN (hypertension)     Nonsmoker     Malignant neoplasm of lower-inner quadrant of right breast of female, estrogen receptor negative (H)     Screening for cervical cancer     Neck mass        ______________________________________________________________________________    History of Present Illness    Diagnosis:     1.  Carcinoma of the right breast: High-grade.  6 o'clock position.  Diagnosed September 2020. Estrogen receptor positive in 1%.  W[place] and HER-2 negative.  Measured 24 x 13 x 19 mm on ultrasound. Ultrasound of the axilla was negative.  2 sentinel nodes are negative.     Treatment:     Neoadjuvant chemotherapy with Adriamycin and Cytoxan started October 9, 2020.  Weekly Taxol started December 3, 2020.     Right-sided mastectomy performed March 21, 2021: Final pathology shows residual invasive ductal carcinoma, grade 2, 6 x 6 x 4 mm.  Negative margins.  ER positive at 30%.  HER-2/emily negative by FISH.     Tamoxifen initiated April 2021.     Interim History:  She is here today for a follow-up visit.  She was last seen 6 months ago.  Overall she says she is feeling  "really quite well.  She denies any new signs or symptoms that she is worried about.  Denies any new bone or back pain.   She denies any fevers or infectious complaints.  Chest wall is stable.  Sometimes she feels like there is some lumpiness but she feels like it is just her ribs because she is quite thin.        Review of Systems    Pertinent items are noted in HPI.    Past History    Past Medical History:   Diagnosis Date     Breast cancer (H) 2020    stage IIB, ER+, [place] neg, HER2 neg     History of anesthesia complications     Slow to wake up     HTN (hypertension)      Nonsmoker      Thyroid nodule 2011       PHYSICAL EXAM  /61   Pulse 68   Temp 97.9  F (36.6  C)   Resp 16   Ht 1.613 m (5' 3.5\")   Wt 57.7 kg (127 lb 3.2 oz)   SpO2 99%   BMI 22.18 kg/m      GENERAL: no acute distress. Cooperative in conversation. Here alone.   RESP: Regular respiratory rate. No expiratory wheezes   MUSCULOSKELETAL: no bilateral leg swelling  NEURO: non focal. Alert and oriented x3.   PSYCH: within normal limits. No depression or anxiety.  SKIN: exposed skin is dry intact.    BREAST: Chest wall exam done.  She status post mastectomies.  There is no abnormal rashes lesions noted.  LYMPH: No axillary lymphadenopathy bilaterally.    Lab Results    Recent Results (from the past week)   TSH with free T4 reflex   Result Value Ref Range    TSH 5.00 (H) 0.30 - 4.20 uIU/mL   Comprehensive metabolic panel   Result Value Ref Range    Sodium 136 135 - 145 mmol/L    Potassium 3.6 3.4 - 5.3 mmol/L    Carbon Dioxide (CO2) 26 22 - 29 mmol/L    Anion Gap 11 7 - 15 mmol/L    Urea Nitrogen 14.5 6.0 - 20.0 mg/dL    Creatinine 0.68 0.51 - 0.95 mg/dL    GFR Estimate >90 >60 mL/min/1.73m2    Calcium 9.1 8.8 - 10.4 mg/dL    Chloride 99 98 - 107 mmol/L    Glucose 104 (H) 70 - 99 mg/dL    Alkaline Phosphatase 77 40 - 150 U/L    AST 56 (H) 0 - 45 U/L    ALT 56 (H) 0 - 50 U/L    Protein Total 7.2 6.4 - 8.3 g/dL    Albumin 4.4 3.5 - 5.2 g/dL    " Bilirubin Total 0.3 <=1.2 mg/dL   UA Macroscopic with reflex to Microscopic and Culture - Lab Collect    Specimen: Urine, NOS   Result Value Ref Range    Color Urine Colorless Colorless, Straw, Light Yellow, Yellow    Appearance Urine Clear Clear    Glucose Urine Negative Negative mg/dL    Bilirubin Urine Negative Negative    Ketones Urine Negative Negative mg/dL    Specific Gravity Urine 1.004 1.001 - 1.030    Blood Urine Negative Negative    pH Urine 5.5 5.0 - 7.0    Protein Albumin Urine Negative Negative mg/dL    Urobilinogen Urine <2.0 <2.0 mg/dL    Nitrite Urine Negative Negative    Leukocyte Esterase Urine Negative Negative   CBC with platelets and differential   Result Value Ref Range    WBC Count 5.6 4.0 - 11.0 10e3/uL    RBC Count 4.47 3.80 - 5.20 10e6/uL    Hemoglobin 13.1 11.7 - 15.7 g/dL    Hematocrit 39.5 35.0 - 47.0 %    MCV 88 78 - 100 fL    MCH 29.3 26.5 - 33.0 pg    MCHC 33.2 31.5 - 36.5 g/dL    RDW 13.4 10.0 - 15.0 %    Platelet Count 269 150 - 450 10e3/uL    % Neutrophils 68 %    % Lymphocytes 22 %    % Monocytes 8 %    % Eosinophils 1 %    % Basophils 1 %    % Immature Granulocytes 0 %    NRBCs per 100 WBC 0 <1 /100    Absolute Neutrophils 3.8 1.6 - 8.3 10e3/uL    Absolute Lymphocytes 1.2 0.8 - 5.3 10e3/uL    Absolute Monocytes 0.5 0.0 - 1.3 10e3/uL    Absolute Eosinophils 0.1 0.0 - 0.7 10e3/uL    Absolute Basophils 0.0 0.0 - 0.2 10e3/uL    Absolute Immature Granulocytes 0.0 <=0.4 10e3/uL    Absolute NRBCs 0.0 10e3/uL       Imaging    No results found.    The longitudinal plan of care for the diagnosis(es)/condition(s) as documented were addressed during this visit. Due to the added complexity in care, I will continue to support Alice in the subsequent management and with ongoing continuity of care.        Signed by: NARA Chan CNP      Again, thank you for allowing me to participate in the care of your patient.        Sincerely,        NARA hCan CNP

## 2024-12-05 NOTE — PROGRESS NOTES
[facility] Hematology and Oncology Progress Note    Patient: Jes Mistry  MRN: [id number]  Date of Service: Dec 5, 2024          Reason for Visit    Chief Complaint   Patient presents with    Oncology Clinic Visit     Malignant neoplasm of lower-inner quadrant of right breast of female, estrogen receptor negative (H)       Assessment and Plan     Cancer Staging   Malignant neoplasm of lower-inner quadrant of right breast of female, estrogen receptor negative (H)  Staging form: Breast, AJCC 8th Edition  - Clinical stage from 10/7/2020: Stage IIB (cT2, cN0, cM0, G3, ER-, ID-, HER2-) - Signed by Kaela Plunkett, NARA CNP on 2/7/2022  - Pathologic stage from 3/22/2021: No Stage Recommended (ypT1b, pN0, cM0, G2, ER+, ID-, HER2-) - Signed by Kaela Plunkett, NARA CNP on 2/7/2022    1. Breast cancer, Stage IIB, biopsy was triple negative, mastectomy did show ER+ at 30%: pt had neoadjuvant chemo with residual disease (6mm). Now on Tamoxifen. Doing well. Tolerating. Will continue current regimen.  I did tell patient since she is young and has grade 3 cancer we may want to take the tamoxifen for up to 10 years.  Today there is no evidence of local recurrence. Will return in 6 months.     ECOG Performance    0 - Independent    Distress Screening (within last 30 days)    No data recorded     Pain  Pain Score: No Pain (0)    Problem List    Patient Active Problem List   Diagnosis    HTN (hypertension)    Nonsmoker    Malignant neoplasm of lower-inner quadrant of right breast of female, estrogen receptor negative (H)    Screening for cervical cancer    Neck mass        ______________________________________________________________________________    History of Present Illness    Diagnosis:     1.  Carcinoma of the right breast: High-grade.  6 o'clock position.  Diagnosed September 2020. Estrogen receptor positive in 1%.  ID and HER-2 negative.  Measured 24 x 13 x 19 mm on ultrasound. Ultrasound of the axilla  "was negative.  2 sentinel nodes are negative.     Treatment:     Neoadjuvant chemotherapy with Adriamycin and Cytoxan started October 9, 2020.  Weekly Taxol started December 3, 2020.     Right-sided mastectomy performed March 21, 2021: Final pathology shows residual invasive ductal carcinoma, grade 2, 6 x 6 x 4 mm.  Negative margins.  ER positive at 30%.  HER-2/emily negative by FISH.     Tamoxifen initiated April 2021.     Interim History:  She is here today for a follow-up visit.  She was last seen 6 months ago.  Overall she says she is feeling really quite well.  She denies any new signs or symptoms that she is worried about.  Denies any new bone or back pain.   She denies any fevers or infectious complaints.  Chest wall is stable.  Sometimes she feels like there is some lumpiness but she feels like it is just her ribs because she is quite thin.        Review of Systems    Pertinent items are noted in HPI.    Past History    Past Medical History:   Diagnosis Date    Breast cancer (H) 2020    stage IIB, ER+, [place] neg, HER2 neg    History of anesthesia complications     Slow to wake up    HTN (hypertension)     Nonsmoker     Thyroid nodule 2011       PHYSICAL EXAM  /61   Pulse 68   Temp 97.9  F (36.6  C)   Resp 16   Ht 1.613 m (5' 3.5\")   Wt 57.7 kg (127 lb 3.2 oz)   SpO2 99%   BMI 22.18 kg/m      GENERAL: no acute distress. Cooperative in conversation. Here alone.   RESP: Regular respiratory rate. No expiratory wheezes   MUSCULOSKELETAL: no bilateral leg swelling  NEURO: non focal. Alert and oriented x3.   PSYCH: within normal limits. No depression or anxiety.  SKIN: exposed skin is dry intact.    BREAST: Chest wall exam done.  She status post mastectomies.  There is no abnormal rashes lesions noted.  LYMPH: No axillary lymphadenopathy bilaterally.    Lab Results    Recent Results (from the past week)   TSH with free T4 reflex   Result Value Ref Range    TSH 5.00 (H) 0.30 - 4.20 uIU/mL   Comprehensive " metabolic panel   Result Value Ref Range    Sodium 136 135 - 145 mmol/L    Potassium 3.6 3.4 - 5.3 mmol/L    Carbon Dioxide (CO2) 26 22 - 29 mmol/L    Anion Gap 11 7 - 15 mmol/L    Urea Nitrogen 14.5 6.0 - 20.0 mg/dL    Creatinine 0.68 0.51 - 0.95 mg/dL    GFR Estimate >90 >60 mL/min/1.73m2    Calcium 9.1 8.8 - 10.4 mg/dL    Chloride 99 98 - 107 mmol/L    Glucose 104 (H) 70 - 99 mg/dL    Alkaline Phosphatase 77 40 - 150 U/L    AST 56 (H) 0 - 45 U/L    ALT 56 (H) 0 - 50 U/L    Protein Total 7.2 6.4 - 8.3 g/dL    Albumin 4.4 3.5 - 5.2 g/dL    Bilirubin Total 0.3 <=1.2 mg/dL   UA Macroscopic with reflex to Microscopic and Culture - Lab Collect    Specimen: Urine, NOS   Result Value Ref Range    Color Urine Colorless Colorless, Straw, Light Yellow, Yellow    Appearance Urine Clear Clear    Glucose Urine Negative Negative mg/dL    Bilirubin Urine Negative Negative    Ketones Urine Negative Negative mg/dL    Specific Gravity Urine 1.004 1.001 - 1.030    Blood Urine Negative Negative    pH Urine 5.5 5.0 - 7.0    Protein Albumin Urine Negative Negative mg/dL    Urobilinogen Urine <2.0 <2.0 mg/dL    Nitrite Urine Negative Negative    Leukocyte Esterase Urine Negative Negative   CBC with platelets and differential   Result Value Ref Range    WBC Count 5.6 4.0 - 11.0 10e3/uL    RBC Count 4.47 3.80 - 5.20 10e6/uL    Hemoglobin 13.1 11.7 - 15.7 g/dL    Hematocrit 39.5 35.0 - 47.0 %    MCV 88 78 - 100 fL    MCH 29.3 26.5 - 33.0 pg    MCHC 33.2 31.5 - 36.5 g/dL    RDW 13.4 10.0 - 15.0 %    Platelet Count 269 150 - 450 10e3/uL    % Neutrophils 68 %    % Lymphocytes 22 %    % Monocytes 8 %    % Eosinophils 1 %    % Basophils 1 %    % Immature Granulocytes 0 %    NRBCs per 100 WBC 0 <1 /100    Absolute Neutrophils 3.8 1.6 - 8.3 10e3/uL    Absolute Lymphocytes 1.2 0.8 - 5.3 10e3/uL    Absolute Monocytes 0.5 0.0 - 1.3 10e3/uL    Absolute Eosinophils 0.1 0.0 - 0.7 10e3/uL    Absolute Basophils 0.0 0.0 - 0.2 10e3/uL    Absolute Immature  Granulocytes 0.0 <=0.4 10e3/uL    Absolute NRBCs 0.0 10e3/uL       Imaging    No results found.    The longitudinal plan of care for the diagnosis(es)/condition(s) as documented were addressed during this visit. Due to the added complexity in care, I will continue to support Alice in the subsequent management and with ongoing continuity of care.        Signed by: NARA Chan CNP

## 2025-02-03 ENCOUNTER — PATIENT OUTREACH (OUTPATIENT)
Dept: CARE COORDINATION | Facility: CLINIC | Age: 50
End: 2025-02-03
Payer: COMMERCIAL

## 2025-02-18 DIAGNOSIS — Z17.1 MALIGNANT NEOPLASM OF LOWER-INNER QUADRANT OF RIGHT BREAST OF FEMALE, ESTROGEN RECEPTOR NEGATIVE (H): ICD-10-CM

## 2025-02-18 DIAGNOSIS — C50.311 MALIGNANT NEOPLASM OF LOWER-INNER QUADRANT OF RIGHT BREAST OF FEMALE, ESTROGEN RECEPTOR NEGATIVE (H): ICD-10-CM

## 2025-02-18 RX ORDER — TAMOXIFEN CITRATE 20 MG/1
20 TABLET ORAL DAILY
Qty: 90 TABLET | Refills: 1 | Status: SHIPPED | OUTPATIENT
Start: 2025-02-18

## 2025-02-18 NOTE — TELEPHONE ENCOUNTER
Therapy started April 2021    Last Written Prescription Date:  11/25/24  Last Fill Quantity: 90,  # refills: 0   Last office visit provider:  12/5/24 with Kaela Plunkett CNP     Requested Prescriptions   Pending Prescriptions Disp Refills    tamoxifen (NOLVADEX) 20 MG tablet [Pharmacy Med Name: TAMOXIFEN 20 MG TABLET] 90 tablet 0     Sig: TAKE 1 TABLET BY MOUTH EVERY DAY       There is no refill protocol information for this order          Bethany Gorman RN 02/18/25 12:12 PM

## 2025-06-05 DIAGNOSIS — C50.311 MALIGNANT NEOPLASM OF LOWER-INNER QUADRANT OF RIGHT BREAST OF FEMALE, ESTROGEN RECEPTOR NEGATIVE (H): Primary | ICD-10-CM

## 2025-06-05 DIAGNOSIS — Z17.1 MALIGNANT NEOPLASM OF LOWER-INNER QUADRANT OF RIGHT BREAST OF FEMALE, ESTROGEN RECEPTOR NEGATIVE (H): Primary | ICD-10-CM

## 2025-06-06 ENCOUNTER — ONCOLOGY VISIT (OUTPATIENT)
Dept: ONCOLOGY | Facility: HOSPITAL | Age: 50
End: 2025-06-06
Attending: INTERNAL MEDICINE
Payer: COMMERCIAL

## 2025-06-06 VITALS
HEART RATE: 78 BPM | BODY MASS INDEX: 21.85 KG/M2 | RESPIRATION RATE: 16 BRPM | TEMPERATURE: 97.7 F | HEIGHT: 64 IN | DIASTOLIC BLOOD PRESSURE: 58 MMHG | OXYGEN SATURATION: 99 % | WEIGHT: 128 LBS | SYSTOLIC BLOOD PRESSURE: 119 MMHG

## 2025-06-06 DIAGNOSIS — C50.311 MALIGNANT NEOPLASM OF LOWER-INNER QUADRANT OF RIGHT BREAST OF FEMALE, ESTROGEN RECEPTOR NEGATIVE (H): Primary | ICD-10-CM

## 2025-06-06 DIAGNOSIS — Z79.810 LONG-TERM CURRENT USE OF TAMOXIFEN: ICD-10-CM

## 2025-06-06 DIAGNOSIS — Z17.1 MALIGNANT NEOPLASM OF LOWER-INNER QUADRANT OF RIGHT BREAST OF FEMALE, ESTROGEN RECEPTOR NEGATIVE (H): Primary | ICD-10-CM

## 2025-06-06 PROCEDURE — 99214 OFFICE O/P EST MOD 30 MIN: CPT | Performed by: INTERNAL MEDICINE

## 2025-06-06 PROCEDURE — G2211 COMPLEX E/M VISIT ADD ON: HCPCS | Performed by: INTERNAL MEDICINE

## 2025-06-06 ASSESSMENT — PAIN SCALES - GENERAL: PAINLEVEL_OUTOF10: NO PAIN (0)

## 2025-06-06 NOTE — LETTER
6/6/2025      Jes Mistry  [id number]  [facility] [address]      Dear Colleague,    Thank you for referring your patient, Jes Mistry, to the [facility]. Please see a copy of my visit note below.    [facility] Hematology and Oncology Progress Note    Patient: Jes Mistry  MRN: [id number]  Date of Service: Jun 6, 2025        Assessment and Plan:    Cancer Staging  Malignant neoplasm of lower-inner quadrant of right breast of female, estrogen receptor negative (H)  Staging form: Breast, AJCC 8th Edition  - Clinical: Stage IIB (cT2, cN0, cM0, G3, ER-, ND-, HER2-) - Signed by Kaela Plunkett CNP on 10/9/2020     1.  Invasive ductal carcinoma: Continues on tamoxifen.  She is tolerating it well.  She has hot flashes but they are tolerable.  No edema.  No clinical evidence of recurrent disease.  She is 4-1/2 years out from diagnosis.  I reviewed with her that recurrence risk is low at this point. Return to clinic in 6 months with labs.     Data review:  CBC from today was reviewed and shows a white count of 7.6, hemoglobin 13.3 normal.  CMP was reviewed showing a sodium of 138 potassium 4.0.  Calcium 9.6.    Medical decision Making:  I spent 35 minutes in the care of this patient today, which included time necessary for preparation for the visit, face to face time with the patient, communication of recommendations to the care team, and documentation time.    ECOG Performance  0    Diagnosis:    1.  Carcinoma of the right breast: High-grade.  6 o'clock position.  Diagnosed September 2020. Estrogen receptor positive in 1%.  ND and HER-2 negative.  Measured 24 x 13 x 19 mm on ultrasound. Ultrasound of the axilla was negative. 2 sentinel nodes are negative.    Treatment:    Neoadjuvant chemotherapy with Adriamycin and Cytoxan started October 9, 2020.  Weekly Taxol started December 3, 2020.     Bilateral mastectomy performed March 24, 2021: Final  "pathology shows residual invasive ductal carcinoma, grade 2, 6 x 6 x 4 mm.  Negative margins.  ER positive at 30%.  HER-2/emily negative by FISH.  Adjuvant Xeloda was not offered as her residual disease was estrogen receptor positive.    Tamoxifen initiated April 2021.    Interim History:    Alice is here today for a follow-up visit.  She remains on single agent tamoxifen.  Generally tolerating it well.  She does have some hot flashes but these are tolerable.  No edema.  No new areas of pain.    Review of Systems:    As above in the history.     Review of Systems otherwise Negative for:  General: chills, fever or night sweats  Psychological: anxiety or depression  Ophthalmic: blurry vision, double vision or loss of vision, vision change  ENT: epistaxis, oral lesions, hearing changes  Hematological and Lymphatic: bleeding, bruising, jaundice, swollen lymph nodes  Endocrine: unexpected weight changes  Respiratory: cough, hemoptysis, orthopnea or shortness of breath/ACHARYA  Cardiovascular: chest pain, edema, palpitations or PND  Gastrointestinal: abdominal pain, blood in stools, change in bowel habits, constipation, nausea/vomiting  Genito-Urinary: change in urinary stream, incontinence, frequency/urgency  Musculoskeletal: joint pain, stiffness, swelling, muscle pain  Neurological: dizziness, headaches, numbness/tingling  Dermatological: lumps and rash    Past History:    Past Medical History:   Diagnosis Date     Breast cancer (H) 2020    stage IIB, ER+, [place] neg, HER2 neg     History of anesthesia complications     Slow to wake up     HTN (hypertension)      Nonsmoker      Thyroid nodule 2011     Physical Exam:    /58 (BP Location: Left arm, Patient Position: Sitting, Cuff Size: Adult Regular)   Pulse 78   Temp 97.7  F (36.5  C) (Oral)   Resp 16   Ht 1.613 m (5' 3.5\")   Wt 58.1 kg (128 lb)   SpO2 99%   BMI 22.32 kg/m      General: patient appears stated age of 46 year old. Nontoxic and in no distress.   HEENT: " "Head: atraumatic, normocephalic. Sclerae anicteric.  Chest:  Normal respiratory effort.  Thorough examination by palpation of each axilla revealed no mass or firmness.  Skin on the chest wall is normal with no nodule.  Cardiac:  No edema.   Abdomen: abdomen is non-distended  Extremities: normal tone and muscle bulk.  Skin: no lesions or rash on visible skin. Warm and dry.   CNS: alert and oriented. Grossly non-focal.   Psychiatric: normal mood and affect.   Axillae:  no palpable masses in the axillae bilaterally.    Lab Results:    Recent Results (from the past week)   CBC with platelets and differential   Result Value Ref Range    WBC Count 7.6 4.0 - 11.0 10e3/uL    RBC Count 4.55 3.80 - 5.20 10e6/uL    Hemoglobin 13.3 11.7 - 15.7 g/dL    Hematocrit 40.6 35.0 - 47.0 %    MCV 89 78 - 100 fL    MCH 29.2 26.5 - 33.0 pg    MCHC 32.8 31.5 - 36.5 g/dL    RDW 13.3 10.0 - 15.0 %    Platelet Count 223 150 - 450 10e3/uL    % Neutrophils 66 %    % Lymphocytes 24 %    % Monocytes 8 %    % Eosinophils 1 %    % Basophils 0 %    % Immature Granulocytes 0 %    NRBCs per 100 WBC 0 <1 /100    Absolute Neutrophils 5.0 1.6 - 8.3 10e3/uL    Absolute Lymphocytes 1.8 0.8 - 5.3 10e3/uL    Absolute Monocytes 0.6 0.0 - 1.3 10e3/uL    Absolute Eosinophils 0.1 0.0 - 0.7 10e3/uL    Absolute Basophils 0.0 0.0 - 0.2 10e3/uL    Absolute Immature Granulocytes 0.0 <=0.4 10e3/uL    Absolute NRBCs 0.0 10e3/uL     Imaging:    No results found.      Signed by: Elia Olsen MD      Oncology Rooming Note    June 6, 2025 2:12 PM   Jes Mistry is a 50 year old female who presents for:    Chief Complaint   Patient presents with     Oncology Clinic Visit     6 month follow up lab     Initial Vitals: There were no vitals taken for this visit. Estimated body mass index is 22.18 kg/m  as calculated from the following:    Height as of 12/5/24: 1.613 m (5' 3.5\").    Weight as of 12/5/24: 57.7 kg (127 lb 3.2 oz). There is no height or weight on file to " calculate BSA.  Data Unavailable Comment: Data Unavailable   No LMP recorded. (Menstrual status: Chemotherapy).  Allergies reviewed: Yes  Medications reviewed: Yes    Medications: Medication refills not needed today.  Pharmacy name entered into [employer]: [id number] - [address]    Frailty Screening:   Is the patient here for a new oncology consult visit in cancer care? 2. No    PHQ9:  Did this patient require a PHQ9?: No      Clinical concerns: none       Jemma Lenz CMA              Again, thank you for allowing me to participate in the care of your patient.        Sincerely,        Elia Olsen MD    Electronically signed

## 2025-06-06 NOTE — PROGRESS NOTES
[facility] Hematology and Oncology Progress Note    Patient: Jes Mistry  MRN: [id number]  Date of Service: Jun 6, 2025        Assessment and Plan:    Cancer Staging  Malignant neoplasm of lower-inner quadrant of right breast of female, estrogen receptor negative (H)  Staging form: Breast, AJCC 8th Edition  - Clinical: Stage IIB (cT2, cN0, cM0, G3, ER-, MI-, HER2-) - Signed by Kaela Plunkett CNP on 10/9/2020     1.  Invasive ductal carcinoma: Continues on tamoxifen.  She is tolerating it well.  She has hot flashes but they are tolerable.  No edema.  No clinical evidence of recurrent disease.  She is 4-1/2 years out from diagnosis.  I reviewed with her that recurrence risk is low at this point. Return to clinic in 6 months with labs.     Data review:  CBC from today was reviewed and shows a white count of 7.6, hemoglobin 13.3 normal.  CMP was reviewed showing a sodium of 138 potassium 4.0.  Calcium 9.6.    Medical decision Making:  I spent 35 minutes in the care of this patient today, which included time necessary for preparation for the visit, face to face time with the patient, communication of recommendations to the care team, and documentation time.    ECOG Performance  0    Diagnosis:    1.  Carcinoma of the right breast: High-grade.  6 o'clock position.  Diagnosed September 2020. Estrogen receptor positive in 1%.  MI and HER-2 negative.  Measured 24 x 13 x 19 mm on ultrasound. Ultrasound of the axilla was negative. 2 sentinel nodes are negative.    Treatment:    Neoadjuvant chemotherapy with Adriamycin and Cytoxan started October 9, 2020.  Weekly Taxol started December 3, 2020.     Bilateral mastectomy performed March 24, 2021: Final pathology shows residual invasive ductal carcinoma, grade 2, 6 x 6 x 4 mm.  Negative margins.  ER positive at 30%.  HER-2/emily negative by FISH.  Adjuvant Xeloda was not offered as her residual disease was estrogen receptor positive.    Tamoxifen initiated  "April 2021.    Interim History:    Alice is here today for a follow-up visit.  She remains on single agent tamoxifen.  Generally tolerating it well.  She does have some hot flashes but these are tolerable.  No edema.  No new areas of pain.    Review of Systems:    As above in the history.     Review of Systems otherwise Negative for:  General: chills, fever or night sweats  Psychological: anxiety or depression  Ophthalmic: blurry vision, double vision or loss of vision, vision change  ENT: epistaxis, oral lesions, hearing changes  Hematological and Lymphatic: bleeding, bruising, jaundice, swollen lymph nodes  Endocrine: unexpected weight changes  Respiratory: cough, hemoptysis, orthopnea or shortness of breath/ACHARYA  Cardiovascular: chest pain, edema, palpitations or PND  Gastrointestinal: abdominal pain, blood in stools, change in bowel habits, constipation, nausea/vomiting  Genito-Urinary: change in urinary stream, incontinence, frequency/urgency  Musculoskeletal: joint pain, stiffness, swelling, muscle pain  Neurological: dizziness, headaches, numbness/tingling  Dermatological: lumps and rash    Past History:    Past Medical History:   Diagnosis Date    Breast cancer (H) 2020    stage IIB, ER+, [place] neg, HER2 neg    History of anesthesia complications     Slow to wake up    HTN (hypertension)     Nonsmoker     Thyroid nodule 2011     Physical Exam:    /58 (BP Location: Left arm, Patient Position: Sitting, Cuff Size: Adult Regular)   Pulse 78   Temp 97.7  F (36.5  C) (Oral)   Resp 16   Ht 1.613 m (5' 3.5\")   Wt 58.1 kg (128 lb)   SpO2 99%   BMI 22.32 kg/m      General: patient appears stated age of 46 year old. Nontoxic and in no distress.   HEENT: Head: atraumatic, normocephalic. Sclerae anicteric.  Chest:  Normal respiratory effort.  Thorough examination by palpation of each axilla revealed no mass or firmness.  Skin on the chest wall is normal with no nodule.  Cardiac:  No edema.   Abdomen: abdomen is " non-distended  Extremities: normal tone and muscle bulk.  Skin: no lesions or rash on visible skin. Warm and dry.   CNS: alert and oriented. Grossly non-focal.   Psychiatric: normal mood and affect.   Axillae:  no palpable masses in the axillae bilaterally.    Lab Results:    Recent Results (from the past week)   CBC with platelets and differential   Result Value Ref Range    WBC Count 7.6 4.0 - 11.0 10e3/uL    RBC Count 4.55 3.80 - 5.20 10e6/uL    Hemoglobin 13.3 11.7 - 15.7 g/dL    Hematocrit 40.6 35.0 - 47.0 %    MCV 89 78 - 100 fL    MCH 29.2 26.5 - 33.0 pg    MCHC 32.8 31.5 - 36.5 g/dL    RDW 13.3 10.0 - 15.0 %    Platelet Count 223 150 - 450 10e3/uL    % Neutrophils 66 %    % Lymphocytes 24 %    % Monocytes 8 %    % Eosinophils 1 %    % Basophils 0 %    % Immature Granulocytes 0 %    NRBCs per 100 WBC 0 <1 /100    Absolute Neutrophils 5.0 1.6 - 8.3 10e3/uL    Absolute Lymphocytes 1.8 0.8 - 5.3 10e3/uL    Absolute Monocytes 0.6 0.0 - 1.3 10e3/uL    Absolute Eosinophils 0.1 0.0 - 0.7 10e3/uL    Absolute Basophils 0.0 0.0 - 0.2 10e3/uL    Absolute Immature Granulocytes 0.0 <=0.4 10e3/uL    Absolute NRBCs 0.0 10e3/uL     Imaging:    No results found.      Signed by: Elia Olsen MD

## 2025-06-06 NOTE — PROGRESS NOTES
"Oncology Rooming Note    June 6, 2025 2:12 PM   Jes Mistry is a 50 year old female who presents for:    Chief Complaint   Patient presents with    Oncology Clinic Visit     6 month follow up lab     Initial Vitals: There were no vitals taken for this visit. Estimated body mass index is 22.18 kg/m  as calculated from the following:    Height as of 12/5/24: 1.613 m (5' 3.5\").    Weight as of 12/5/24: 57.7 kg (127 lb 3.2 oz). There is no height or weight on file to calculate BSA.  Data Unavailable Comment: Data Unavailable   No LMP recorded. (Menstrual status: Chemotherapy).  Allergies reviewed: Yes  Medications reviewed: Yes    Medications: Medication refills not needed today.  Pharmacy name entered into [employer]: [id number] - [address]    Frailty Screening:   Is the patient here for a new oncology consult visit in cancer care? 2. No    PHQ9:  Did this patient require a PHQ9?: No      Clinical concerns: none       Jemma Lenz CMA            "

## 2025-06-25 NOTE — PROGRESS NOTES
[facility] Hematology and Oncology Progress Note    Patient: Jes Mistry  MRN: [id number]  Date of Service: Jun 26, 2025      Oncologist: Dr. Olsen    Reason for Visit    Chief Complaint   Patient presents with    Oncology Clinic Visit     Return visit for concern of left sided breast lump, midaxilla around rib 5 and 6. Malignant neoplasm of lower-inner quadrant of right breast of female, estrogen receptor negative.       Assessment and Plan     Cancer Staging   Malignant neoplasm of lower-inner quadrant of right breast of female, estrogen receptor negative (H)  Staging form: Breast, AJCC 8th Edition  - Clinical stage from 10/7/2020: Stage IIB (cT2, cN0, cM0, G3, ER-, NC-, HER2-) - Signed by Kaela Plunkett APRN CNP on 2/7/2022  - Pathologic stage from 3/22/2021: No Stage Recommended (ypT1b, pN0, cM0, G2, ER+, NC-, HER2-) - Signed by Kaela Plunkett APRN CNP on 2/7/2022    Invasive ductal carcinoma of right breast  Left axilla lump  On tamoxifen since April 2021. Here for new left axillary lump for the past month. It seems to be shrinking but is still present. No recent infections. Palpable on exam in left lower outer axilla. 1 x 1 cm firm and mobile. Possibly reactive node as she reports it is decreasing in size. Discussed option of obtaining imaging with US now or waiting 2 weeks to see if it decreases in size. She is comfortable waiting for now. Instructed her to call clinic if it starts increasing in size or changing. Will have Lillian call her in 2 weeks to assess changes and if it is persistent or increasing, will order US of left axilla. Pt agreeable to plan. Otherwise will plan on keeping 6 month follow up for surveillance December 2025.     ECOG Performance    0 - Independent    Distress Screening (within last 30 days)    1. How concerned are you about your ability to eat? : 0  2. How concerned are you about unintended weight loss or your current weight? : 0  3. How concerned are you  about feeling depressed or very sad? : 0  4. How concerned are you about feeling anxious or very scared? : 0  5. Do you struggle with the loss of meaning and leonardo in your life? : Not at all  6. How concerned are you about work and home life issues that may be affected by your cancer? : 0  7. How concerned are you about knowing what resources are available to help you? : 0  8. Do you currently have what you would describe as Christian or spiritual struggles?: Not at all       Pain  Pain Score: No Pain (0)    Problem List    Patient Active Problem List   Diagnosis    HTN (hypertension)    Nonsmoker    Malignant neoplasm of lower-inner quadrant of right breast of female, estrogen receptor negative (H)    Screening for cervical cancer    Neck mass        ______________________________________________________________________________    Diagnosis:     1.  Carcinoma of the right breast: High-grade.  6 o'clock position.  Diagnosed September 2020. Estrogen receptor positive in 1%.  MT and HER-2 negative.  Measured 24 x 13 x 19 mm on ultrasound. Ultrasound of the axilla was negative.  2 sentinel nodes are negative.     Treatment:     Neoadjuvant chemotherapy with Adriamycin and Cytoxan started October 9, 2020.  Weekly Taxol started December 3, 2020.     Right-sided mastectomy performed March 21, 2021: Final pathology shows residual invasive ductal carcinoma, grade 2, 6 x 6 x 4 mm.  Negative margins.  ER positive at 30%.  HER-2/emily negative by FISH.     Tamoxifen initiated April 2021.    Interval History    Alice is here for follow up of small lump on axillary area.     She reports this has been ongoing for the past month. It is small and sometimes hard to feel. No skin changes. It is non tender. She believes it may be shrinking. She denies any recent infections or illnesses. She did have a lot of mosquito bites recently. Otherwise feeling well.     Review of Systems    Pertinent items are noted in HPI.    Past History    Past  Medical History:   Diagnosis Date    Breast cancer (H) 2020    stage IIB, ER+, [place] neg, HER2 neg    History of anesthesia complications     Slow to wake up    HTN (hypertension)     Nonsmoker     Thyroid nodule 2011       Physical Exam        6/26/2025     8:08 AM   Vital Signs   Systolic 117   Diastolic 62   Pulse 79   Temperature 97.9  F (36.6  C)   Respirations 16   Weight (LB) 128 lb 1.6 oz   Pain Score 0 (None)   O2 98 %       General: alert, appears stated age, and cooperative  HEENT: Head: Normal, normocephalic, atraumatic.  Chest: Normal chest wall and respirations.   Breasts: bilateral mastectomies with well healed incisions. Left axilla with firm and mobile 1 x 1 cm lump.   Extremities: no lower extremity edema   Skin: no rashes or lesions  CNS: grossly non focal    Lab Results    No results found for this or any previous visit (from the past week).    Imaging    No results found.    The longitudinal plan of care for the diagnosis(es)/condition(s) as documented were addressed during this visit. Due to the added complexity in care, I will continue to support Alice in the subsequent management and with ongoing continuity of care.    Total time 10 minutes, to include face to face visit, review of EMR, ordering, documentation and coordination of care on date of service.    Signed by: Salima Bautista PA-C

## 2025-06-26 ENCOUNTER — ONCOLOGY VISIT (OUTPATIENT)
Dept: ONCOLOGY | Facility: HOSPITAL | Age: 50
End: 2025-06-26
Attending: INTERNAL MEDICINE
Payer: COMMERCIAL

## 2025-06-26 VITALS
WEIGHT: 128.1 LBS | HEART RATE: 79 BPM | RESPIRATION RATE: 16 BRPM | OXYGEN SATURATION: 98 % | BODY MASS INDEX: 22.34 KG/M2 | SYSTOLIC BLOOD PRESSURE: 117 MMHG | TEMPERATURE: 97.9 F | DIASTOLIC BLOOD PRESSURE: 62 MMHG

## 2025-06-26 DIAGNOSIS — R22.32 AXILLARY LUMP, LEFT: ICD-10-CM

## 2025-06-26 DIAGNOSIS — C50.311 MALIGNANT NEOPLASM OF LOWER-INNER QUADRANT OF RIGHT BREAST OF FEMALE, ESTROGEN RECEPTOR NEGATIVE (H): Primary | ICD-10-CM

## 2025-06-26 DIAGNOSIS — Z17.1 MALIGNANT NEOPLASM OF LOWER-INNER QUADRANT OF RIGHT BREAST OF FEMALE, ESTROGEN RECEPTOR NEGATIVE (H): Primary | ICD-10-CM

## 2025-06-26 PROCEDURE — 99213 OFFICE O/P EST LOW 20 MIN: CPT

## 2025-06-26 ASSESSMENT — PAIN SCALES - GENERAL: PAINLEVEL_OUTOF10: NO PAIN (0)

## 2025-06-26 NOTE — PROGRESS NOTES
"Oncology Rooming Note    June 26, 2025 8:11 AM   Jes Mistry is a 50 year old female who presents for:    Chief Complaint   Patient presents with    Oncology Clinic Visit     Return visit for concern of left sided breast lump, midaxilla around rib 5 and 6. Malignant neoplasm of lower-inner quadrant of right breast of female, estrogen receptor negative.     Initial Vitals: /62 (BP Location: Left arm, Patient Position: Sitting, Cuff Size: Adult Regular)   Pulse 79   Temp 97.9  F (36.6  C) (Oral)   Resp 16   Wt 58.1 kg (128 lb 1.6 oz)   SpO2 98%   BMI 22.34 kg/m   Estimated body mass index is 22.34 kg/m  as calculated from the following:    Height as of 6/6/25: 1.613 m (5' 3.5\").    Weight as of this encounter: 58.1 kg (128 lb 1.6 oz). Body surface area is 1.61 meters squared.  No Pain (0) Comment: Data Unavailable   No LMP recorded. (Menstrual status: Chemotherapy).  Allergies reviewed: Yes  Medications reviewed: Yes    Medications: Medication refills not needed today.  Pharmacy name entered into [employer]: [id number] - [address]    Frailty Screening:   Is the patient here for a new oncology consult visit in cancer care? 2. No    PHQ9:  Did this patient require a PHQ9?: No      Clinical concerns: none       Aby Agee CMA              "

## 2025-06-26 NOTE — LETTER
6/26/2025      Jes Mistry  [address] [facility] [address]      Dear Colleague,    Thank you for referring your patient, Jes Mistry, to the [facility] [place]. Please see a copy of my visit note below.    [facility] Hematology and Oncology Progress Note    Patient: Jes Mistry  MRN: [id number]  Date of Service: Jun 26, 2025      Oncologist: Dr. Olsen    Reason for Visit    Chief Complaint   Patient presents with     Oncology Clinic Visit     Return visit for concern of left sided breast lump, midaxilla around rib 5 and 6. Malignant neoplasm of lower-inner quadrant of right breast of female, estrogen receptor negative.       Assessment and Plan     Cancer Staging   Malignant neoplasm of lower-inner quadrant of right breast of female, estrogen receptor negative (H)  Staging form: Breast, AJCC 8th Edition  - Clinical stage from 10/7/2020: Stage IIB (cT2, cN0, cM0, G3, ER-, HI-, HER2-) - Signed by Kaela Plunkett APRN CNP on 2/7/2022  - Pathologic stage from 3/22/2021: No Stage Recommended (ypT1b, pN0, cM0, G2, ER+, HI-, HER2-) - Signed by Kaela Plunkett APRN CNP on 2/7/2022    Invasive ductal carcinoma of right breast  Left axilla lump  On tamoxifen since April 2021. Here for new left axillary lump for the past month. It seems to be shrinking but is still present. No recent infections. Palpable on exam in left lower outer axilla. 1 x 1 cm firm and mobile. Possibly reactive node as she reports it is decreasing in size. Discussed option of obtaining imaging with US now or waiting 2 weeks to see if it decreases in size. She is comfortable waiting for now. Instructed her to call clinic if it starts increasing in size or changing. Will have Lillian call her in 2 weeks to assess changes and if it is persistent or increasing, will order US of left axilla. Pt agreeable to plan. Otherwise will plan on keeping 6 month follow up for surveillance December  2025.     ECOG Performance    0 - Independent    Distress Screening (within last 30 days)    1. How concerned are you about your ability to eat? : 0  2. How concerned are you about unintended weight loss or your current weight? : 0  3. How concerned are you about feeling depressed or very sad? : 0  4. How concerned are you about feeling anxious or very scared? : 0  5. Do you struggle with the loss of meaning and leonardo in your life? : Not at all  6. How concerned are you about work and home life issues that may be affected by your cancer? : 0  7. How concerned are you about knowing what resources are available to help you? : 0  8. Do you currently have what you would describe as Orthodox or spiritual struggles?: Not at all       Pain  Pain Score: No Pain (0)    Problem List    Patient Active Problem List   Diagnosis     HTN (hypertension)     Nonsmoker     Malignant neoplasm of lower-inner quadrant of right breast of female, estrogen receptor negative (H)     Screening for cervical cancer     Neck mass        ______________________________________________________________________________    Diagnosis:     1.  Carcinoma of the right breast: High-grade.  6 o'clock position.  Diagnosed September 2020. Estrogen receptor positive in 1%.  MI and HER-2 negative.  Measured 24 x 13 x 19 mm on ultrasound. Ultrasound of the axilla was negative.  2 sentinel nodes are negative.     Treatment:     Neoadjuvant chemotherapy with Adriamycin and Cytoxan started October 9, 2020.  Weekly Taxol started December 3, 2020.     Right-sided mastectomy performed March 21, 2021: Final pathology shows residual invasive ductal carcinoma, grade 2, 6 x 6 x 4 mm.  Negative margins.  ER positive at 30%.  HER-2/emily negative by FISH.     Tamoxifen initiated April 2021.    Interval History    Alice is here for follow up of small lump on axillary area.     She reports this has been ongoing for the past month. It is small and sometimes hard to feel. No skin  changes. It is non tender. She believes it may be shrinking. She denies any recent infections or illnesses. She did have a lot of mosquito bites recently. Otherwise feeling well.     Review of Systems    Pertinent items are noted in HPI.    Past History    Past Medical History:   Diagnosis Date     Breast cancer (H) 2020    stage IIB, ER+, [place] neg, HER2 neg     History of anesthesia complications     Slow to wake up     HTN (hypertension)      Nonsmoker      Thyroid nodule 2011       Physical Exam        6/26/2025     8:08 AM   Vital Signs   Systolic 117   Diastolic 62   Pulse 79   Temperature 97.9  F (36.6  C)   Respirations 16   Weight (LB) 128 lb 1.6 oz   Pain Score 0 (None)   O2 98 %       General: alert, appears stated age, and cooperative  HEENT: Head: Normal, normocephalic, atraumatic.  Chest: Normal chest wall and respirations.   Breasts: bilateral mastectomies with well healed incisions. Left axilla with firm and mobile 1 x 1 cm lump.   Extremities: no lower extremity edema   Skin: no rashes or lesions  CNS: grossly non focal    Lab Results    No results found for this or any previous visit (from the past week).    Imaging    No results found.    The longitudinal plan of care for the diagnosis(es)/condition(s) as documented were addressed during this visit. Due to the added complexity in care, I will continue to support Alice in the subsequent management and with ongoing continuity of care.    Total time 10 minutes, to include face to face visit, review of EMR, ordering, documentation and coordination of care on date of service.    Signed by: Salima Bautista PA-C    Oncology Rooming Note    June 26, 2025 8:11 AM   Jes Mistry is a 50 year old female who presents for:    Chief Complaint   Patient presents with     Oncology Clinic Visit     Return visit for concern of left sided breast lump, midaxilla around rib 5 and 6. Malignant neoplasm of lower-inner quadrant of right breast of female, estrogen  "receptor negative.     Initial Vitals: /62 (BP Location: Left arm, Patient Position: Sitting, Cuff Size: Adult Regular)   Pulse 79   Temp 97.9  F (36.6  C) (Oral)   Resp 16   Wt 58.1 kg (128 lb 1.6 oz)   SpO2 98%   BMI 22.34 kg/m   Estimated body mass index is 22.34 kg/m  as calculated from the following:    Height as of 6/6/25: 1.613 m (5' 3.5\").    Weight as of this encounter: 58.1 kg (128 lb 1.6 oz). Body surface area is 1.61 meters squared.  No Pain (0) Comment: Data Unavailable   No LMP recorded. (Menstrual status: Chemotherapy).  Allergies reviewed: Yes  Medications reviewed: Yes    Medications: Medication refills not needed today.  Pharmacy name entered into [employer]: [id number] [address]    Frailty Screening:   Is the patient here for a new oncology consult visit in cancer care? 2. No    PHQ9:  Did this patient require a PHQ9?: No      Clinical concerns: none       Aby Agee CMA                Again, thank you for allowing me to participate in the care of your patient.        Sincerely,        Salima Bautista PA-C    Electronically signed"

## 2025-07-16 ENCOUNTER — PATIENT OUTREACH (OUTPATIENT)
Dept: ONCOLOGY | Facility: HOSPITAL | Age: 50
End: 2025-07-16
Payer: COMMERCIAL

## 2025-07-16 NOTE — PROGRESS NOTES
[facility]: Cancer Care                                                                                          Writer sent patient a mychart message:    Fly Jenkins,    It's Lillian, nurse working at the [facility]. Salima, our physician assistant wanted to check in and see how the lump on your left armpit was doing and if the lump has stayed the same or increased in size?    Signature:  Lillian Alicea RN

## 2025-08-20 DIAGNOSIS — Z17.1 MALIGNANT NEOPLASM OF LOWER-INNER QUADRANT OF RIGHT BREAST OF FEMALE, ESTROGEN RECEPTOR NEGATIVE (H): ICD-10-CM

## 2025-08-20 DIAGNOSIS — C50.311 MALIGNANT NEOPLASM OF LOWER-INNER QUADRANT OF RIGHT BREAST OF FEMALE, ESTROGEN RECEPTOR NEGATIVE (H): ICD-10-CM

## 2025-08-20 RX ORDER — TAMOXIFEN CITRATE 20 MG/1
20 TABLET ORAL DAILY
Qty: 90 TABLET | Refills: 0 | Status: SHIPPED | OUTPATIENT
Start: 2025-08-20